# Patient Record
Sex: MALE | Race: BLACK OR AFRICAN AMERICAN | Employment: FULL TIME | URBAN - METROPOLITAN AREA
[De-identification: names, ages, dates, MRNs, and addresses within clinical notes are randomized per-mention and may not be internally consistent; named-entity substitution may affect disease eponyms.]

---

## 2018-01-03 ENCOUNTER — HOSPITAL ENCOUNTER (OUTPATIENT)
Facility: HOSPITAL | Age: 68
Setting detail: OBSERVATION
Discharge: HOME | End: 2018-01-04
Attending: EMERGENCY MEDICINE | Admitting: HOSPITALIST
Payer: COMMERCIAL

## 2018-01-03 ENCOUNTER — APPOINTMENT (OUTPATIENT)
Dept: GENERAL RADIOLOGY | Facility: HOSPITAL | Age: 68
End: 2018-01-03
Attending: EMERGENCY MEDICINE
Payer: COMMERCIAL

## 2018-01-03 ENCOUNTER — APPOINTMENT (OUTPATIENT)
Dept: CT IMAGING | Facility: HOSPITAL | Age: 68
End: 2018-01-03
Attending: EMERGENCY MEDICINE
Payer: COMMERCIAL

## 2018-01-03 DIAGNOSIS — R10.9 ABDOMINAL PAIN OF UNKNOWN ETIOLOGY: ICD-10-CM

## 2018-01-03 DIAGNOSIS — D72.829 LEUKOCYTOSIS, UNSPECIFIED TYPE: ICD-10-CM

## 2018-01-03 DIAGNOSIS — R11.2 INTRACTABLE VOMITING WITH NAUSEA, UNSPECIFIED VOMITING TYPE: Primary | ICD-10-CM

## 2018-01-03 LAB
ALBUMIN SERPL-MCNC: 3.9 G/DL (ref 3.5–4.8)
ALP LIVER SERPL-CCNC: 80 U/L (ref 45–117)
ALT SERPL-CCNC: 28 U/L (ref 17–63)
AST SERPL-CCNC: 24 U/L (ref 15–41)
ATRIAL RATE: 73 BPM
BASOPHILS # BLD AUTO: 0.04 X10(3) UL (ref 0–0.1)
BASOPHILS NFR BLD AUTO: 0.2 %
BILIRUB SERPL-MCNC: 0.3 MG/DL (ref 0.1–2)
BILIRUB UR QL STRIP.AUTO: NEGATIVE
BUN BLD-MCNC: 20 MG/DL (ref 8–20)
CALCIUM BLD-MCNC: 8.9 MG/DL (ref 8.3–10.3)
CHLORIDE: 108 MMOL/L (ref 101–111)
CLARITY UR REFRACT.AUTO: CLEAR
CO2: 27 MMOL/L (ref 22–32)
COLOR UR AUTO: YELLOW
CREAT BLD-MCNC: 1.03 MG/DL (ref 0.7–1.3)
EOSINOPHIL # BLD AUTO: 0.02 X10(3) UL (ref 0–0.3)
EOSINOPHIL NFR BLD AUTO: 0.1 %
ERYTHROCYTE [DISTWIDTH] IN BLOOD BY AUTOMATED COUNT: 15.2 % (ref 11.5–16)
GLUCOSE BLD-MCNC: 208 MG/DL (ref 70–99)
GLUCOSE UR STRIP.AUTO-MCNC: 50 MG/DL
HCT VFR BLD AUTO: 39.5 % (ref 37–53)
HGB BLD-MCNC: 12.7 G/DL (ref 13–17)
IMMATURE GRANULOCYTE COUNT: 0.11 X10(3) UL (ref 0–1)
IMMATURE GRANULOCYTE RATIO %: 0.6 %
KETONES UR STRIP.AUTO-MCNC: NEGATIVE MG/DL
LEUKOCYTE ESTERASE UR QL STRIP.AUTO: NEGATIVE
LIPASE: 212 U/L (ref 73–393)
LYMPHOCYTES # BLD AUTO: 2.62 X10(3) UL (ref 0.9–4)
LYMPHOCYTES NFR BLD AUTO: 14.5 %
M PROTEIN MFR SERPL ELPH: 8.3 G/DL (ref 6.1–8.3)
MCH RBC QN AUTO: 27.4 PG (ref 27–33.2)
MCHC RBC AUTO-ENTMCNC: 32.2 G/DL (ref 31–37)
MCV RBC AUTO: 85.1 FL (ref 80–99)
MONOCYTES # BLD AUTO: 1.13 X10(3) UL (ref 0.1–0.6)
MONOCYTES NFR BLD AUTO: 6.2 %
NEUTROPHIL ABS PRELIM: 14.2 X10 (3) UL (ref 1.3–6.7)
NEUTROPHILS # BLD AUTO: 14.2 X10(3) UL (ref 1.3–6.7)
NEUTROPHILS NFR BLD AUTO: 78.4 %
NITRITE UR QL STRIP.AUTO: NEGATIVE
P AXIS: 51 DEGREES
P-R INTERVAL: 184 MS
PH UR STRIP.AUTO: 7 [PH] (ref 4.5–8)
PLATELET # BLD AUTO: 413 10(3)UL (ref 150–450)
POTASSIUM SERPL-SCNC: 4.4 MMOL/L (ref 3.6–5.1)
PROT UR STRIP.AUTO-MCNC: NEGATIVE MG/DL
Q-T INTERVAL: 404 MS
QRS DURATION: 76 MS
QTC CALCULATION (BEZET): 445 MS
R AXIS: -22 DEGREES
RBC # BLD AUTO: 4.64 X10(6)UL (ref 3.8–5.8)
RBC UR QL AUTO: NEGATIVE
RED CELL DISTRIBUTION WIDTH-SD: 46.7 FL (ref 35.1–46.3)
SODIUM SERPL-SCNC: 139 MMOL/L (ref 136–144)
SP GR UR STRIP.AUTO: 1.03 (ref 1–1.03)
T AXIS: 18 DEGREES
TROPONIN: <0.046 NG/ML (ref ?–0.05)
UROBILINOGEN UR STRIP.AUTO-MCNC: <2 MG/DL
VENTRICULAR RATE: 73 BPM
WBC # BLD AUTO: 18.1 X10(3) UL (ref 4–13)

## 2018-01-03 PROCEDURE — 74177 CT ABD & PELVIS W/CONTRAST: CPT | Performed by: EMERGENCY MEDICINE

## 2018-01-03 PROCEDURE — 99220 INITIAL OBSERVATION CARE,LEVL III: CPT | Performed by: INTERNAL MEDICINE

## 2018-01-03 PROCEDURE — 71045 X-RAY EXAM CHEST 1 VIEW: CPT | Performed by: EMERGENCY MEDICINE

## 2018-01-03 PROCEDURE — 74018 RADEX ABDOMEN 1 VIEW: CPT | Performed by: EMERGENCY MEDICINE

## 2018-01-03 RX ORDER — METRONIDAZOLE 500 MG/1
500 TABLET ORAL EVERY 8 HOURS SCHEDULED
Status: DISCONTINUED | OUTPATIENT
Start: 2018-01-03 | End: 2018-01-04

## 2018-01-03 RX ORDER — HYDROMORPHONE HYDROCHLORIDE 1 MG/ML
0.4 INJECTION, SOLUTION INTRAMUSCULAR; INTRAVENOUS; SUBCUTANEOUS EVERY 2 HOUR PRN
Status: DISCONTINUED | OUTPATIENT
Start: 2018-01-03 | End: 2018-01-04

## 2018-01-03 RX ORDER — HYDROMORPHONE HYDROCHLORIDE 1 MG/ML
0.2 INJECTION, SOLUTION INTRAMUSCULAR; INTRAVENOUS; SUBCUTANEOUS EVERY 2 HOUR PRN
Status: DISCONTINUED | OUTPATIENT
Start: 2018-01-03 | End: 2018-01-04

## 2018-01-03 RX ORDER — ONDANSETRON 2 MG/ML
4 INJECTION INTRAMUSCULAR; INTRAVENOUS EVERY 6 HOURS PRN
Status: DISCONTINUED | OUTPATIENT
Start: 2018-01-03 | End: 2018-01-04

## 2018-01-03 RX ORDER — HYDROMORPHONE HYDROCHLORIDE 1 MG/ML
0.8 INJECTION, SOLUTION INTRAMUSCULAR; INTRAVENOUS; SUBCUTANEOUS EVERY 2 HOUR PRN
Status: DISCONTINUED | OUTPATIENT
Start: 2018-01-03 | End: 2018-01-04

## 2018-01-03 RX ORDER — SODIUM CHLORIDE 9 MG/ML
INJECTION, SOLUTION INTRAVENOUS CONTINUOUS
Status: ACTIVE | OUTPATIENT
Start: 2018-01-03 | End: 2018-01-03

## 2018-01-03 RX ORDER — ENOXAPARIN SODIUM 100 MG/ML
40 INJECTION SUBCUTANEOUS DAILY
Status: DISCONTINUED | OUTPATIENT
Start: 2018-01-03 | End: 2018-01-04

## 2018-01-03 RX ORDER — ONDANSETRON 2 MG/ML
4 INJECTION INTRAMUSCULAR; INTRAVENOUS EVERY 4 HOURS PRN
Status: DISCONTINUED | OUTPATIENT
Start: 2018-01-03 | End: 2018-01-04

## 2018-01-03 RX ORDER — METOCLOPRAMIDE HYDROCHLORIDE 5 MG/ML
10 INJECTION INTRAMUSCULAR; INTRAVENOUS ONCE
Status: COMPLETED | OUTPATIENT
Start: 2018-01-03 | End: 2018-01-03

## 2018-01-03 RX ORDER — ONDANSETRON 2 MG/ML
4 INJECTION INTRAMUSCULAR; INTRAVENOUS ONCE
Status: COMPLETED | OUTPATIENT
Start: 2018-01-03 | End: 2018-01-03

## 2018-01-03 RX ORDER — SODIUM CHLORIDE 9 MG/ML
INJECTION, SOLUTION INTRAVENOUS CONTINUOUS
Status: DISCONTINUED | OUTPATIENT
Start: 2018-01-03 | End: 2018-01-04

## 2018-01-03 RX ORDER — HYDROMORPHONE HYDROCHLORIDE 1 MG/ML
0.5 INJECTION, SOLUTION INTRAMUSCULAR; INTRAVENOUS; SUBCUTANEOUS EVERY 30 MIN PRN
Status: ACTIVE | OUTPATIENT
Start: 2018-01-03 | End: 2018-01-03

## 2018-01-03 RX ORDER — DIPHENHYDRAMINE HYDROCHLORIDE 50 MG/ML
25 INJECTION INTRAMUSCULAR; INTRAVENOUS ONCE
Status: COMPLETED | OUTPATIENT
Start: 2018-01-03 | End: 2018-01-03

## 2018-01-03 RX ORDER — HYDROMORPHONE HYDROCHLORIDE 1 MG/ML
1 INJECTION, SOLUTION INTRAMUSCULAR; INTRAVENOUS; SUBCUTANEOUS EVERY 30 MIN PRN
Status: COMPLETED | OUTPATIENT
Start: 2018-01-03 | End: 2018-01-03

## 2018-01-03 NOTE — ED INITIAL ASSESSMENT (HPI)
Arrives via [facility] EMS with c/o vomiting and abdominal pain since 1900 yesterday, as well as weakness. Diaphoretic and cool to the touch.   Began vomiting upon arrival.

## 2018-01-03 NOTE — H&P
[facility] HOSPITALIST  History and Physical     Brian Whitehead Patient Status:  Observation    10/6/1950 MRN [id number]   [facility] 4NW-A Attending Jorgito Yoo MD   Hosp Day # 0 PCP None Pcp     Chief Complaint: N/V    History of Present Anicteric. Neck: No lymphadenopathy. No JVD. No carotid bruits. Respiratory: Clear to auscultation bilaterally. No wheezes. No rhonchi. Cardiovascular: S1, S2. Regular rate and rhythm. No murmurs, rubs or gallops. Equal pulses.    Chest and Back: No tend

## 2018-01-03 NOTE — ED PROVIDER NOTES
Patient Seen in: [facility] Emergency Department    History   Patient presents with:  Nausea/Vomiting/Diarrhea (gastrointestinal)    Stated Complaint: vomiting    HPI    This is a 60-year-old male who arrives by EMS with complaints of vomiting, ques regular without murmurs or rubs. ABD: The abdomen is soft nondistended nontender. There is no rebound. There is no guarding. EXT: There I cannot reproduce any specific tenderness on his abdomen is good pulses bilaterally.   There is no calf tenderne intervals I agree with the EKG report . The rate is 73. There is nonspecific ST changes. .  No acute ST elevations are noted. The patient's urinalysis, comprehensive, troponin, CBC was done. CBC was noted to be elevated 18.1 and lipase is 212.     Anastasiya Lean INDICATIONS:  vomiting  TECHNIQUE:  CT scanning was performed from the dome of the diaphragm to the pubic symphysis with non-ionic intravenous contrast material. Post contrast coronal MPR imaging was performed. Dose reduction techniques were used.  Dose in atelectasis in both lower lobes. Small hiatal hernia      CONCLUSION:  1. Mild peripancreatic inflammatory changes adjacent to the pancreatic tail, consistent with pancreatitis. Correlate with lipase levels.  2. Mild increased density of the central mesen

## 2018-01-04 VITALS
HEART RATE: 95 BPM | RESPIRATION RATE: 16 BRPM | WEIGHT: 185 LBS | TEMPERATURE: 99 F | HEIGHT: 68 IN | OXYGEN SATURATION: 94 % | DIASTOLIC BLOOD PRESSURE: 88 MMHG | SYSTOLIC BLOOD PRESSURE: 151 MMHG | BODY MASS INDEX: 28.04 KG/M2

## 2018-01-04 LAB
ALBUMIN SERPL-MCNC: 3.1 G/DL (ref 3.5–4.8)
ALP LIVER SERPL-CCNC: 62 U/L (ref 45–117)
ALT SERPL-CCNC: 20 U/L (ref 17–63)
AST SERPL-CCNC: 17 U/L (ref 15–41)
BILIRUB SERPL-MCNC: 0.6 MG/DL (ref 0.1–2)
BUN BLD-MCNC: 12 MG/DL (ref 8–20)
CALCIUM BLD-MCNC: 8.3 MG/DL (ref 8.3–10.3)
CHLORIDE: 108 MMOL/L (ref 101–111)
CO2: 27 MMOL/L (ref 22–32)
CREAT BLD-MCNC: 0.85 MG/DL (ref 0.7–1.3)
ERYTHROCYTE [DISTWIDTH] IN BLOOD BY AUTOMATED COUNT: 15.2 % (ref 11.5–16)
GLUCOSE BLD-MCNC: 139 MG/DL (ref 70–99)
HCT VFR BLD AUTO: 34.3 % (ref 37–53)
HGB BLD-MCNC: 10.9 G/DL (ref 13–17)
M PROTEIN MFR SERPL ELPH: 7.3 G/DL (ref 6.1–8.3)
MCH RBC QN AUTO: 26.8 PG (ref 27–33.2)
MCHC RBC AUTO-ENTMCNC: 31.8 G/DL (ref 31–37)
MCV RBC AUTO: 84.5 FL (ref 80–99)
PLATELET # BLD AUTO: 337 10(3)UL (ref 150–450)
POTASSIUM SERPL-SCNC: 3.8 MMOL/L (ref 3.6–5.1)
RBC # BLD AUTO: 4.06 X10(6)UL (ref 3.8–5.8)
RED CELL DISTRIBUTION WIDTH-SD: 46.9 FL (ref 35.1–46.3)
SODIUM SERPL-SCNC: 143 MMOL/L (ref 136–144)
WBC # BLD AUTO: 13.7 X10(3) UL (ref 4–13)

## 2018-01-04 PROCEDURE — 99217 OBSERVATION CARE DISCHARGE: CPT | Performed by: INTERNAL MEDICINE

## 2018-01-04 RX ORDER — POTASSIUM CHLORIDE 20 MEQ/1
40 TABLET, EXTENDED RELEASE ORAL ONCE
Status: COMPLETED | OUTPATIENT
Start: 2018-01-04 | End: 2018-01-04

## 2018-01-04 NOTE — PROGRESS NOTES
Patient states that he feels a little better today has been able to tolerate clear liquids. has been up ambulating in room with a steady gait. voiding in good amounts without any c/o.no stools this am.

## 2018-01-04 NOTE — DISCHARGE SUMMARY
[facility] HOSPITALIST  DISCHARGE SUMMARY     Phyllis Gabriel Patient Status:  Observation    10/6/1950 MRN [id number]   [place] [facility] 4NW-A Attending No att. providers found   Hosp Day # 0 PCP None Pcp     Date of Admission: 1/3/2018  Date of Disc on IVF, zofran PRN, CLD and he was able to be advanced to regular diet as his abdominal discomfort, N/V have all resolved. His records from [facility] suggested a recent C.diff colitis which is being treated as outpatient .  No diarrhea present at time of admissi

## 2018-01-04 NOTE — CM/SW NOTE
01/04/18 0900   CM/SW Screening   Referral Source    Information Source Chart review;Nursing rounds   Patient's Mental Status Alert;Oriented   Patient's [address]   Patient lives with Alone   Patient Status Prior to Admission

## 2018-01-04 NOTE — PLAN OF CARE
Pt alert and oriented x4. Pt with c/o abdominal pain and nausea. Pain and nausea medications given x2 overnight with moderate relief. Pt denies SOB. Pt with 1 small BM overnight.   Was unable to send due to consistency of BM, BM was formed and not diarr

## 2018-01-04 NOTE — PROGRESS NOTES
Patient discharged to home explained flagyl and not to miss a dose and take all medication until it was completed. also explained to follow up with doctor in a week. patient verbalized his understanding of teaching.  Patient left per wheelchair to [employer]

## 2018-01-04 NOTE — CM/SW NOTE
SW spoke w/pt who stated he did not have a ride home. Pt stated he has no money with him. Informed him SW could arrange a medcar.  Pt stated he would prefer to take a taxi and would stop at an NAS or pay the taxi when he arrives home

## 2018-01-16 ENCOUNTER — HOSPITAL ENCOUNTER (INPATIENT)
Facility: HOSPITAL | Age: 68
LOS: 4 days | Discharge: HOME | DRG: 392 | End: 2018-01-20
Attending: EMERGENCY MEDICINE | Admitting: HOSPITALIST
Payer: COMMERCIAL

## 2018-01-16 ENCOUNTER — APPOINTMENT (OUTPATIENT)
Dept: CT IMAGING | Facility: HOSPITAL | Age: 68
DRG: 392 | End: 2018-01-16
Attending: EMERGENCY MEDICINE
Payer: COMMERCIAL

## 2018-01-16 DIAGNOSIS — K57.92 ACUTE DIVERTICULITIS: Primary | ICD-10-CM

## 2018-01-16 LAB
ALBUMIN SERPL-MCNC: 3.8 G/DL (ref 3.5–4.8)
ALP LIVER SERPL-CCNC: 81 U/L (ref 45–117)
ALT SERPL-CCNC: 27 U/L (ref 17–63)
AST SERPL-CCNC: 19 U/L (ref 15–41)
BASOPHILS # BLD AUTO: 0.02 X10(3) UL (ref 0–0.1)
BASOPHILS NFR BLD AUTO: 0.1 %
BILIRUB SERPL-MCNC: 0.4 MG/DL (ref 0.1–2)
BUN BLD-MCNC: 15 MG/DL (ref 8–20)
CALCIUM BLD-MCNC: 9.3 MG/DL (ref 8.3–10.3)
CHLORIDE: 107 MMOL/L (ref 101–111)
CO2: 27 MMOL/L (ref 22–32)
CREAT BLD-MCNC: 1.09 MG/DL (ref 0.7–1.3)
EOSINOPHIL # BLD AUTO: 0.03 X10(3) UL (ref 0–0.3)
EOSINOPHIL NFR BLD AUTO: 0.2 %
ERYTHROCYTE [DISTWIDTH] IN BLOOD BY AUTOMATED COUNT: 14.8 % (ref 11.5–16)
GLUCOSE BLD-MCNC: 139 MG/DL (ref 70–99)
HCT VFR BLD AUTO: 41.4 % (ref 37–53)
HGB BLD-MCNC: 13.3 G/DL (ref 13–17)
IMMATURE GRANULOCYTE COUNT: 0.06 X10(3) UL (ref 0–1)
IMMATURE GRANULOCYTE RATIO %: 0.4 %
LIPASE: 97 U/L (ref 73–393)
LYMPHOCYTES # BLD AUTO: 1.64 X10(3) UL (ref 0.9–4)
LYMPHOCYTES NFR BLD AUTO: 10.8 %
M PROTEIN MFR SERPL ELPH: 8.3 G/DL (ref 6.1–8.3)
MCH RBC QN AUTO: 27.4 PG (ref 27–33.2)
MCHC RBC AUTO-ENTMCNC: 32.1 G/DL (ref 31–37)
MCV RBC AUTO: 85.4 FL (ref 80–99)
MONOCYTES # BLD AUTO: 0.93 X10(3) UL (ref 0.1–0.6)
MONOCYTES NFR BLD AUTO: 6.1 %
NEUTROPHIL ABS PRELIM: 12.51 X10 (3) UL (ref 1.3–6.7)
NEUTROPHILS # BLD AUTO: 12.51 X10(3) UL (ref 1.3–6.7)
NEUTROPHILS NFR BLD AUTO: 82.4 %
PLATELET # BLD AUTO: 342 10(3)UL (ref 150–450)
POTASSIUM SERPL-SCNC: 4.3 MMOL/L (ref 3.6–5.1)
RBC # BLD AUTO: 4.85 X10(6)UL (ref 3.8–5.8)
RED CELL DISTRIBUTION WIDTH-SD: 46.1 FL (ref 35.1–46.3)
SODIUM SERPL-SCNC: 139 MMOL/L (ref 136–144)
WBC # BLD AUTO: 15.2 X10(3) UL (ref 4–13)

## 2018-01-16 PROCEDURE — 99222 1ST HOSP IP/OBS MODERATE 55: CPT | Performed by: INTERNAL MEDICINE

## 2018-01-16 PROCEDURE — 74177 CT ABD & PELVIS W/CONTRAST: CPT | Performed by: EMERGENCY MEDICINE

## 2018-01-16 RX ORDER — KETOROLAC TROMETHAMINE 30 MG/ML
15 INJECTION, SOLUTION INTRAMUSCULAR; INTRAVENOUS ONCE
Status: COMPLETED | OUTPATIENT
Start: 2018-01-16 | End: 2018-01-16

## 2018-01-16 RX ORDER — ONDANSETRON 2 MG/ML
4 INJECTION INTRAMUSCULAR; INTRAVENOUS EVERY 6 HOURS PRN
Status: DISCONTINUED | OUTPATIENT
Start: 2018-01-16 | End: 2018-01-20

## 2018-01-16 RX ORDER — HYDROCODONE BITARTRATE AND ACETAMINOPHEN 5; 325 MG/1; MG/1
1 TABLET ORAL EVERY 4 HOURS PRN
Status: DISCONTINUED | OUTPATIENT
Start: 2018-01-16 | End: 2018-01-20

## 2018-01-16 RX ORDER — HYDROMORPHONE HYDROCHLORIDE 1 MG/ML
0.5 INJECTION, SOLUTION INTRAMUSCULAR; INTRAVENOUS; SUBCUTANEOUS EVERY 30 MIN PRN
Status: ACTIVE | OUTPATIENT
Start: 2018-01-16 | End: 2018-01-16

## 2018-01-16 RX ORDER — ACETAMINOPHEN 325 MG/1
650 TABLET ORAL EVERY 4 HOURS PRN
Status: DISCONTINUED | OUTPATIENT
Start: 2018-01-16 | End: 2018-01-20

## 2018-01-16 RX ORDER — ONDANSETRON 2 MG/ML
4 INJECTION INTRAMUSCULAR; INTRAVENOUS ONCE
Status: COMPLETED | OUTPATIENT
Start: 2018-01-16 | End: 2018-01-16

## 2018-01-16 RX ORDER — ENOXAPARIN SODIUM 100 MG/ML
40 INJECTION SUBCUTANEOUS DAILY
Status: DISCONTINUED | OUTPATIENT
Start: 2018-01-16 | End: 2018-01-20

## 2018-01-16 RX ORDER — MORPHINE SULFATE 2 MG/ML
4 INJECTION, SOLUTION INTRAMUSCULAR; INTRAVENOUS EVERY 2 HOUR PRN
Status: DISCONTINUED | OUTPATIENT
Start: 2018-01-16 | End: 2018-01-20

## 2018-01-16 RX ORDER — SODIUM CHLORIDE 9 MG/ML
INJECTION, SOLUTION INTRAVENOUS CONTINUOUS
Status: DISCONTINUED | OUTPATIENT
Start: 2018-01-16 | End: 2018-01-20

## 2018-01-16 RX ORDER — METOCLOPRAMIDE HYDROCHLORIDE 5 MG/ML
10 INJECTION INTRAMUSCULAR; INTRAVENOUS EVERY 8 HOURS PRN
Status: DISCONTINUED | OUTPATIENT
Start: 2018-01-16 | End: 2018-01-20

## 2018-01-16 RX ORDER — SODIUM CHLORIDE 9 MG/ML
INJECTION, SOLUTION INTRAVENOUS CONTINUOUS
Status: ACTIVE | OUTPATIENT
Start: 2018-01-16 | End: 2018-01-16

## 2018-01-16 RX ORDER — HYDROCODONE BITARTRATE AND ACETAMINOPHEN 5; 325 MG/1; MG/1
2 TABLET ORAL EVERY 4 HOURS PRN
Status: DISCONTINUED | OUTPATIENT
Start: 2018-01-16 | End: 2018-01-20

## 2018-01-16 RX ORDER — MORPHINE SULFATE 2 MG/ML
2 INJECTION, SOLUTION INTRAMUSCULAR; INTRAVENOUS EVERY 2 HOUR PRN
Status: DISCONTINUED | OUTPATIENT
Start: 2018-01-16 | End: 2018-01-20

## 2018-01-16 RX ORDER — METRONIDAZOLE 500 MG/100ML
500 INJECTION, SOLUTION INTRAVENOUS EVERY 8 HOURS
Status: DISCONTINUED | OUTPATIENT
Start: 2018-01-16 | End: 2018-01-20

## 2018-01-16 RX ORDER — CIPROFLOXACIN 2 MG/ML
400 INJECTION, SOLUTION INTRAVENOUS EVERY 12 HOURS
Status: DISCONTINUED | OUTPATIENT
Start: 2018-01-16 | End: 2018-01-20

## 2018-01-16 RX ORDER — HALOPERIDOL 5 MG/ML
5 INJECTION INTRAMUSCULAR ONCE
Status: COMPLETED | OUTPATIENT
Start: 2018-01-16 | End: 2018-01-16

## 2018-01-16 RX ORDER — DICYCLOMINE HYDROCHLORIDE 10 MG/ML
20 INJECTION INTRAMUSCULAR ONCE
Status: COMPLETED | OUTPATIENT
Start: 2018-01-16 | End: 2018-01-16

## 2018-01-16 RX ORDER — MORPHINE SULFATE 2 MG/ML
4 INJECTION, SOLUTION INTRAMUSCULAR; INTRAVENOUS ONCE
Status: COMPLETED | OUTPATIENT
Start: 2018-01-16 | End: 2018-01-16

## 2018-01-16 RX ORDER — ACETAMINOPHEN 500 MG
1000 TABLET ORAL EVERY 6 HOURS PRN
Status: DISCONTINUED | OUTPATIENT
Start: 2018-01-16 | End: 2018-01-20

## 2018-01-16 RX ORDER — HYDRALAZINE HYDROCHLORIDE 20 MG/ML
10 INJECTION INTRAMUSCULAR; INTRAVENOUS EVERY 4 HOURS PRN
Status: DISCONTINUED | OUTPATIENT
Start: 2018-01-16 | End: 2018-01-20

## 2018-01-16 RX ORDER — MORPHINE SULFATE 2 MG/ML
1 INJECTION, SOLUTION INTRAMUSCULAR; INTRAVENOUS EVERY 2 HOUR PRN
Status: DISCONTINUED | OUTPATIENT
Start: 2018-01-16 | End: 2018-01-20

## 2018-01-16 NOTE — CONSULTS
[facility]                       Gastroenterology Consultation-[facility]    Rhianna Ryan Patient Status:  Inpatient    10/6/1950 MRN [id number]   [facility] 0SW-A Attending Muna Li MD   [address] Day # 0 PCP None Pcp Intravenous Once   [COMPLETED] sodium chloride 0.9% IV bolus 1,000 mL 1,000 mL Intravenous Once   [COMPLETED] sodium chloride 0.9% IV bolus 1,000 mL 1,000 mL Intravenous Once   [COMPLETED] ketorolac tromethamine (TORADOL) 30 MG/ML injection 15 mg 15 mg Int patient has no history of IV drug use or other illicit substances. FamHx: The patient has no family history of colon cancer or other gastrointestinal malignancies;   No family history of ulcer disease, or inflammatory bowel disease  ROS:  In addition to th rebound or guarding;  No ascites is clinically apparent; no tympany to percussion  Ext: No peripheral edema or cyanosis  Skin: Warm and dry  Psychiatric: Appropriate mood and congruent affect without obvious depression or anxiety  Labs:   Lab Results  [place] Peripancreatic stranding has resolved. SPLEEN:  Stable. Not enlarged. KIDNEYS:  Stable. Normal anatomic positions. No hydronephrosis. Numerous circumscribed low attenuation foci are seen bilaterally. They are consistent with cortical cysts.   Many ar Post contrast coronal MPR imaging was performed. Dose reduction techniques were used. Dose information is   transmitted to the [facility]) NRDR ([address]) which includes the Dose Index Registry.      PATIENT adjacent to the pancreatic tail, consistent with pancreatitis. Correlate with lipase levels. 2. 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 3. Multiple uncomplicated sigmoid diverticula.   4. Moderate prost grade fever. CT is more consistent with sigmoid diverticulitis than infectious colitis. On exam, abdomen is soft, no focal tenderness, no rebound or guarding.   Will treat with IV abx for diverticulitis and cover for C diff with oral vancomycin (prophylac

## 2018-01-16 NOTE — ED PROVIDER NOTES
Patient Seen in: [facility] Emergency Department    History   Patient presents with:  Nausea/Vomiting/Diarrhea (gastrointestinal)    Stated Complaint: n/v    HPI    11year-old with a history of renal cancer status post partial right nephrectomy 15 y kg/m²         Physical Exam    General: Patient is awake, alert in no acute distress. HEENT:Sclera are not icteric. Conjunctivae within normal limits. Mucous members are moist.   Cardiovascular: Regular rate and rhythm, normal S1-S2.   Respiratory: Lung recent C. difficile colitis. He still having pain and nausea. At this point he will be admitted for gastroneurology consultation.     Spoke with Dr. Diaz Mcqueen from gastroenterology, he will order oral and IV antibiotics    Spoke with Dr. Setphane Crane who will admit

## 2018-01-16 NOTE — ED INITIAL ASSESSMENT (HPI)
Patient has had N/V since 0300 denies fever, diarrhea, abdominal pain. Pt states he was on a Z pack last week and had a similar episode was told it was from the Z pack at that time.

## 2018-01-16 NOTE — PLAN OF CARE
NURSING ADMISSION NOTE      Patient admitted via stretcher. Oriented to room. Safety precautions initiated. Bed in low position. Call light in reach.

## 2018-01-16 NOTE — CM/SW NOTE
Patient flagging out of network. TC to [facility]. New secondary plan started 1/1/18. [employer] informed me electronically that patient has not selected HMO medical group provider. Will give patient list o EMG providers if he is interested in following here.

## 2018-01-16 NOTE — H&P
[facility] HOSPITALIST  History and Physical     Vicky Contreras Patient Status:  Emergency    10/6/1950 MRN [id number]   Location [address] [facility] Attending Estill Blizzard, MD   Hosp Day # 0 PCP None Pcp     Chief Complaint: Nausea vo rate and rhythm. No murmurs, rubs or gallops. Equal pulses. Chest and Back: No tenderness or deformity. Abdomen: Soft, nontender, nondistended. Positive bowel sounds. No rebound, guarding or organomegaly. Neurologic: No focal neurological deficits.  CN

## 2018-01-17 ENCOUNTER — APPOINTMENT (OUTPATIENT)
Dept: GENERAL RADIOLOGY | Facility: HOSPITAL | Age: 68
DRG: 392 | End: 2018-01-17
Attending: STUDENT IN AN ORGANIZED HEALTH CARE EDUCATION/TRAINING PROGRAM
Payer: COMMERCIAL

## 2018-01-17 LAB
BASOPHILS # BLD AUTO: 0.03 X10(3) UL (ref 0–0.1)
BASOPHILS NFR BLD AUTO: 0.2 %
BUN BLD-MCNC: 11 MG/DL (ref 8–20)
CALCIUM BLD-MCNC: 8.5 MG/DL (ref 8.3–10.3)
CHLORIDE: 108 MMOL/L (ref 101–111)
CO2: 26 MMOL/L (ref 22–32)
CREAT BLD-MCNC: 0.94 MG/DL (ref 0.7–1.3)
EOSINOPHIL # BLD AUTO: 0.03 X10(3) UL (ref 0–0.3)
EOSINOPHIL NFR BLD AUTO: 0.2 %
ERYTHROCYTE [DISTWIDTH] IN BLOOD BY AUTOMATED COUNT: 15.1 % (ref 11.5–16)
GLUCOSE BLD-MCNC: 120 MG/DL (ref 70–99)
HAV IGM SER QL: 2 MG/DL (ref 1.7–3)
HCT VFR BLD AUTO: 37.5 % (ref 37–53)
HGB BLD-MCNC: 12.1 G/DL (ref 13–17)
IMMATURE GRANULOCYTE COUNT: 0.05 X10(3) UL (ref 0–1)
IMMATURE GRANULOCYTE RATIO %: 0.4 %
LYMPHOCYTES # BLD AUTO: 1.94 X10(3) UL (ref 0.9–4)
LYMPHOCYTES NFR BLD AUTO: 14.1 %
MCH RBC QN AUTO: 27.4 PG (ref 27–33.2)
MCHC RBC AUTO-ENTMCNC: 32.3 G/DL (ref 31–37)
MCV RBC AUTO: 85 FL (ref 80–99)
MONOCYTES # BLD AUTO: 1.26 X10(3) UL (ref 0.1–0.6)
MONOCYTES NFR BLD AUTO: 9.2 %
NEUTROPHIL ABS PRELIM: 10.43 X10 (3) UL (ref 1.3–6.7)
NEUTROPHILS # BLD AUTO: 10.43 X10(3) UL (ref 1.3–6.7)
NEUTROPHILS NFR BLD AUTO: 75.9 %
PLATELET # BLD AUTO: 329 10(3)UL (ref 150–450)
POTASSIUM SERPL-SCNC: 3.8 MMOL/L (ref 3.6–5.1)
RBC # BLD AUTO: 4.41 X10(6)UL (ref 3.8–5.8)
RED CELL DISTRIBUTION WIDTH-SD: 46.8 FL (ref 35.1–46.3)
SODIUM SERPL-SCNC: 142 MMOL/L (ref 136–144)
WBC # BLD AUTO: 13.7 X10(3) UL (ref 4–13)

## 2018-01-17 PROCEDURE — 74019 RADEX ABDOMEN 2 VIEWS: CPT | Performed by: STUDENT IN AN ORGANIZED HEALTH CARE EDUCATION/TRAINING PROGRAM

## 2018-01-17 PROCEDURE — 99232 SBSQ HOSP IP/OBS MODERATE 35: CPT | Performed by: HOSPITALIST

## 2018-01-17 NOTE — PROGRESS NOTES
[facility] HOSPITALIST  Progress Note     Hue Mendez Patient Status:  Inpatient    10/6/1950 MRN [id number]   [facility] 0SW-A Attending Dereck Alas MD   Hosp Day # 1 PCP None Pcp     Chief Complaint: abd pain    S: Patient states that llq completed on January 12 presented with abdominal pain nonbloody vomiting and acute diverticulitis  2. Flagyl and ciprofloxacin started for treatment of acute sigmoid diverticulitis  3.  Vancomycin 125 mg p.o. twice daily due to recent history of C. difficil

## 2018-01-17 NOTE — PLAN OF CARE
PT A/O, 95% ON RA, NPO, C/O OF ABDOMINAL PAIN, TAKING MORPHINE WITH TEMPORARY RELIEF, ZOFRAN FOR NAUSEA IN EVENING, VOIDING, IVF INFUSING, IV/PO ANTIBIOTICS GIVEN, LABS IN AM.  METABOLIC/FLUID AND ELECTROLYTES - ADULT    • Hemodynamic stability and optimal

## 2018-01-17 NOTE — PAYOR COMM NOTE
--------------  ADMISSION REVIEW     Payor: [facility] (NON CONTRACTED IPA)  Subscriber #:  [id number]  Authorization Number: N/A    Admit date: 1/16/18  Admit time: 1246       Admitting Physician: Karol Kurtz MD  Attending Physician:  Sarah Bose Comment: partial nephrectomy of right kidney due to                cancer          Review of Systems    Positive for stated complaint: n/v  Other systems are as noted in HPI. Constitutional and vital signs reviewed.       All other systems reviewed and RAINBOW DRAW BLUE   RAINBOW DRAW LAVENDER   RAINBOW DRAW LIGHT GREEN   RAINBOW DRAW GOLD[KF.2]   CT abdomen and pelvis[KF.1]:  Mild sigmoid diverticulitis    ED Course as of Jan 16 1238  ------------------------------------------------------------[KF.2] Kanu Arzate Patient Status:  Emergency    10/6/1950 MRN [id number]   Location [address] [facility] Attending Jenni Armstrong MD   Hosp Day # 0 PCP None Pcp     Chief Complaint:[MP.1] Nausea vomiting    History of Present Illness: Quinton Fuentes Abdomen: Soft, nontender, nondistended. Positive bowel sounds. No rebound, guarding or organomegaly. Neurologic: No focal neurological deficits. CNII-XII grossly intact. Musculoskeletal: Moves all extremities. Extremities: No edema or cyanosis.   Integu 1/16/2018 1328 Given 40 mg Subcutaneous (Left Lower Abdomen) Melani Jerome RN      vancomycin (FIRST-VANCOMYCIN 50) 50 MG/ML oral solution 125 mg     Date Action Dose Route User    1/16/2018 2208 Given 125 mg Oral Geo Weinstein RN    1/16/2018 1517 G Date Action Dose Route User    1/16/2018 2215 New Bag (none) Intravenous Matt Jones RN    1/16/2018 1329 New Bag (none) Intravenous Marlen Echeverria RN      sodium chloride 0.9% IV bolus 1,000 mL     Date Action Dose Route User    1/16/2018 0813 New BOWEL/MESENTERY:  Normal bowel caliber. There is moderate to severe colonic diverticulosis. There is new thickening and pericolonic stranding in the mid sigmoid. No free air or focal fluid collection. Normal appendix.   Haziness in the mid mesenteric Date of Service: 1/16/2018 12:51 PM  Mettu, Gaylen Blizzard, MD   Gastroenterology   Consult Orders:   1.  IP Consult to Gastroenterology Once [[id number]] ordered by Fadia Loomis MD at 01/16/18 1147      []Manual[]Template  []Copied  [facility] Diagnosis Date   • Cancer [facility])       renal      PSHx: Past Surgical History:  No date: OTHER      Comment: partial nephrectomy of right kidney due to                cancer  Medications:   [COMPLETED] ondansetron HCl (ZOFRAN) injection 4 mg 4 mg Intravenou ondansetron HCl (ZOFRAN) injection 4 mg 4 mg Intravenous Q6H PRN   Metoclopramide HCl (REGLAN) injection 10 mg 10 mg Intravenous Q8H PRN      Allergies:   Codeine                 Nausea and vomiting  SocHx:  No history of smoking;   The patient denies alcoh CV: Regular rate and rhythm, with normal S1 and S2  Resp: Clear to auscultation bilaterally without wheezes; rubs, rhonchi, or rales  Abdomen: Soft, supraumbilical tenderness with moderate palpation, non-distended with the presence of bowel sounds;  No hepa PATIENT STATED HISTORY:(As transcribed by Technologist)  Patient complaints of nausea, vomiting since 0300 early morning.     CONTRAST USED:  100cc of Omnipaque 350     FINDINGS:    LIVER:  Stable.  Uniform parenchymal attenuation.   BILIARY:  Stable. Marylu Godinez PROCEDURE:  CT ABDOMEN PELVIS IV CONTRAST, NO ORAL (ER)     COMPARISON:  [facility] , XR ABDOMEN (1 VIEW) (CPT=74018), 1/03/2018, 6:56.  [facility] , CT ABD(C/S)/PELVIS(C) (SET), 4/27/2007, 13:21.     INDICATIONS:  vomiting     TECHNIQUE:  CT scanning was performe URINARY BLADDER:  No visible focal wall thickening, lesion, or calculus.    PELVIC NODES:  No adenopathy.    PELVIC ORGANS:  Moderately enlarged prostate, increased in size. BONES:  L4-5 and L5-S1 degenerative disc disease and vacuum disc degeneration.   L

## 2018-01-17 NOTE — PROGRESS NOTES
Gastroenterology Progress Note  Yuliana Sepulveda Patient Status:  Inpatient    10/6/1950 MRN [id number]   [facility] 0SW-A Attending Mya Marrufo MD   Hosp Day # 1 PCP None Pcp     Chief Saadia liquid diet encouraging smaller more frequent meals; OK to advance as tolerated to a goal of low fiber   2. Continue IV Flagyl/Cipro; consider transitioning to oral route in AM if tolerating a diet  3. Continue Vancomycin 125 mg PO BID  4.  Continue Zofran

## 2018-01-17 NOTE — PLAN OF CARE
GASTROINTESTINAL - ADULT    • Minimal or absence of nausea and vomiting Progressing    • Maintains or returns to baseline bowel function Progressing        METABOLIC/FLUID AND ELECTROLYTES - ADULT    • Hemodynamic stability and optimal renal function maint

## 2018-01-17 NOTE — PROGRESS NOTES
Patient had sips of water and some bites of jello  Increased pain and nausea  Increased frequency and dose of pain medications.    Will continue to monitor  Patient NPO

## 2018-01-18 LAB — POTASSIUM SERPL-SCNC: 3.3 MMOL/L (ref 3.6–5.1)

## 2018-01-18 PROCEDURE — 99232 SBSQ HOSP IP/OBS MODERATE 35: CPT | Performed by: HOSPITALIST

## 2018-01-18 NOTE — PLAN OF CARE
PT A/O, 95% ON RA, INCREASED BP, GIVEN PRN HYDRALAZINE, IV/PO ANTIBIOTICS, SMALL EMESIS, GIVEN ZOFRAN/REGLAN WITH RELIEF, C/O OF ABDOMINAL PAIN OF 7--8, TAKING MORPHINE 4MG Q 2 HOURS, WITH PARTIAL RELIEF, PAIN BETTER THIS AM. SCDS ON, VOIDING IN URINAL, IV

## 2018-01-18 NOTE — PROGRESS NOTES
Gastroenterology Progress Note  Hue Mendez Patient Status:  Inpatient    10/6/1950 MRN [id number]   [facility] 0SW-A Attending Dereck Alas MD   Hosp Day # 2 PCP None Pcp     Chief Saadia FINDINGS:    BOWEL GAS PATTERN:  There is no free air. The large bowel is decompressed, there is some air in the rectum.   There are mildly distended loops of small bowel within the mid abdomen diffusely with a few air-fluid levels on the upright patient was not seen by me, only examined by KIMBERLY Breaux. The plan was discussed with KIMBERLY and her note above was reviewed. In summary, he continues to improve slowly. Advance diet, and if tolerates liquids, convert to oral abx tomorrow.     Prave

## 2018-01-18 NOTE — PROGRESS NOTES
[facility] HOSPITALIST  Progress Note     Any Shyam Patient Status:  Inpatient    10/6/1950 MRN [id number]   [facility] 0SW-A Attending Santa Davis MD   Hosp Day # 2 PCP None Pcp     Chief Complaint: abd pain    S: Patient feels better ov completed  tx on January 12 presented with abdominal pain nonbloody vomiting and acute diverticulitis  2. Flagyl and ciprofloxacin started for treatment of acute sigmoid diverticulitis  3.  Vancomycin 125 mg p.o. twice daily due to recent history of C. diff

## 2018-01-19 LAB
BASOPHILS # BLD AUTO: 0.03 X10(3) UL (ref 0–0.1)
BASOPHILS NFR BLD AUTO: 0.2 %
EOSINOPHIL # BLD AUTO: 0.03 X10(3) UL (ref 0–0.3)
EOSINOPHIL NFR BLD AUTO: 0.2 %
ERYTHROCYTE [DISTWIDTH] IN BLOOD BY AUTOMATED COUNT: 15 % (ref 11.5–16)
ERYTHROCYTE [DISTWIDTH] IN BLOOD BY AUTOMATED COUNT: 15 % (ref 11.5–16)
HCT VFR BLD AUTO: 36.4 % (ref 37–53)
HCT VFR BLD AUTO: 36.4 % (ref 37–53)
HGB BLD-MCNC: 11.7 G/DL (ref 13–17)
HGB BLD-MCNC: 11.7 G/DL (ref 13–17)
IMMATURE GRANULOCYTE COUNT: 0.05 X10(3) UL (ref 0–1)
IMMATURE GRANULOCYTE RATIO %: 0.4 %
LYMPHOCYTES # BLD AUTO: 1.89 X10(3) UL (ref 0.9–4)
LYMPHOCYTES NFR BLD AUTO: 14.1 %
MCH RBC QN AUTO: 27.3 PG (ref 27–33.2)
MCH RBC QN AUTO: 27.3 PG (ref 27–33.2)
MCHC RBC AUTO-ENTMCNC: 32.1 G/DL (ref 31–37)
MCHC RBC AUTO-ENTMCNC: 32.1 G/DL (ref 31–37)
MCV RBC AUTO: 85 FL (ref 80–99)
MCV RBC AUTO: 85 FL (ref 80–99)
MONOCYTES # BLD AUTO: 1.3 X10(3) UL (ref 0.1–0.6)
MONOCYTES NFR BLD AUTO: 9.7 %
NEUTROPHIL ABS PRELIM: 10.08 X10 (3) UL (ref 1.3–6.7)
NEUTROPHILS # BLD AUTO: 10.08 X10(3) UL (ref 1.3–6.7)
NEUTROPHILS NFR BLD AUTO: 75.4 %
PLATELET # BLD AUTO: 299 10(3)UL (ref 150–450)
PLATELET # BLD AUTO: 299 10(3)UL (ref 150–450)
POTASSIUM SERPL-SCNC: 3.6 MMOL/L (ref 3.6–5.1)
RBC # BLD AUTO: 4.28 X10(6)UL (ref 3.8–5.8)
RBC # BLD AUTO: 4.28 X10(6)UL (ref 3.8–5.8)
RED CELL DISTRIBUTION WIDTH-SD: 46.3 FL (ref 35.1–46.3)
RED CELL DISTRIBUTION WIDTH-SD: 46.3 FL (ref 35.1–46.3)
WBC # BLD AUTO: 13.4 X10(3) UL (ref 4–13)
WBC # BLD AUTO: 13.4 X10(3) UL (ref 4–13)

## 2018-01-19 PROCEDURE — 99232 SBSQ HOSP IP/OBS MODERATE 35: CPT | Performed by: HOSPITALIST

## 2018-01-19 NOTE — PROGRESS NOTES
[facility]  Report of GI Consultation    Anupama Vargas Patient Status:  Inpatient    10/6/1950 MRN [id number]   [facility] 0SW-A Attending Emilio Amaro MD   [facility] Day # 3 PCP None Pcp     Date of Admission:  2018  PCP: None Pcp mg Intravenous Q4H PRN   influenza virus vaccine (FLUAD) ages 72 years and older inj 0.5ml 0.5 mL Intramuscular Prior to discharge       Allergies    Codeine                 Nausea and vomiting      Physical Exam:   Blood pressure (!) 161/82, pulse 90, tem admission. Recommendations:  1. Encouraged full liquid diet  2. Continue Levo and Flagyl, change to oral tomorrow if tolerating full liquids  3. Continue prophylactic dose of oral vancomycin  4.  Ambulate frequently      Jensen Ramsey MD  1/19/2018

## 2018-01-19 NOTE — PROGRESS NOTES
[facility] HOSPITALIST  Progress Note     Kanu Arzate Patient Status:  Inpatient    10/6/1950 MRN [id number]   [facility] 0SW-A Attending Quynh Glez MD   Hosp Day # 3 PCP None Pcp     Chief Complaint: abd pain and diarrhea    S: Patient de ASSESSMENT / PLAN:     1. history of renal cancer and a recent diagnosis of C. difficile completed  tx on January 12 presented with abdominal pain nonbloody vomiting and acute diverticulitis-clinically better, anticipate d/c in am  2.  Flagyl and cipr

## 2018-01-20 VITALS
TEMPERATURE: 99 F | SYSTOLIC BLOOD PRESSURE: 164 MMHG | WEIGHT: 184 LBS | DIASTOLIC BLOOD PRESSURE: 88 MMHG | OXYGEN SATURATION: 98 % | RESPIRATION RATE: 18 BRPM | HEART RATE: 81 BPM | BODY MASS INDEX: 27.89 KG/M2 | HEIGHT: 68 IN

## 2018-01-20 LAB — POTASSIUM SERPL-SCNC: 3.5 MMOL/L (ref 3.6–5.1)

## 2018-01-20 PROCEDURE — 99239 HOSP IP/OBS DSCHRG MGMT >30: CPT | Performed by: HOSPITALIST

## 2018-01-20 RX ORDER — CIPROFLOXACIN 500 MG/1
500 TABLET, FILM COATED ORAL 2 TIMES DAILY
Qty: 10 TABLET | Refills: 0 | Status: SHIPPED | OUTPATIENT
Start: 2018-01-20 | End: 2018-01-25

## 2018-01-20 RX ORDER — METRONIDAZOLE 500 MG/1
500 TABLET ORAL EVERY 8 HOURS SCHEDULED
Status: DISCONTINUED | OUTPATIENT
Start: 2018-01-20 | End: 2018-01-20

## 2018-01-20 RX ORDER — LEVOFLOXACIN 750 MG/1
750 TABLET ORAL DAILY
Status: DISCONTINUED | OUTPATIENT
Start: 2018-01-21 | End: 2018-01-20

## 2018-01-20 RX ORDER — METRONIDAZOLE 500 MG/1
500 TABLET ORAL 3 TIMES DAILY
Qty: 15 TABLET | Refills: 0 | Status: SHIPPED | OUTPATIENT
Start: 2018-01-20 | End: 2018-01-25

## 2018-01-20 NOTE — PLAN OF CARE
GASTROINTESTINAL - ADULT    • Minimal or absence of nausea and vomiting Progressing        PAIN - ADULT    • Verbalizes/displays adequate comfort level or patient's stated pain goal Progressing          Assumed care of the patient @ 19:30, ambulating in ha

## 2018-01-20 NOTE — PROGRESS NOTES
[facility]  Report of GI Consultation    Fran Flaming Patient Status:  Inpatient    10/6/1950 MRN [id number]   [facility] 0SW-A Attending Taisha Kingsley MD   [facility] Day # 4 PCP None Pcp     Date of Admission:  2018  PCP: None Pcp temperature source Oral, resp. rate 18, height 68\", weight 184 lb, SpO2 98 %.     GENERAL: NAD, oriented x 3, pleasant  HEENT: Normal oral mucosa, good dentition, no ulceration  PULM: Lungs clear to auscultation anteriorly  CV: Regular, no murmurs  ABD: S/

## 2018-01-20 NOTE — PLAN OF CARE
Pt up walking in the [place], tolerated well. Tolerated a ensure this evening otherwise would only have water and lemon lime soda. Pt denies pain at this time. Pt having elevated B/P, increased metoprolol. meds per md order, Continue to monitor.

## 2018-01-20 NOTE — PLAN OF CARE
Pt states he has had nothing but ensure to drink/eat, He is fearful of thepain. Explained to him that the Dr will not send him home if he hasnt at least trialed some food. Placed order for pt to eat. WIll monitor.  Pt is not complaining of any pain at this

## 2018-01-20 NOTE — DISCHARGE SUMMARY
[facility] HOSPITALIST  DISCHARGE SUMMARY     Isai Rosales Patient Status:  Inpatient    10/6/1950 MRN [id number]   [facility] 0SW-A Attending Salvatore Diallo MD   [facility] Day # 4 PCP None Pcp     Date of Admission: 2018  Date of Discharge:  Medications      START taking these medications      Instructions Prescription details   Ciprofloxacin HCl 500 MG Tabs  Commonly known as:  CIPRO      Take 1 tablet (500 mg total) by mouth 2 (two) times daily.    Stop taking on:  1/25/2018  Quantity:  10 ta

## 2018-01-24 ENCOUNTER — HOSPITAL ENCOUNTER (EMERGENCY)
Facility: HOSPITAL | Age: 68
Discharge: HOME | End: 2018-01-24
Attending: EMERGENCY MEDICINE
Payer: COMMERCIAL

## 2018-01-24 VITALS
HEIGHT: 68 IN | TEMPERATURE: 98 F | DIASTOLIC BLOOD PRESSURE: 82 MMHG | HEART RATE: 88 BPM | BODY MASS INDEX: 28.04 KG/M2 | WEIGHT: 185 LBS | OXYGEN SATURATION: 98 % | RESPIRATION RATE: 16 BRPM | SYSTOLIC BLOOD PRESSURE: 178 MMHG

## 2018-01-24 DIAGNOSIS — A04.72 CLOSTRIDIUM DIFFICILE COLITIS: Primary | ICD-10-CM

## 2018-01-24 LAB
ALBUMIN SERPL-MCNC: 3.4 G/DL (ref 3.5–4.8)
ALP LIVER SERPL-CCNC: 74 U/L (ref 45–117)
ALT SERPL-CCNC: 34 U/L (ref 17–63)
AST SERPL-CCNC: 17 U/L (ref 15–41)
BASOPHILS # BLD AUTO: 0.04 X10(3) UL (ref 0–0.1)
BASOPHILS NFR BLD AUTO: 0.3 %
BILIRUB SERPL-MCNC: 0.1 MG/DL (ref 0.1–2)
BUN BLD-MCNC: 14 MG/DL (ref 8–20)
CALCIUM BLD-MCNC: 8.1 MG/DL (ref 8.3–10.3)
CHLORIDE: 111 MMOL/L (ref 101–111)
CO2: 25 MMOL/L (ref 22–32)
CREAT BLD-MCNC: 0.89 MG/DL (ref 0.7–1.3)
EOSINOPHIL # BLD AUTO: 0.09 X10(3) UL (ref 0–0.3)
EOSINOPHIL NFR BLD AUTO: 0.8 %
ERYTHROCYTE [DISTWIDTH] IN BLOOD BY AUTOMATED COUNT: 14.6 % (ref 11.5–16)
GLUCOSE BLD-MCNC: 95 MG/DL (ref 70–99)
HCT VFR BLD AUTO: 36.4 % (ref 37–53)
HGB BLD-MCNC: 11.8 G/DL (ref 13–17)
IMMATURE GRANULOCYTE COUNT: 0.09 X10(3) UL (ref 0–1)
IMMATURE GRANULOCYTE RATIO %: 0.8 %
LYMPHOCYTES # BLD AUTO: 2.64 X10(3) UL (ref 0.9–4)
LYMPHOCYTES NFR BLD AUTO: 22.6 %
M PROTEIN MFR SERPL ELPH: 7.6 G/DL (ref 6.1–8.3)
MCH RBC QN AUTO: 27.6 PG (ref 27–33.2)
MCHC RBC AUTO-ENTMCNC: 32.4 G/DL (ref 31–37)
MCV RBC AUTO: 85.2 FL (ref 80–99)
MONOCYTES # BLD AUTO: 1.04 X10(3) UL (ref 0.1–0.6)
MONOCYTES NFR BLD AUTO: 8.9 %
NEUTROPHIL ABS PRELIM: 7.77 X10 (3) UL (ref 1.3–6.7)
NEUTROPHILS # BLD AUTO: 7.77 X10(3) UL (ref 1.3–6.7)
NEUTROPHILS NFR BLD AUTO: 66.6 %
PLATELET # BLD AUTO: 355 10(3)UL (ref 150–450)
POTASSIUM SERPL-SCNC: 3.7 MMOL/L (ref 3.6–5.1)
RBC # BLD AUTO: 4.27 X10(6)UL (ref 3.8–5.8)
RED CELL DISTRIBUTION WIDTH-SD: 45.1 FL (ref 35.1–46.3)
SODIUM SERPL-SCNC: 143 MMOL/L (ref 136–144)
WBC # BLD AUTO: 11.7 X10(3) UL (ref 4–13)

## 2018-01-24 PROCEDURE — 99284 EMERGENCY DEPT VISIT MOD MDM: CPT

## 2018-01-24 PROCEDURE — 80053 COMPREHEN METABOLIC PANEL: CPT | Performed by: EMERGENCY MEDICINE

## 2018-01-24 PROCEDURE — 85025 COMPLETE CBC W/AUTO DIFF WBC: CPT | Performed by: EMERGENCY MEDICINE

## 2018-01-24 PROCEDURE — 96360 HYDRATION IV INFUSION INIT: CPT

## 2018-01-25 NOTE — ED PROVIDER NOTES
Patient Seen in: [facility] Emergency Department    History   Patient presents with:  Nausea/Vomiting/Diarrhea (gastrointestinal)    Stated Complaint: diarrhea    HPI    Patient is a pleasant 61-year-old male presents the emergency room with recurre bilaterally  Cardiac: No tachycardia. No murmurs. Regular rate and rhythm. Abdomen: Soft and nontender throughout. No rebound or guarding  Extremities: No clubbing/cyanosis/edema. Skin: No rashes, no pallor  Neuro: Awake oriented ×3.   Nonfocal.  Good [address]  [phone]    In 1 week          Medications Prescribed:  Discharge Medication List as of 1/24/2018  5:03 PM

## 2018-03-01 ENCOUNTER — HOSPITAL ENCOUNTER (INPATIENT)
Facility: HOSPITAL | Age: 68
LOS: 2 days | Discharge: HOME | DRG: 392 | End: 2018-03-04
Attending: EMERGENCY MEDICINE | Admitting: INTERNAL MEDICINE
Payer: COMMERCIAL

## 2018-03-01 ENCOUNTER — APPOINTMENT (OUTPATIENT)
Dept: CT IMAGING | Facility: HOSPITAL | Age: 68
DRG: 392 | End: 2018-03-01
Attending: EMERGENCY MEDICINE
Payer: COMMERCIAL

## 2018-03-01 DIAGNOSIS — R11.2 INTRACTABLE VOMITING WITH NAUSEA, UNSPECIFIED VOMITING TYPE: ICD-10-CM

## 2018-03-01 DIAGNOSIS — R10.9 ABDOMINAL PAIN, ACUTE: Primary | ICD-10-CM

## 2018-03-01 DIAGNOSIS — D72.829 LEUKOCYTOSIS, UNSPECIFIED TYPE: ICD-10-CM

## 2018-03-01 DIAGNOSIS — R10.9 ABDOMINAL PAIN: ICD-10-CM

## 2018-03-01 PROBLEM — I10 HTN (HYPERTENSION): Status: ACTIVE | Noted: 2018-03-01

## 2018-03-01 PROBLEM — R73.9 HYPERGLYCEMIA: Status: ACTIVE | Noted: 2018-03-01

## 2018-03-01 LAB
ALBUMIN SERPL-MCNC: 4.1 G/DL (ref 3.5–4.8)
ALP LIVER SERPL-CCNC: 88 U/L (ref 45–117)
ALT SERPL-CCNC: 24 U/L (ref 17–63)
APTT PPP: 23.1 SECONDS (ref 25–34)
AST SERPL-CCNC: 19 U/L (ref 15–41)
BASOPHILS # BLD AUTO: 0.05 X10(3) UL (ref 0–0.1)
BASOPHILS NFR BLD AUTO: 0.3 %
BILIRUB SERPL-MCNC: 0.4 MG/DL (ref 0.1–2)
BILIRUB UR QL STRIP.AUTO: NEGATIVE
BUN BLD-MCNC: 13 MG/DL (ref 8–20)
CALCIUM BLD-MCNC: 9.7 MG/DL (ref 8.3–10.3)
CHLORIDE: 105 MMOL/L (ref 101–111)
CLARITY UR REFRACT.AUTO: CLEAR
CO2: 26 MMOL/L (ref 22–32)
CREAT BLD-MCNC: 1.12 MG/DL (ref 0.7–1.3)
EOSINOPHIL # BLD AUTO: 0.02 X10(3) UL (ref 0–0.3)
EOSINOPHIL NFR BLD AUTO: 0.1 %
ERYTHROCYTE [DISTWIDTH] IN BLOOD BY AUTOMATED COUNT: 14.8 % (ref 11.5–16)
GLUCOSE BLD-MCNC: 142 MG/DL (ref 70–99)
GLUCOSE UR STRIP.AUTO-MCNC: 150 MG/DL
HCT VFR BLD AUTO: 42.9 % (ref 37–53)
HGB BLD-MCNC: 13.7 G/DL (ref 13–17)
IMMATURE GRANULOCYTE COUNT: 0.06 X10(3) UL (ref 0–1)
IMMATURE GRANULOCYTE RATIO %: 0.4 %
INR BLD: 1.04 (ref 0.89–1.11)
KETONES UR STRIP.AUTO-MCNC: 20 MG/DL
LEUKOCYTE ESTERASE UR QL STRIP.AUTO: NEGATIVE
LIPASE: 93 U/L (ref 73–393)
LYMPHOCYTES # BLD AUTO: 2.54 X10(3) UL (ref 0.9–4)
LYMPHOCYTES NFR BLD AUTO: 15.9 %
M PROTEIN MFR SERPL ELPH: 8.8 G/DL (ref 6.1–8.3)
MCH RBC QN AUTO: 27.2 PG (ref 27–33.2)
MCHC RBC AUTO-ENTMCNC: 31.9 G/DL (ref 31–37)
MCV RBC AUTO: 85.3 FL (ref 80–99)
MONOCYTES # BLD AUTO: 1.03 X10(3) UL (ref 0.1–1)
MONOCYTES NFR BLD AUTO: 6.4 %
NEUTROPHIL ABS PRELIM: 12.3 X10 (3) UL (ref 1.3–6.7)
NEUTROPHILS # BLD AUTO: 12.3 X10(3) UL (ref 1.3–6.7)
NEUTROPHILS NFR BLD AUTO: 76.9 %
NITRITE UR QL STRIP.AUTO: NEGATIVE
PH UR STRIP.AUTO: 8 [PH] (ref 4.5–8)
PLATELET # BLD AUTO: 270 10(3)UL (ref 150–450)
POTASSIUM SERPL-SCNC: 3.7 MMOL/L (ref 3.6–5.1)
PROT UR STRIP.AUTO-MCNC: NEGATIVE MG/DL
PSA SERPL DL<=0.01 NG/ML-MCNC: 13.6 SECONDS (ref 12–14.3)
RBC # BLD AUTO: 5.03 X10(6)UL (ref 3.8–5.8)
RBC UR QL AUTO: NEGATIVE
RED CELL DISTRIBUTION WIDTH-SD: 46.1 FL (ref 35.1–46.3)
SODIUM SERPL-SCNC: 140 MMOL/L (ref 136–144)
SP GR UR STRIP.AUTO: 1.04 (ref 1–1.03)
UROBILINOGEN UR STRIP.AUTO-MCNC: <2 MG/DL
WBC # BLD AUTO: 16 X10(3) UL (ref 4–13)

## 2018-03-01 PROCEDURE — 74177 CT ABD & PELVIS W/CONTRAST: CPT | Performed by: EMERGENCY MEDICINE

## 2018-03-01 PROCEDURE — 99223 1ST HOSP IP/OBS HIGH 75: CPT | Performed by: INTERNAL MEDICINE

## 2018-03-01 RX ORDER — MORPHINE SULFATE 4 MG/ML
1 INJECTION, SOLUTION INTRAMUSCULAR; INTRAVENOUS EVERY 2 HOUR PRN
Status: DISCONTINUED | OUTPATIENT
Start: 2018-03-01 | End: 2018-03-04

## 2018-03-01 RX ORDER — SODIUM CHLORIDE 9 MG/ML
INJECTION, SOLUTION INTRAVENOUS CONTINUOUS
Status: ACTIVE | OUTPATIENT
Start: 2018-03-02 | End: 2018-03-02

## 2018-03-01 RX ORDER — SODIUM CHLORIDE 9 MG/ML
1000 INJECTION, SOLUTION INTRAVENOUS ONCE
Status: COMPLETED | OUTPATIENT
Start: 2018-03-01 | End: 2018-03-02

## 2018-03-01 RX ORDER — ENOXAPARIN SODIUM 100 MG/ML
40 INJECTION SUBCUTANEOUS DAILY
Status: DISCONTINUED | OUTPATIENT
Start: 2018-03-02 | End: 2018-03-04

## 2018-03-01 RX ORDER — ONDANSETRON 2 MG/ML
4 INJECTION INTRAMUSCULAR; INTRAVENOUS ONCE
Status: COMPLETED | OUTPATIENT
Start: 2018-03-01 | End: 2018-03-01

## 2018-03-01 RX ORDER — KETOROLAC TROMETHAMINE 30 MG/ML
30 INJECTION, SOLUTION INTRAMUSCULAR; INTRAVENOUS ONCE
Status: COMPLETED | OUTPATIENT
Start: 2018-03-01 | End: 2018-03-01

## 2018-03-01 RX ORDER — ONDANSETRON 2 MG/ML
4 INJECTION INTRAMUSCULAR; INTRAVENOUS ONCE
Status: DISCONTINUED | OUTPATIENT
Start: 2018-03-01 | End: 2018-03-04

## 2018-03-01 RX ORDER — ONDANSETRON 2 MG/ML
4 INJECTION INTRAMUSCULAR; INTRAVENOUS EVERY 4 HOURS PRN
Status: COMPLETED | OUTPATIENT
Start: 2018-03-01 | End: 2018-03-02

## 2018-03-01 RX ORDER — MORPHINE SULFATE 4 MG/ML
2 INJECTION, SOLUTION INTRAMUSCULAR; INTRAVENOUS EVERY 2 HOUR PRN
Status: DISCONTINUED | OUTPATIENT
Start: 2018-03-01 | End: 2018-03-04

## 2018-03-01 RX ORDER — MORPHINE SULFATE 4 MG/ML
4 INJECTION, SOLUTION INTRAMUSCULAR; INTRAVENOUS EVERY 2 HOUR PRN
Status: DISCONTINUED | OUTPATIENT
Start: 2018-03-01 | End: 2018-03-04

## 2018-03-01 RX ORDER — SODIUM CHLORIDE 9 MG/ML
1000 INJECTION, SOLUTION INTRAVENOUS ONCE
Status: COMPLETED | OUTPATIENT
Start: 2018-03-01 | End: 2018-03-01

## 2018-03-01 RX ORDER — SODIUM CHLORIDE 9 MG/ML
INJECTION, SOLUTION INTRAVENOUS CONTINUOUS
Status: DISCONTINUED | OUTPATIENT
Start: 2018-03-02 | End: 2018-03-04

## 2018-03-01 RX ORDER — MORPHINE SULFATE 2 MG/ML
2 INJECTION, SOLUTION INTRAMUSCULAR; INTRAVENOUS ONCE
Status: COMPLETED | OUTPATIENT
Start: 2018-03-01 | End: 2018-03-01

## 2018-03-01 RX ORDER — ONDANSETRON 2 MG/ML
4 INJECTION INTRAMUSCULAR; INTRAVENOUS EVERY 6 HOURS PRN
Status: DISCONTINUED | OUTPATIENT
Start: 2018-03-01 | End: 2018-03-04

## 2018-03-02 LAB
ERYTHROCYTE [DISTWIDTH] IN BLOOD BY AUTOMATED COUNT: 14.8 % (ref 11.5–16)
HCT VFR BLD AUTO: 40.2 % (ref 37–53)
HGB BLD-MCNC: 12.9 G/DL (ref 13–17)
MCH RBC QN AUTO: 26.8 PG (ref 27–33.2)
MCHC RBC AUTO-ENTMCNC: 32.1 G/DL (ref 31–37)
MCV RBC AUTO: 83.6 FL (ref 80–99)
PLATELET # BLD AUTO: 289 10(3)UL (ref 150–450)
RBC # BLD AUTO: 4.81 X10(6)UL (ref 3.8–5.8)
RED CELL DISTRIBUTION WIDTH-SD: 45.6 FL (ref 35.1–46.3)
WBC # BLD AUTO: 14.1 X10(3) UL (ref 4–13)

## 2018-03-02 PROCEDURE — 99254 IP/OBS CNSLTJ NEW/EST MOD 60: CPT | Performed by: SURGERY

## 2018-03-02 PROCEDURE — 99232 SBSQ HOSP IP/OBS MODERATE 35: CPT | Performed by: INTERNAL MEDICINE

## 2018-03-02 RX ORDER — METOCLOPRAMIDE HYDROCHLORIDE 5 MG/ML
10 INJECTION INTRAMUSCULAR; INTRAVENOUS EVERY 6 HOURS PRN
Status: DISCONTINUED | OUTPATIENT
Start: 2018-03-02 | End: 2018-03-04

## 2018-03-02 RX ORDER — HYDRALAZINE HYDROCHLORIDE 20 MG/ML
10 INJECTION INTRAMUSCULAR; INTRAVENOUS EVERY 4 HOURS PRN
Status: DISCONTINUED | OUTPATIENT
Start: 2018-03-02 | End: 2018-03-04

## 2018-03-02 RX ORDER — ACETAMINOPHEN 120 MG/1
120 SUPPOSITORY RECTAL EVERY 6 HOURS PRN
Status: DISCONTINUED | OUTPATIENT
Start: 2018-03-02 | End: 2018-03-03

## 2018-03-02 RX ORDER — POTASSIUM CHLORIDE 14.9 MG/ML
20 INJECTION INTRAVENOUS ONCE
Status: COMPLETED | OUTPATIENT
Start: 2018-03-02 | End: 2018-03-02

## 2018-03-02 RX ORDER — METOCLOPRAMIDE 10 MG/1
10 TABLET ORAL EVERY 6 HOURS PRN
Status: DISCONTINUED | OUTPATIENT
Start: 2018-03-02 | End: 2018-03-02

## 2018-03-02 RX ORDER — METOCLOPRAMIDE 10 MG/1
10 TABLET ORAL EVERY 6 HOURS PRN
Status: DISCONTINUED | OUTPATIENT
Start: 2018-03-02 | End: 2018-03-04

## 2018-03-02 RX ORDER — TRAMADOL HYDROCHLORIDE 50 MG/1
50 TABLET ORAL EVERY 6 HOURS PRN
Status: DISCONTINUED | OUTPATIENT
Start: 2018-03-02 | End: 2018-03-04

## 2018-03-02 NOTE — ED PROVIDER NOTES
Patient Seen in: [facility] Emergency Department    History   Patient presents with:  Fever (infectious)  Nausea/Vomiting/Diarrhea (gastrointestinal)    Stated Complaint: flu like symptoms    HPI    Patient is 72-year-old male presents emergency rusty breathing easily in no apparent distress. Patient is nontoxic in appearance. HEENT: Head is normocephalic, atraumatic. Pupils are 3 mm equally round and reactive to light. Oropharynx is clear.  Mucous membranes are moist.  NECK: There is no focal tenderne Normal   CBC WITH DIFFERENTIAL WITH PLATELET    Narrative: The following orders were created for panel order CBC WITH DIFFERENTIAL WITH PLATELET.   Procedure                               Abnormality         Status                     --------- 10:34 PM           Disposition and Plan     Clinical Impression:  Abdominal pain, acute  (primary encounter diagnosis)  Intractable vomiting with nausea, unspecified vomiting type  Leukocytosis, unspecified type    Disposition:  Admit  3/1/2018 10:34 pm

## 2018-03-02 NOTE — CM/SW NOTE
03/02/18 1000   CM/SW Screening   Referral Source Social Work (self-referral)   [address] staff; Chart review;Nursing rounds   Patient's Mental Status Alert;Oriented       MSW, CM and bedside RN spoke about post d/c needs.  No identified need

## 2018-03-02 NOTE — CONSULTS
[facility]                       Gastroenterology Consultation-[facility] Gastroenterology    Sho Engle Patient Status:  Observation    10/6/1950 MRN [id number]   [facility] 3NW-A Attending Wendy Rincon MD   Hosp Day # 0 PCP No (APRESOLINE) injection 10 mg 10 mg Intravenous Q4H PRN   potassium chloride IVPB premix 20 mEq 20 mEq Intravenous Once   [COMPLETED] ketorolac tromethamine (TORADOL) 30 MG/ML injection 30 mg 30 mg Intravenous Once   [COMPLETED] ondansetron HCl (ZOFRAN) inj above:            Infectious Disease:  + reports C Diff 12/2017 [facility]             Cardiovascular: + HTN            Respiratory: No shortness of breath, asthma, copd, recurrent pneumonia            Hematologic: The patient reports no easy bruising, Results  Component Value Date   WBC 14.1 03/02/2018   HGB 12.9 03/02/2018   HCT 40.2 03/02/2018   .0 03/02/2018   CREATSERUM 1.12 03/01/2018   BUN 13 03/01/2018    03/01/2018   K 3.7 03/01/2018    03/01/2018   CO2 26.0 03/01/2018   GLU 1 atrophy, abnormal density, or significant focal lesion. BILIARY:  There is a calcified gallstone that appears adherent to the anterior wall of the gallbladder, unchanged from prior imaging. No biliary ductal dilatation.   PANCREAS:  No lesion, fluid col vomiting     TECHNIQUE:  CT scanning was performed from the dome of the diaphragm to the pubic symphysis with non-ionic intravenous contrast material. Post contrast coronal MPR imaging was performed. Dose reduction techniques were used.  Dose information i degeneration. LUNG BASES:  Dependent atelectasis in both lower lobes. Small hiatal hernia  =====  CONCLUSION:    1. Mild peripancreatic inflammatory changes adjacent to the pancreatic tail, consistent with pancreatitis. Correlate with lipase levels.   2. soft yet tender to palpation waqas-umbilically. He appears somewhat uncomfortable. Labs and imaging were reviewed.  He has cholelithiasis and I am concerned for biliary dyskinesia vs. Acute cholecystitis as a cause for his symptoms given his leukocytosis and

## 2018-03-02 NOTE — PROGRESS NOTES
NURSING ADMISSION NOTE      Patient admitted via Cart  Oriented to room. Safety precautions initiated. Bed in low position. Call light in reach. A/ ox 4. Patient resting in bed. Complains of mid abdominal shooting pain that is constant.   Zaire Albert

## 2018-03-02 NOTE — ED INITIAL ASSESSMENT (HPI)
Patient sts vomiting x6 today. Patient c/o flu like symptoms started around 9/10 this am. Denies fevers, + chills. EMS administered 4mg Zofran ODT.

## 2018-03-02 NOTE — H&P
[facility] HOSPITALIST  History and Physical     [facility] Patient Status:  Observation    10/6/1950 MRN [id number]   [facility] 3NW-A Attending Eloisa Funez MD   Hosp Day # 0 PCP None Pcp     Chief Complaint: abd pain/ Nausea    Histo PERRLA. Anicteric. Neck: No lymphadenopathy. No JVD. No carotid bruits. Respiratory: Clear to auscultation bilaterally. No wheezes. No rhonchi. Cardiovascular: S1, S2. Regular rate and rhythm. No murmurs, rubs or gallops. Equal pulses.    Chest and Back: staff    Radhika Hawkins MD

## 2018-03-02 NOTE — CONSULTS
[facility]  Report of Consultation    Fito Parker Patient Status:  Observation    10/6/1950 MRN [id number]   [facility] 3NW-A Attending Brian Silva MD   Hosp Day # 0 PCP None Pcp     Reason for Consultation:  Nausea, vomiting, following fatty food ingestion. I acted as a scribe in this encounter. The physician obtained a history, independently performed a physical exam, and developed an assessment and plan. The physician performed all medical decision making.     Barby Almonte irritability and lethargy, positive for chills and generalized weakness  Endocrine:  Negative for abnormal sleep patterns, increased activity, polydipsia and polyphagia  ENMT:  Negative for ear drainage, hearing loss and nasal drainage  Eyes:  Negative for Normal texture and turgor.       Laboratory Data:  Recent Labs   Lab  03/01/18   1936  03/02/18   0546   RBC  5.03  4.81   HGB  13.7  12.9*   HCT  42.9  40.2   MCV  85.3  83.6   MCH  27.2  26.8*   MCHC  31.9  32.1   RDW  14.8  14.8   NEPRELIM  12.30*   -- SPLEEN:  No enlargement or focal lesion. KIDNEYS:  Stable multiple nonenhancing bilateral renal cysts. ADRENALS:  No mass or enlargement. AORTA/VASCULAR:  No aneurysm or dissection. RETROPERITONEUM:  No mass or adenopathy.     BOWEL/MESENTERY: previous fatty food intolerance. He does not currently have right upper quadrant pain or tenderness on examination. The patient's main complaint continues to be nausea and vomiting. He has no sick contacts.   He does have a history of diverticulitis bu to reflect my evaluation, opinion, and physical exam findings. I, Dr. Farshad Lam, personally performed the services described in this documentation, as scribed by Ms Li Watson PA-C, in my presence, and it is both accurate and complete.

## 2018-03-02 NOTE — PLAN OF CARE
GASTROINTESTINAL - ADULT    • Minimal or absence of nausea and vomiting Progressing    • Maintains or returns to baseline bowel function Progressing        Upon assessment pt is laying in bed. Appears uncomfortable.  Reports epigastric abdominal pain, morph

## 2018-03-02 NOTE — PROGRESS NOTES
[facility]PITALIST  Progress Note     Comfort Hodge Patient Status:  Observation    10/6/1950 MRN [id number]   [facility] 3NW-A Attending Lalo Hansen MD   Hosp Day # 0 PCP None Pcp     Chief Complaint: abd pain, N/V    S: Patient wit 1. Unclear etiology -> early ileus, gastritis, possible recurrent C.diff   2. CT A/P without evidence of pancreatitis/diverticulitis/colitis   3. If any diarrhea will need to check for C.diff  4. GI to eval  5. Lipase normal   6. Pain control   7.  CLD an

## 2018-03-03 ENCOUNTER — APPOINTMENT (OUTPATIENT)
Dept: NUCLEAR MEDICINE | Facility: HOSPITAL | Age: 68
DRG: 392 | End: 2018-03-03
Attending: NURSE PRACTITIONER
Payer: COMMERCIAL

## 2018-03-03 LAB
ALBUMIN SERPL-MCNC: 3.9 G/DL (ref 3.5–4.8)
ALP LIVER SERPL-CCNC: 75 U/L (ref 45–117)
ALT SERPL-CCNC: 21 U/L (ref 17–63)
AST SERPL-CCNC: 17 U/L (ref 15–41)
BASOPHILS # BLD AUTO: 0.04 X10(3) UL (ref 0–0.1)
BASOPHILS NFR BLD AUTO: 0.3 %
BILIRUB SERPL-MCNC: 0.7 MG/DL (ref 0.1–2)
BUN BLD-MCNC: 14 MG/DL (ref 8–20)
CALCIUM BLD-MCNC: 8.8 MG/DL (ref 8.3–10.3)
CHLORIDE: 107 MMOL/L (ref 101–111)
CO2: 21 MMOL/L (ref 22–32)
CREAT BLD-MCNC: 1.05 MG/DL (ref 0.7–1.3)
EOSINOPHIL # BLD AUTO: 0.03 X10(3) UL (ref 0–0.3)
EOSINOPHIL NFR BLD AUTO: 0.2 %
ERYTHROCYTE [DISTWIDTH] IN BLOOD BY AUTOMATED COUNT: 15.4 % (ref 11.5–16)
GLUCOSE BLD-MCNC: 118 MG/DL (ref 70–99)
HCT VFR BLD AUTO: 42.6 % (ref 37–53)
HGB BLD-MCNC: 13.6 G/DL (ref 13–17)
IMMATURE GRANULOCYTE COUNT: 0.06 X10(3) UL (ref 0–1)
IMMATURE GRANULOCYTE RATIO %: 0.4 %
LACTIC ACID: 1.6 MMOL/L (ref 0.5–2)
LIPASE: 77 U/L (ref 73–393)
LYMPHOCYTES # BLD AUTO: 3.26 X10(3) UL (ref 0.9–4)
LYMPHOCYTES NFR BLD AUTO: 23.2 %
M PROTEIN MFR SERPL ELPH: 8.1 G/DL (ref 6.1–8.3)
MCH RBC QN AUTO: 27.1 PG (ref 27–33.2)
MCHC RBC AUTO-ENTMCNC: 31.9 G/DL (ref 31–37)
MCV RBC AUTO: 85 FL (ref 80–99)
MONOCYTES # BLD AUTO: 1.37 X10(3) UL (ref 0.1–1)
MONOCYTES NFR BLD AUTO: 9.7 %
NEUTROPHIL ABS PRELIM: 9.31 X10 (3) UL (ref 1.3–6.7)
NEUTROPHILS # BLD AUTO: 9.31 X10(3) UL (ref 1.3–6.7)
NEUTROPHILS NFR BLD AUTO: 66.2 %
PLATELET # BLD AUTO: 307 10(3)UL (ref 150–450)
POTASSIUM SERPL-SCNC: 3.7 MMOL/L (ref 3.6–5.1)
POTASSIUM SERPL-SCNC: 3.7 MMOL/L (ref 3.6–5.1)
PROCALCITONIN SERPL-MCNC: <0.11 NG/ML (ref ?–0.11)
RBC # BLD AUTO: 5.01 X10(6)UL (ref 3.8–5.8)
RED CELL DISTRIBUTION WIDTH-SD: 46.6 FL (ref 35.1–46.3)
SODIUM SERPL-SCNC: 139 MMOL/L (ref 136–144)
WBC # BLD AUTO: 14.1 X10(3) UL (ref 4–13)

## 2018-03-03 PROCEDURE — 78227 HEPATOBIL SYST IMAGE W/DRUG: CPT | Performed by: NURSE PRACTITIONER

## 2018-03-03 PROCEDURE — 99232 SBSQ HOSP IP/OBS MODERATE 35: CPT | Performed by: INTERNAL MEDICINE

## 2018-03-03 PROCEDURE — 99232 SBSQ HOSP IP/OBS MODERATE 35: CPT | Performed by: SURGERY

## 2018-03-03 RX ORDER — LABETALOL HYDROCHLORIDE 5 MG/ML
10 INJECTION, SOLUTION INTRAVENOUS EVERY 4 HOURS PRN
Status: ACTIVE | OUTPATIENT
Start: 2018-03-03 | End: 2018-03-03

## 2018-03-03 RX ORDER — KETOROLAC TROMETHAMINE 15 MG/ML
15 INJECTION, SOLUTION INTRAMUSCULAR; INTRAVENOUS EVERY 6 HOURS PRN
Status: DISCONTINUED | OUTPATIENT
Start: 2018-03-03 | End: 2018-03-04

## 2018-03-03 RX ORDER — POTASSIUM CHLORIDE 20 MEQ/1
40 TABLET, EXTENDED RELEASE ORAL ONCE
Status: COMPLETED | OUTPATIENT
Start: 2018-03-03 | End: 2018-03-03

## 2018-03-03 RX ORDER — LABETALOL HYDROCHLORIDE 5 MG/ML
INJECTION, SOLUTION INTRAVENOUS
Status: DISPENSED
Start: 2018-03-03 | End: 2018-03-03

## 2018-03-03 RX ORDER — ACETAMINOPHEN 325 MG/1
650 TABLET ORAL EVERY 6 HOURS PRN
Status: DISCONTINUED | OUTPATIENT
Start: 2018-03-03 | End: 2018-03-04

## 2018-03-03 RX ORDER — LABETALOL HYDROCHLORIDE 5 MG/ML
10 INJECTION, SOLUTION INTRAVENOUS ONCE
Status: DISCONTINUED | OUTPATIENT
Start: 2018-03-03 | End: 2018-03-03

## 2018-03-03 NOTE — PROGRESS NOTES
Pt's iv leaking this am.  Iv restarted with difficulty. #24g placed to right wrist.   Pt c/o nausea & having dry heaves. protonix given as ordered. C/o pain. toradol given as ordered. B/p elevated @ 190/88. Hydralazine given as ordered.    Pt c/o chil

## 2018-03-03 NOTE — PROGRESS NOTES
Pt c/o chills & having rigors. Dr Judy Clemente @ bedside. New orders noted for labatolol, tyl po now & stat labs. Lab @ bedside drawing blood.

## 2018-03-03 NOTE — PROGRESS NOTES
Gastroenterology Progress Note  S: Developed chills, rigors this morning, and increased abdominal pain. Localized it to epigastrium, right abdomen. Slightly better after pain meds 6/10. Awaiting HIDA scan.  Stat labs obtained  O: BP (!) 170/75 (BP Location:

## 2018-03-03 NOTE — PROGRESS NOTES
[facility]  Progress Note    Steve Blandon Patient Status:  Inpatient    10/6/1950 MRN [id number]   [facility] 3NW-A Attending Lesly Ford MD   Hosp Day # 1 PCP None Pcp     Subjective:  Pt continued to have rigors overnight, T carcinoma, right (HCC)     Hyperglycemia     Abdominal pain, acute     HTN (hypertension)    74yo M with elevated WBC and rigors despite no documented fevers, concern for cholecystitis due to gallstones seen on CT scan, pts LFTs within normal limits, lipas

## 2018-03-03 NOTE — PLAN OF CARE
GASTROINTESTINAL - ADULT    • Minimal or absence of nausea and vomiting Progressing    • Maintains or returns to baseline bowel function Progressing        A&0x3. Sats >92 on ra. Denies sob and cp. NPO.  C/o nausea with adequate relief from zofran and devi

## 2018-03-03 NOTE — PROGRESS NOTES
Spoke with dr Luisito Lovell. hida scan results given to md & poc discussed. md states ok to give cld but npo after midnight for egd.   New orders also noted for cbc & cmp in am.

## 2018-03-03 NOTE — PROGRESS NOTES
[facility]PITALIST  Progress Note     Hue Mendez Patient Status:  Observation    10/6/1950 MRN [id number]   [facility] 3NW-A Attending Clay Tavera MD   [facility] Day # 1 PCP None Pcp     Chief Complaint: abd pain, N/V    S: Patient wit (PROTONIX) IV push  40 mg Intravenous Q24H   • ondansetron HCl  4 mg Intravenous Once   • metoprolol Tartrate  25 mg Oral 2x Daily(Beta Blocker)   • enoxaparin  40 mg Subcutaneous Daily       ASSESSMENT / PLAN:     1. Intractable abdominal pain   1.  Deana Luna

## 2018-03-04 ENCOUNTER — ANESTHESIA (OUTPATIENT)
Dept: ENDOSCOPY | Facility: HOSPITAL | Age: 68
DRG: 392 | End: 2018-03-04
Payer: COMMERCIAL

## 2018-03-04 ENCOUNTER — ANESTHESIA EVENT (OUTPATIENT)
Dept: ENDOSCOPY | Facility: HOSPITAL | Age: 68
DRG: 392 | End: 2018-03-04
Payer: COMMERCIAL

## 2018-03-04 ENCOUNTER — SURGERY (OUTPATIENT)
Age: 68
End: 2018-03-04

## 2018-03-04 VITALS
DIASTOLIC BLOOD PRESSURE: 81 MMHG | SYSTOLIC BLOOD PRESSURE: 162 MMHG | HEART RATE: 95 BPM | RESPIRATION RATE: 16 BRPM | OXYGEN SATURATION: 97 % | WEIGHT: 187.31 LBS | BODY MASS INDEX: 27.74 KG/M2 | TEMPERATURE: 98 F | HEIGHT: 69 IN

## 2018-03-04 LAB
ALBUMIN SERPL-MCNC: 3.7 G/DL (ref 3.5–4.8)
ALP LIVER SERPL-CCNC: 67 U/L (ref 45–117)
ALT SERPL-CCNC: 20 U/L (ref 17–63)
AST SERPL-CCNC: 11 U/L (ref 15–41)
BASOPHILS # BLD AUTO: 0.04 X10(3) UL (ref 0–0.1)
BASOPHILS NFR BLD AUTO: 0.3 %
BILIRUB SERPL-MCNC: 0.7 MG/DL (ref 0.1–2)
BUN BLD-MCNC: 16 MG/DL (ref 8–20)
CALCIUM BLD-MCNC: 8.9 MG/DL (ref 8.3–10.3)
CHLORIDE: 109 MMOL/L (ref 101–111)
CO2: 22 MMOL/L (ref 22–32)
CREAT BLD-MCNC: 0.94 MG/DL (ref 0.7–1.3)
EOSINOPHIL # BLD AUTO: 0.01 X10(3) UL (ref 0–0.3)
EOSINOPHIL NFR BLD AUTO: 0.1 %
ERYTHROCYTE [DISTWIDTH] IN BLOOD BY AUTOMATED COUNT: 14.9 % (ref 11.5–16)
GLUCOSE BLD-MCNC: 107 MG/DL (ref 70–99)
HCT VFR BLD AUTO: 40 % (ref 37–53)
HGB BLD-MCNC: 12.8 G/DL (ref 13–17)
IMMATURE GRANULOCYTE COUNT: 0.04 X10(3) UL (ref 0–1)
IMMATURE GRANULOCYTE RATIO %: 0.3 %
LYMPHOCYTES # BLD AUTO: 2.26 X10(3) UL (ref 0.9–4)
LYMPHOCYTES NFR BLD AUTO: 18 %
M PROTEIN MFR SERPL ELPH: 7.8 G/DL (ref 6.1–8.3)
MCH RBC QN AUTO: 26.8 PG (ref 27–33.2)
MCHC RBC AUTO-ENTMCNC: 32 G/DL (ref 31–37)
MCV RBC AUTO: 83.7 FL (ref 80–99)
MONOCYTES # BLD AUTO: 1.06 X10(3) UL (ref 0.1–1)
MONOCYTES NFR BLD AUTO: 8.4 %
NEUTROPHIL ABS PRELIM: 9.16 X10 (3) UL (ref 1.3–6.7)
NEUTROPHILS # BLD AUTO: 9.16 X10(3) UL (ref 1.3–6.7)
NEUTROPHILS NFR BLD AUTO: 72.9 %
PLATELET # BLD AUTO: 294 10(3)UL (ref 150–450)
POTASSIUM SERPL-SCNC: 3.8 MMOL/L (ref 3.6–5.1)
RBC # BLD AUTO: 4.78 X10(6)UL (ref 3.8–5.8)
RED CELL DISTRIBUTION WIDTH-SD: 45.4 FL (ref 35.1–46.3)
SODIUM SERPL-SCNC: 139 MMOL/L (ref 136–144)
WBC # BLD AUTO: 12.6 X10(3) UL (ref 4–13)

## 2018-03-04 PROCEDURE — 0DB78ZX EXCISION OF STOMACH, PYLORUS, VIA NATURAL OR ARTIFICIAL OPENING ENDOSCOPIC, DIAGNOSTIC: ICD-10-PCS | Performed by: INTERNAL MEDICINE

## 2018-03-04 PROCEDURE — 99239 HOSP IP/OBS DSCHRG MGMT >30: CPT | Performed by: HOSPITALIST

## 2018-03-04 RX ORDER — PANTOPRAZOLE SODIUM 40 MG/1
40 TABLET, DELAYED RELEASE ORAL
Status: DISCONTINUED | OUTPATIENT
Start: 2018-03-04 | End: 2018-03-04

## 2018-03-04 RX ORDER — SODIUM CHLORIDE, SODIUM LACTATE, POTASSIUM CHLORIDE, CALCIUM CHLORIDE 600; 310; 30; 20 MG/100ML; MG/100ML; MG/100ML; MG/100ML
INJECTION, SOLUTION INTRAVENOUS CONTINUOUS
Status: DISCONTINUED | OUTPATIENT
Start: 2018-03-04 | End: 2018-03-04

## 2018-03-04 RX ORDER — POTASSIUM CHLORIDE 20 MEQ/1
40 TABLET, EXTENDED RELEASE ORAL ONCE
Status: COMPLETED | OUTPATIENT
Start: 2018-03-04 | End: 2018-03-04

## 2018-03-04 RX ORDER — NALOXONE HYDROCHLORIDE 0.4 MG/ML
80 INJECTION, SOLUTION INTRAMUSCULAR; INTRAVENOUS; SUBCUTANEOUS AS NEEDED
Status: ACTIVE | OUTPATIENT
Start: 2018-03-04 | End: 2018-03-04

## 2018-03-04 RX ORDER — PANTOPRAZOLE SODIUM 40 MG/1
40 TABLET, DELAYED RELEASE ORAL
Qty: 60 TABLET | Refills: 1 | Status: SHIPPED | OUTPATIENT
Start: 2018-03-04 | End: 2018-04-03

## 2018-03-04 NOTE — ANESTHESIA POSTPROCEDURE EVALUATION
[place] Patient Status:  Inpatient   Age/Gender 78year old male MRN [id number]   Location [address] [facility]. Attending Caren Ernandez MD   Hosp Day # 2 PCP None Pcp       Anesthesia Post-op Note    Procedure(s):  23 Bharti Zhao

## 2018-03-04 NOTE — ANESTHESIA PREPROCEDURE EVALUATION
PRE-OP EVALUATION    Patient Name: Katie Rice    Pre-op Diagnosis: Abdominal pain [R10.9]    Procedure(s):  ESOPHAGOGASTRODUODENOSCOPY (EGD)    Surgeon(s) and Role:     Kristopher Aleman MD - Primary    Pre-op vitals reviewed. Temp: 98.4 °F (36. Intravenous Once   [COMPLETED] iodixanol (VISIPAQUE) 320 MG/ML injection 100 mL 100 mL Intravenous ONCE PRN   [] 0.9%  NaCl infusion  Intravenous Continuous   [COMPLETED] ondansetron HCl (ZOFRAN) injection 4 mg 4 mg Intravenous Q4H PRN   [COMPLETED] 40.0 03/04/2018   MCV 83.7 03/04/2018   MCH 26.8 (L) 03/04/2018   MCHC 32.0 03/04/2018   RDW 14.9 03/04/2018   .0 03/04/2018       Lab Results  Component Value Date    03/04/2018   K 3.8 03/04/2018    03/04/2018   CO2 22.0 03/04/2018   B

## 2018-03-04 NOTE — PROGRESS NOTES
Spoke with dr Rola Beaver. Informed md of pt's wishes to go home. md states ok to d/c home if ok with gi.  md also states that pt should call physicians referral line tomorrow to connect with a pcp for f/u this week for elevated b/p.

## 2018-03-04 NOTE — PLAN OF CARE
Problem: GASTROINTESTINAL - ADULT  Goal: Maintains or returns to baseline bowel function  INTERVENTIONS:  - Assess bowel function  - Maintain adequate hydration with IV or PO as ordered and tolerated  - Evaluate effectiveness of GI medications  - Encourage strengthening/mobility  - Encourage toileting schedule   Outcome: Progressing  Up ambulating with steady gait    Problem: RISK FOR INFECTION - ADULT  Goal: Absence of fever/infection during anticipated neutropenic period  INTERVENTIONS  - Monitor WBC  - Ad

## 2018-03-04 NOTE — PROGRESS NOTES
Pt states ' I am ready to go home. 4 days is enough'. Informed pt that he needs to eat prior to discharge. Pt requesting ensure.

## 2018-03-04 NOTE — PROGRESS NOTES
[facility] HOSPITALIST  Progress Note     Baldemar Lefort Patient Status:  Observation    10/6/1950 MRN [id number]   [facility] 3NW-A Attending Torri Zhou MD   [facility] Day # 2 PCP None Pcp     Chief Complaint: abd pain, N/V    S: Patient has • Potassium Chloride ER  40 mEq Oral Once   • Pantoprazole Sodium  40 mg Oral BID AC   • ondansetron HCl  4 mg Intravenous Once   • metoprolol Tartrate  25 mg Oral 2x Daily(Beta Blocker)   • enoxaparin  40 mg Subcutaneous Daily       ASSESSMENT / PLAN:

## 2018-03-04 NOTE — PROGRESS NOTES
Pt tolerating chicken noodle soup & apple sauce. Requesting discharge, and requesting note for work stating he can return to work on Tuesday. Paging dr Marcial Pickens.

## 2018-03-04 NOTE — PROGRESS NOTES
Spoke with dr Nelia Arias. Informed md that pt tolerating apple sauce & chicken noodle soup.  md states ok to d/c home with protonix 40mg po bid x 4 weeks & f/u gi np in 1-2 weeks. Dr Doris Vaughn pagerahul.

## 2018-03-04 NOTE — PLAN OF CARE
GASTROINTESTINAL - ADULT    • Minimal or absence of nausea and vomiting Not Progressing          GASTROINTESTINAL - ADULT    • Maintains or returns to baseline bowel function Progressing        METABOLIC/FLUID AND ELECTROLYTES - ADULT    • Electrolytes bina

## 2018-03-04 NOTE — OPERATIVE REPORT
Operative Report-Esophagogastroduodenoscopy      PREOPERATIVE DIAGNOSIS/INDICATION:   1. Abdominal pain  POSTOPERTATIVE DIAGNOSIS:  1. LA Grade B esophagitis  2. Mild gastritis  3. Mild duodenitis  4.  Hiatal hernia  PROCEDURE PERFO ischemia?  - Trial of advancing diet.  If symptoms return, would perform CTA abdomen/pelvis  CC Report:     Rigoberto Uribe  3/4/2018  9:39 AM

## 2018-03-05 NOTE — PROGRESS NOTES
Pt d/c home. D/c instructions given to pt along with rx for protonix, instructed that he will need to call & connect with a pcp to f/u htn this week, f/u gi in 1-2 weeks, diet, hydration, activity & f/u care. verbalized understanding of all instructions.

## 2018-06-02 ENCOUNTER — HOSPITAL ENCOUNTER (EMERGENCY)
Facility: HOSPITAL | Age: 68
Discharge: HOME | End: 2018-06-02
Attending: EMERGENCY MEDICINE
Payer: COMMERCIAL

## 2018-06-02 VITALS
SYSTOLIC BLOOD PRESSURE: 157 MMHG | HEIGHT: 68 IN | BODY MASS INDEX: 28.04 KG/M2 | DIASTOLIC BLOOD PRESSURE: 71 MMHG | RESPIRATION RATE: 20 BRPM | HEART RATE: 88 BPM | OXYGEN SATURATION: 97 % | WEIGHT: 185 LBS | TEMPERATURE: 98 F

## 2018-06-02 DIAGNOSIS — R11.2 NAUSEA AND VOMITING, INTRACTABILITY OF VOMITING NOT SPECIFIED, UNSPECIFIED VOMITING TYPE: Primary | ICD-10-CM

## 2018-06-02 DIAGNOSIS — K52.9 ACUTE GASTROENTERITIS: ICD-10-CM

## 2018-06-02 PROCEDURE — 81001 URINALYSIS AUTO W/SCOPE: CPT | Performed by: EMERGENCY MEDICINE

## 2018-06-02 PROCEDURE — 83690 ASSAY OF LIPASE: CPT | Performed by: EMERGENCY MEDICINE

## 2018-06-02 PROCEDURE — C9113 INJ PANTOPRAZOLE SODIUM, VIA: HCPCS | Performed by: EMERGENCY MEDICINE

## 2018-06-02 PROCEDURE — 83690 ASSAY OF LIPASE: CPT

## 2018-06-02 PROCEDURE — 96375 TX/PRO/DX INJ NEW DRUG ADDON: CPT

## 2018-06-02 PROCEDURE — 99284 EMERGENCY DEPT VISIT MOD MDM: CPT

## 2018-06-02 PROCEDURE — 85025 COMPLETE CBC W/AUTO DIFF WBC: CPT | Performed by: EMERGENCY MEDICINE

## 2018-06-02 PROCEDURE — 85025 COMPLETE CBC W/AUTO DIFF WBC: CPT

## 2018-06-02 PROCEDURE — 84484 ASSAY OF TROPONIN QUANT: CPT | Performed by: EMERGENCY MEDICINE

## 2018-06-02 PROCEDURE — 93010 ELECTROCARDIOGRAM REPORT: CPT

## 2018-06-02 PROCEDURE — 96361 HYDRATE IV INFUSION ADD-ON: CPT

## 2018-06-02 PROCEDURE — 80053 COMPREHEN METABOLIC PANEL: CPT

## 2018-06-02 PROCEDURE — 93005 ELECTROCARDIOGRAM TRACING: CPT

## 2018-06-02 PROCEDURE — 80053 COMPREHEN METABOLIC PANEL: CPT | Performed by: EMERGENCY MEDICINE

## 2018-06-02 PROCEDURE — 99285 EMERGENCY DEPT VISIT HI MDM: CPT

## 2018-06-02 PROCEDURE — 96374 THER/PROPH/DIAG INJ IV PUSH: CPT

## 2018-06-02 RX ORDER — ONDANSETRON 2 MG/ML
4 INJECTION INTRAMUSCULAR; INTRAVENOUS ONCE
Status: COMPLETED | OUTPATIENT
Start: 2018-06-02 | End: 2018-06-02

## 2018-06-02 RX ORDER — SODIUM CHLORIDE 9 MG/ML
INJECTION, SOLUTION INTRAVENOUS CONTINUOUS
Status: DISCONTINUED | OUTPATIENT
Start: 2018-06-02 | End: 2018-06-02

## 2018-06-02 RX ORDER — FAMOTIDINE 20 MG/1
20 TABLET ORAL 2 TIMES DAILY PRN
Qty: 30 TABLET | Refills: 0 | Status: SHIPPED | OUTPATIENT
Start: 2018-06-02 | End: 2018-06-08

## 2018-06-02 RX ORDER — LORAZEPAM 2 MG/ML
1 INJECTION INTRAMUSCULAR ONCE
Status: COMPLETED | OUTPATIENT
Start: 2018-06-02 | End: 2018-06-02

## 2018-06-02 RX ORDER — METOCLOPRAMIDE HYDROCHLORIDE 5 MG/ML
10 INJECTION INTRAMUSCULAR; INTRAVENOUS ONCE
Status: COMPLETED | OUTPATIENT
Start: 2018-06-02 | End: 2018-06-02

## 2018-06-02 RX ORDER — LORAZEPAM 2 MG/ML
INJECTION INTRAMUSCULAR
Status: DISCONTINUED
Start: 2018-06-02 | End: 2018-06-02

## 2018-06-02 RX ORDER — MORPHINE SULFATE 4 MG/ML
4 INJECTION, SOLUTION INTRAMUSCULAR; INTRAVENOUS ONCE
Status: DISCONTINUED | OUTPATIENT
Start: 2018-06-02 | End: 2018-06-02

## 2018-06-02 RX ORDER — ONDANSETRON 4 MG/1
4 TABLET, ORALLY DISINTEGRATING ORAL EVERY 4 HOURS PRN
Qty: 10 TABLET | Refills: 0 | Status: ON HOLD | OUTPATIENT
Start: 2018-06-02 | End: 2018-06-10

## 2018-06-02 RX ORDER — DIPHENHYDRAMINE HYDROCHLORIDE 50 MG/ML
25 INJECTION INTRAMUSCULAR; INTRAVENOUS ONCE
Status: COMPLETED | OUTPATIENT
Start: 2018-06-02 | End: 2018-06-02

## 2018-06-02 NOTE — ED PROVIDER NOTES
Patient Seen in: [facility] Emergency Department    History   Patient presents with:  Abdomen/Flank Pain (GI/)    Stated Complaint: NVD    HPI    States that he Love's beef last night and believes it to have been tainted and that he now has fo Glucose 172 (*)     Total Protein 8.6 (*)     All other components within normal limits   LIPASE - Abnormal; Notable for the following:     Lipase 67 (*)     All other components within normal limits   URINALYSIS WITH CULTURE REFLEX - Abnormal; Notable for after the Zofran he stated that he still felt nauseous therefore given some Reglan and Benadryl and Ativan.   Initially he had some pain in his abdomen but that has resolved he has been observed in the emergency department for multiple hours because he stat

## 2018-06-08 ENCOUNTER — HOSPITAL ENCOUNTER (OUTPATIENT)
Facility: HOSPITAL | Age: 68
Setting detail: OBSERVATION
Discharge: HOME | End: 2018-06-10
Attending: EMERGENCY MEDICINE | Admitting: HOSPITALIST
Payer: COMMERCIAL

## 2018-06-08 ENCOUNTER — APPOINTMENT (OUTPATIENT)
Dept: CT IMAGING | Facility: HOSPITAL | Age: 68
End: 2018-06-08
Attending: EMERGENCY MEDICINE
Payer: COMMERCIAL

## 2018-06-08 DIAGNOSIS — R10.9 ABDOMINAL PAIN, ACUTE: ICD-10-CM

## 2018-06-08 DIAGNOSIS — N17.9 ACUTE RENAL FAILURE, UNSPECIFIED ACUTE RENAL FAILURE TYPE (HCC): Primary | ICD-10-CM

## 2018-06-08 PROCEDURE — 83690 ASSAY OF LIPASE: CPT | Performed by: EMERGENCY MEDICINE

## 2018-06-08 PROCEDURE — 96361 HYDRATE IV INFUSION ADD-ON: CPT

## 2018-06-08 PROCEDURE — 99285 EMERGENCY DEPT VISIT HI MDM: CPT

## 2018-06-08 PROCEDURE — 85025 COMPLETE CBC W/AUTO DIFF WBC: CPT

## 2018-06-08 PROCEDURE — 96374 THER/PROPH/DIAG INJ IV PUSH: CPT

## 2018-06-08 PROCEDURE — 74177 CT ABD & PELVIS W/CONTRAST: CPT | Performed by: EMERGENCY MEDICINE

## 2018-06-08 PROCEDURE — 83690 ASSAY OF LIPASE: CPT

## 2018-06-08 PROCEDURE — 80053 COMPREHEN METABOLIC PANEL: CPT | Performed by: EMERGENCY MEDICINE

## 2018-06-08 PROCEDURE — 80053 COMPREHEN METABOLIC PANEL: CPT

## 2018-06-08 PROCEDURE — 85025 COMPLETE CBC W/AUTO DIFF WBC: CPT | Performed by: EMERGENCY MEDICINE

## 2018-06-08 RX ORDER — HYDROCODONE BITARTRATE AND ACETAMINOPHEN 5; 325 MG/1; MG/1
1 TABLET ORAL EVERY 4 HOURS PRN
Status: DISCONTINUED | OUTPATIENT
Start: 2018-06-08 | End: 2018-06-10

## 2018-06-08 RX ORDER — SODIUM CHLORIDE 9 MG/ML
INJECTION, SOLUTION INTRAVENOUS CONTINUOUS
Status: DISCONTINUED | OUTPATIENT
Start: 2018-06-08 | End: 2018-06-08

## 2018-06-08 RX ORDER — HYDROMORPHONE HYDROCHLORIDE 1 MG/ML
0.5 INJECTION, SOLUTION INTRAMUSCULAR; INTRAVENOUS; SUBCUTANEOUS EVERY 30 MIN PRN
Status: DISCONTINUED | OUTPATIENT
Start: 2018-06-08 | End: 2018-06-08 | Stop reason: DRUGHIGH

## 2018-06-08 RX ORDER — SODIUM CHLORIDE 9 MG/ML
INJECTION, SOLUTION INTRAVENOUS CONTINUOUS
Status: DISCONTINUED | OUTPATIENT
Start: 2018-06-08 | End: 2018-06-10

## 2018-06-08 RX ORDER — HYDROCODONE BITARTRATE AND ACETAMINOPHEN 5; 325 MG/1; MG/1
2 TABLET ORAL EVERY 4 HOURS PRN
Status: DISCONTINUED | OUTPATIENT
Start: 2018-06-08 | End: 2018-06-10

## 2018-06-08 RX ORDER — METOCLOPRAMIDE HYDROCHLORIDE 5 MG/ML
10 INJECTION INTRAMUSCULAR; INTRAVENOUS EVERY 8 HOURS PRN
Status: DISCONTINUED | OUTPATIENT
Start: 2018-06-08 | End: 2018-06-10

## 2018-06-08 RX ORDER — MAGNESIUM HYDROXIDE/ALUMINUM HYDROXICE/SIMETHICONE 120; 1200; 1200 MG/30ML; MG/30ML; MG/30ML
30 SUSPENSION ORAL ONCE
Status: COMPLETED | OUTPATIENT
Start: 2018-06-08 | End: 2018-06-08

## 2018-06-08 RX ORDER — SODIUM PHOSPHATE, DIBASIC AND SODIUM PHOSPHATE, MONOBASIC 7; 19 G/133ML; G/133ML
1 ENEMA RECTAL ONCE AS NEEDED
Status: DISCONTINUED | OUTPATIENT
Start: 2018-06-08 | End: 2018-06-10

## 2018-06-08 RX ORDER — ONDANSETRON 2 MG/ML
4 INJECTION INTRAMUSCULAR; INTRAVENOUS EVERY 6 HOURS PRN
Status: DISCONTINUED | OUTPATIENT
Start: 2018-06-08 | End: 2018-06-10

## 2018-06-08 RX ORDER — PANTOPRAZOLE SODIUM 40 MG/1
40 TABLET, DELAYED RELEASE ORAL
COMMUNITY
End: 2018-12-04

## 2018-06-08 RX ORDER — HYDROMORPHONE HYDROCHLORIDE 1 MG/ML
0.5 INJECTION, SOLUTION INTRAMUSCULAR; INTRAVENOUS; SUBCUTANEOUS EVERY 30 MIN PRN
Status: DISCONTINUED | OUTPATIENT
Start: 2018-06-08 | End: 2018-06-08

## 2018-06-08 RX ORDER — HYDROMORPHONE HYDROCHLORIDE 1 MG/ML
0.2 INJECTION, SOLUTION INTRAMUSCULAR; INTRAVENOUS; SUBCUTANEOUS EVERY 2 HOUR PRN
Status: DISCONTINUED | OUTPATIENT
Start: 2018-06-08 | End: 2018-06-10

## 2018-06-08 RX ORDER — MULTIVITAMIN WITH FOLIC ACID 400 MCG
1 TABLET ORAL DAILY
COMMUNITY

## 2018-06-08 RX ORDER — BISACODYL 10 MG
10 SUPPOSITORY, RECTAL RECTAL
Status: DISCONTINUED | OUTPATIENT
Start: 2018-06-08 | End: 2018-06-10

## 2018-06-08 RX ORDER — HYDROMORPHONE HYDROCHLORIDE 1 MG/ML
0.8 INJECTION, SOLUTION INTRAMUSCULAR; INTRAVENOUS; SUBCUTANEOUS EVERY 2 HOUR PRN
Status: DISCONTINUED | OUTPATIENT
Start: 2018-06-08 | End: 2018-06-10

## 2018-06-08 RX ORDER — ONDANSETRON 2 MG/ML
4 INJECTION INTRAMUSCULAR; INTRAVENOUS EVERY 4 HOURS PRN
Status: DISCONTINUED | OUTPATIENT
Start: 2018-06-08 | End: 2018-06-08

## 2018-06-08 RX ORDER — HEPARIN SODIUM 5000 [USP'U]/ML
5000 INJECTION, SOLUTION INTRAVENOUS; SUBCUTANEOUS EVERY 8 HOURS SCHEDULED
Status: DISCONTINUED | OUTPATIENT
Start: 2018-06-08 | End: 2018-06-10

## 2018-06-08 RX ORDER — HYDROMORPHONE HYDROCHLORIDE 1 MG/ML
0.4 INJECTION, SOLUTION INTRAMUSCULAR; INTRAVENOUS; SUBCUTANEOUS EVERY 2 HOUR PRN
Status: DISCONTINUED | OUTPATIENT
Start: 2018-06-08 | End: 2018-06-10

## 2018-06-08 RX ORDER — POLYETHYLENE GLYCOL 3350 17 G/17G
17 POWDER, FOR SOLUTION ORAL DAILY PRN
Status: DISCONTINUED | OUTPATIENT
Start: 2018-06-08 | End: 2018-06-10

## 2018-06-08 RX ORDER — ACETAMINOPHEN 325 MG/1
650 TABLET ORAL EVERY 4 HOURS PRN
Status: DISCONTINUED | OUTPATIENT
Start: 2018-06-08 | End: 2018-06-10

## 2018-06-08 NOTE — PLAN OF CARE
GASTROINTESTINAL - ADULT    • Minimal or absence of nausea and vomiting Not Progressing    • Maintains or returns to baseline bowel function Not Progressing          GASTROINTESTINAL - ADULT    • Minimal or absence of nausea and vomiting Not Progressing

## 2018-06-08 NOTE — ED INITIAL ASSESSMENT (HPI)
PT SEEN IN ED A FEW DAYS AGO FOR ABD PAIN AND WEAKNESS. PT THOUGHT AT THE TIME IT WAS THE FOOD HE ATE. PT HAS HAD NO IMPROVEMENT IN THE PAIN.

## 2018-06-08 NOTE — ED NOTES
Pt states he does not feel any better after receiving the GI cocktail. Dr. Jony Henderson at bedside.

## 2018-06-08 NOTE — H&P
[facility] Hospitalist H&P       CC: abdominal pain, n/v    PCP: Rayshawn Alexander MD    History of Present Illness: Pt is a 79 yo with HTN, diverticulosis, RCC s/p nephrectomy, who is admitted for abdominal pain n/v.   Reports sharp constant moderate-severe L si Heart:  Regular rate and rhythm, S1, S2 normal,no LE edema   Abdomen:   Soft, non-tender. Bowel sounds normal. Non distended, no overt hernias   Extremities: Extremities normal, atraumatic, no cyanosis or edema.    Skin: Skin color, texture, turgor normal diverticulosis, RCC s/p nephrectomy  -stable, f/u outpatient    **PPx-scds, heparin subq    Outpatient records or previous hospital records reviewed. Further recommendations pending patient's clinical course.   [facility] hospitalist to continue to follow starla

## 2018-06-08 NOTE — ED PROVIDER NOTES
Patient Seen in: [facility] Emergency Department    History   Patient presents with:  Abdomen/Flank Pain (GI/)  Fatigue (constitutional, neurologic)    Stated Complaint: ABD PAIN    HPI    71-year-old male presents with abdominal pain.   He reports exchange and clear   Heart: regular rate rhythm and no murmur   Abdomen: Soft and nontender. Normal bowel sounds throughout. No abdominal masses.   No peritoneal signs   Extremities: no edema, normal peripheral pulses   Neuro: Alert oriented and nonfocal increased from previous with an elevated BUN likely due to dehydration. IV fluids in progress. Patient to be admitted for abdominal pain and acute renal insufficiency. CT scan pending and patient signed out to my colleague awaiting CT results.   Admitted

## 2018-06-09 PROCEDURE — 85025 COMPLETE CBC W/AUTO DIFF WBC: CPT | Performed by: HOSPITALIST

## 2018-06-09 PROCEDURE — 80048 BASIC METABOLIC PNL TOTAL CA: CPT | Performed by: HOSPITALIST

## 2018-06-09 PROCEDURE — 83735 ASSAY OF MAGNESIUM: CPT | Performed by: HOSPITALIST

## 2018-06-09 PROCEDURE — C9113 INJ PANTOPRAZOLE SODIUM, VIA: HCPCS | Performed by: HOSPITALIST

## 2018-06-09 RX ORDER — POTASSIUM CHLORIDE 14.9 MG/ML
20 INJECTION INTRAVENOUS ONCE
Status: COMPLETED | OUTPATIENT
Start: 2018-06-09 | End: 2018-06-10

## 2018-06-09 RX ORDER — DOCUSATE SODIUM 100 MG/1
100 CAPSULE, LIQUID FILLED ORAL 2 TIMES DAILY PRN
Status: DISCONTINUED | OUTPATIENT
Start: 2018-06-09 | End: 2018-06-10

## 2018-06-09 RX ORDER — SENNOSIDES 8.6 MG
8.6 TABLET ORAL 2 TIMES DAILY PRN
Status: DISCONTINUED | OUTPATIENT
Start: 2018-06-09 | End: 2018-06-10

## 2018-06-09 RX ORDER — MAGNESIUM HYDROXIDE/ALUMINUM HYDROXICE/SIMETHICONE 120; 1200; 1200 MG/30ML; MG/30ML; MG/30ML
30 SUSPENSION ORAL ONCE
Status: COMPLETED | OUTPATIENT
Start: 2018-06-09 | End: 2018-06-09

## 2018-06-09 NOTE — PROGRESS NOTES
Alert,oriented. Patient complains of abdominal pain this shift,Dilaudid 0.8 IV given twice. No nausea reported. Blood pressure slightly elevated,Patient denies taking meds for HtN. Complains of heartburn. Maalox given,with relief noted.

## 2018-06-09 NOTE — PROGRESS NOTES
[facility] Hospitalist Progress Note                                                                   [place]  10/6/1950    CC: abd pain    SUBJECTIVE:    Had nausea this AM, but states he diverticulosis, RCC s/p nephrectomy, who is admitted for abdominal pain n/v.     **abdominal pain, n/v-  -unclear etiology, consider viral gastroenteritis  -CT a/p unremarkable  -continue PPI, supportive care for now  -of note, was admitted 3/2018 for radha

## 2018-06-09 NOTE — PLAN OF CARE
GASTROINTESTINAL - ADULT    • Minimal or absence of nausea and vomiting Progressing    • Maintains or returns to baseline bowel function Progressing    • Maintains adequate nutritional intake (undernourished) Progressing        Nausea subsides , multiple l

## 2018-06-10 VITALS
DIASTOLIC BLOOD PRESSURE: 73 MMHG | RESPIRATION RATE: 16 BRPM | HEIGHT: 68 IN | HEART RATE: 84 BPM | SYSTOLIC BLOOD PRESSURE: 164 MMHG | OXYGEN SATURATION: 98 % | WEIGHT: 185 LBS | BODY MASS INDEX: 28.04 KG/M2 | TEMPERATURE: 98 F

## 2018-06-10 PROCEDURE — 84132 ASSAY OF SERUM POTASSIUM: CPT | Performed by: HOSPITALIST

## 2018-06-10 PROCEDURE — 80048 BASIC METABOLIC PNL TOTAL CA: CPT | Performed by: HOSPITALIST

## 2018-06-10 PROCEDURE — 85025 COMPLETE CBC W/AUTO DIFF WBC: CPT | Performed by: HOSPITALIST

## 2018-06-10 PROCEDURE — C9113 INJ PANTOPRAZOLE SODIUM, VIA: HCPCS | Performed by: HOSPITALIST

## 2018-06-10 RX ORDER — POTASSIUM CHLORIDE 20 MEQ/1
40 TABLET, EXTENDED RELEASE ORAL EVERY 4 HOURS
Status: DISCONTINUED | OUTPATIENT
Start: 2018-06-10 | End: 2018-06-10

## 2018-06-10 RX ORDER — POTASSIUM CHLORIDE 14.9 MG/ML
20 INJECTION INTRAVENOUS ONCE
Status: DISCONTINUED | OUTPATIENT
Start: 2018-06-10 | End: 2018-06-10

## 2018-06-10 RX ORDER — PSEUDOEPHEDRINE HCL 30 MG
100 TABLET ORAL 2 TIMES DAILY PRN
Qty: 60 CAPSULE | Refills: 0 | Status: SHIPPED | OUTPATIENT
Start: 2018-06-10 | End: 2018-12-04

## 2018-06-10 RX ORDER — ONDANSETRON 4 MG/1
4 TABLET, ORALLY DISINTEGRATING ORAL EVERY 4 HOURS PRN
Qty: 10 TABLET | Refills: 0 | Status: SHIPPED | OUTPATIENT
Start: 2018-06-10 | End: 2018-06-17

## 2018-06-10 NOTE — PROGRESS NOTES
Dr Leal Samples in , discharge summary completed and discussed , discharged for home ambulatory , will take a cab home

## 2018-06-10 NOTE — PLAN OF CARE
GASTROINTESTINAL - ADULT    • Maintains or returns to baseline bowel function Progressing        PAIN - ADULT    • Verbalizes/displays adequate comfort level or patient's stated pain goal Progressing        Pt aox4. Abdomen soft,bs present. C/O of abdominal

## 2018-06-10 NOTE — DISCHARGE SUMMARY
General Medicine Discharge Summary     Patient ID:  Isai Rumpf  78year old  10/6/1950    Admit date: 6/8/2018    Discharge date and time: 6/10/18    Attending Physician: Darien Webb MD     Primary extensive GI workup where EGD/colonoscopy revealed esophagitis, gastritis, duodenitis though GI didn't feel these explained pt's symptoms.  Pt had improved symptomatically then and was discharged     **acute kidney injury  -improved with IVF     **hyponatr

## 2018-06-10 NOTE — PROGRESS NOTES
To whom it may concern,        Bandar Reyez was hospitalized at [facility] from 6/8/2018 to 06/10/18. Bandar Reyez may return to work on 6/13/18. If there are any questions, please do not hesitate to contact me.         Sincerely,      Roseline Cerrato

## 2018-11-14 NOTE — ED NOTES
"Anesthesia Post Evaluation    Patient: Mavis Nettles    Procedure(s) Performed: Procedure(s) (LRB):  EXTRACTION, CATARACT, WITH IOL INSERTION (Left)    Final Anesthesia Type: MAC  Patient location during evaluation: PACU  Patient participation: Yes- Able to Participate  Level of consciousness: awake and alert  Post-procedure vital signs: reviewed and stable  Pain management: adequate  Airway patency: patent  PONV status at discharge: No PONV  Anesthetic complications: no      Cardiovascular status: hemodynamically stable  Respiratory status: unassisted and room air  Hydration status: euvolemic  Follow-up not needed.        Visit Vitals  /75   Pulse 72   Temp 36.7 °C (98 °F) (Skin)   Resp 20   Ht 5' 8" (1.727 m)   Wt 86.2 kg (190 lb)   SpO2 96%   Breastfeeding? No   BMI 28.89 kg/m²       Pain/Leilani Score: Pain Assessment Performed: Yes (11/14/2018  8:30 AM)  Presence of Pain: denies (11/14/2018  8:30 AM)  Leilani Score: 10 (11/14/2018  8:30 AM)        " Report given to Floor RN.  Pt resting

## 2018-11-17 ENCOUNTER — HOSPITAL ENCOUNTER (INPATIENT)
Facility: HOSPITAL | Age: 68
LOS: 1 days | Discharge: HOME | DRG: 392 | End: 2018-11-21
Attending: EMERGENCY MEDICINE | Admitting: INTERNAL MEDICINE
Payer: COMMERCIAL

## 2018-11-17 ENCOUNTER — APPOINTMENT (OUTPATIENT)
Dept: GENERAL RADIOLOGY | Facility: HOSPITAL | Age: 68
DRG: 392 | End: 2018-11-17
Attending: EMERGENCY MEDICINE
Payer: COMMERCIAL

## 2018-11-17 ENCOUNTER — APPOINTMENT (OUTPATIENT)
Dept: CT IMAGING | Facility: HOSPITAL | Age: 68
DRG: 392 | End: 2018-11-17
Attending: EMERGENCY MEDICINE
Payer: COMMERCIAL

## 2018-11-17 DIAGNOSIS — R10.9 ABDOMINAL PAIN OF UNKNOWN ETIOLOGY: Primary | ICD-10-CM

## 2018-11-17 DIAGNOSIS — R11.2 INTRACTABLE VOMITING WITH NAUSEA, UNSPECIFIED VOMITING TYPE: ICD-10-CM

## 2018-11-17 PROCEDURE — 80053 COMPREHEN METABOLIC PANEL: CPT | Performed by: EMERGENCY MEDICINE

## 2018-11-17 PROCEDURE — 74177 CT ABD & PELVIS W/CONTRAST: CPT | Performed by: EMERGENCY MEDICINE

## 2018-11-17 PROCEDURE — 83690 ASSAY OF LIPASE: CPT | Performed by: EMERGENCY MEDICINE

## 2018-11-17 PROCEDURE — 96375 TX/PRO/DX INJ NEW DRUG ADDON: CPT

## 2018-11-17 PROCEDURE — 96361 HYDRATE IV INFUSION ADD-ON: CPT

## 2018-11-17 PROCEDURE — 81003 URINALYSIS AUTO W/O SCOPE: CPT | Performed by: EMERGENCY MEDICINE

## 2018-11-17 PROCEDURE — 85025 COMPLETE CBC W/AUTO DIFF WBC: CPT | Performed by: EMERGENCY MEDICINE

## 2018-11-17 PROCEDURE — C9113 INJ PANTOPRAZOLE SODIUM, VIA: HCPCS | Performed by: EMERGENCY MEDICINE

## 2018-11-17 PROCEDURE — 99285 EMERGENCY DEPT VISIT HI MDM: CPT

## 2018-11-17 PROCEDURE — 71046 X-RAY EXAM CHEST 2 VIEWS: CPT | Performed by: EMERGENCY MEDICINE

## 2018-11-17 PROCEDURE — 93010 ELECTROCARDIOGRAM REPORT: CPT

## 2018-11-17 PROCEDURE — 96374 THER/PROPH/DIAG INJ IV PUSH: CPT

## 2018-11-17 PROCEDURE — 93005 ELECTROCARDIOGRAM TRACING: CPT

## 2018-11-17 RX ORDER — ACETAMINOPHEN 325 MG/1
650 TABLET ORAL EVERY 6 HOURS PRN
Status: DISCONTINUED | OUTPATIENT
Start: 2018-11-17 | End: 2018-11-21

## 2018-11-17 RX ORDER — MORPHINE SULFATE 4 MG/ML
4 INJECTION, SOLUTION INTRAMUSCULAR; INTRAVENOUS EVERY 30 MIN PRN
Status: DISCONTINUED | OUTPATIENT
Start: 2018-11-17 | End: 2018-11-18

## 2018-11-17 RX ORDER — MORPHINE SULFATE 4 MG/ML
4 INJECTION, SOLUTION INTRAMUSCULAR; INTRAVENOUS ONCE
Status: COMPLETED | OUTPATIENT
Start: 2018-11-17 | End: 2018-11-17

## 2018-11-17 RX ORDER — ONDANSETRON 2 MG/ML
4 INJECTION INTRAMUSCULAR; INTRAVENOUS EVERY 4 HOURS PRN
Status: DISCONTINUED | OUTPATIENT
Start: 2018-11-17 | End: 2018-11-17

## 2018-11-17 RX ORDER — METOCLOPRAMIDE HYDROCHLORIDE 5 MG/ML
5 INJECTION INTRAMUSCULAR; INTRAVENOUS ONCE
Status: COMPLETED | OUTPATIENT
Start: 2018-11-17 | End: 2018-11-17

## 2018-11-17 RX ORDER — SODIUM CHLORIDE 9 MG/ML
INJECTION, SOLUTION INTRAVENOUS CONTINUOUS
Status: DISCONTINUED | OUTPATIENT
Start: 2018-11-17 | End: 2018-11-21

## 2018-11-17 RX ORDER — HEPARIN SODIUM 5000 [USP'U]/ML
5000 INJECTION, SOLUTION INTRAVENOUS; SUBCUTANEOUS EVERY 12 HOURS SCHEDULED
Status: DISCONTINUED | OUTPATIENT
Start: 2018-11-17 | End: 2018-11-21

## 2018-11-17 RX ORDER — SODIUM CHLORIDE 9 MG/ML
1000 INJECTION, SOLUTION INTRAVENOUS ONCE
Status: COMPLETED | OUTPATIENT
Start: 2018-11-17 | End: 2018-11-17

## 2018-11-17 RX ORDER — METOCLOPRAMIDE HYDROCHLORIDE 5 MG/ML
10 INJECTION INTRAMUSCULAR; INTRAVENOUS EVERY 8 HOURS PRN
Status: DISCONTINUED | OUTPATIENT
Start: 2018-11-17 | End: 2018-11-21

## 2018-11-17 RX ORDER — SODIUM CHLORIDE 9 MG/ML
125 INJECTION, SOLUTION INTRAVENOUS CONTINUOUS
Status: DISCONTINUED | OUTPATIENT
Start: 2018-11-17 | End: 2018-11-17

## 2018-11-17 RX ORDER — SODIUM CHLORIDE 9 MG/ML
INJECTION, SOLUTION INTRAVENOUS CONTINUOUS
Status: ACTIVE | OUTPATIENT
Start: 2018-11-17 | End: 2018-11-18

## 2018-11-17 RX ORDER — MORPHINE SULFATE 4 MG/ML
INJECTION, SOLUTION INTRAMUSCULAR; INTRAVENOUS
Status: DISPENSED
Start: 2018-11-17 | End: 2018-11-18

## 2018-11-17 RX ORDER — ONDANSETRON 2 MG/ML
4 INJECTION INTRAMUSCULAR; INTRAVENOUS EVERY 6 HOURS PRN
Status: DISCONTINUED | OUTPATIENT
Start: 2018-11-17 | End: 2018-11-21

## 2018-11-17 NOTE — ED INITIAL ASSESSMENT (HPI)
Patient brought by EMS for nausea and vomiting. States woke up this am with abdomen pain, nausea and vomiting. Got worst this last 2 hours PTA. \"i ate something bad last night\". Received 4 mg zofran IV by EMS. fsbs 160 mg/dl by ems.

## 2018-11-18 PROCEDURE — 84145 PROCALCITONIN (PCT): CPT | Performed by: INTERNAL MEDICINE

## 2018-11-18 PROCEDURE — 83735 ASSAY OF MAGNESIUM: CPT | Performed by: HOSPITALIST

## 2018-11-18 PROCEDURE — 85025 COMPLETE CBC W/AUTO DIFF WBC: CPT | Performed by: HOSPITALIST

## 2018-11-18 PROCEDURE — 80053 COMPREHEN METABOLIC PANEL: CPT | Performed by: HOSPITALIST

## 2018-11-18 RX ORDER — HYDRALAZINE HYDROCHLORIDE 25 MG/1
25 TABLET, FILM COATED ORAL EVERY 6 HOURS PRN
Status: DISCONTINUED | OUTPATIENT
Start: 2018-11-18 | End: 2018-11-18

## 2018-11-18 RX ORDER — HYDRALAZINE HYDROCHLORIDE 20 MG/ML
10 INJECTION INTRAMUSCULAR; INTRAVENOUS EVERY 4 HOURS PRN
Status: DISCONTINUED | OUTPATIENT
Start: 2018-11-18 | End: 2018-11-21

## 2018-11-18 RX ORDER — MORPHINE SULFATE 4 MG/ML
4 INJECTION, SOLUTION INTRAMUSCULAR; INTRAVENOUS
Status: DISCONTINUED | OUTPATIENT
Start: 2018-11-18 | End: 2018-11-19

## 2018-11-18 NOTE — PLAN OF CARE
GASTROINTESTINAL - ADULT    • Minimal or absence of nausea and vomiting Progressing        PAIN - ADULT    • Verbalizes/displays adequate comfort level or patient's stated pain goal Progressing          Pt. Received from ED via cart, sleeping.  Pt. C/o mid

## 2018-11-18 NOTE — H&P
[facility] Hospitalist History and Physical      Patient presents with:  Abdomen/Flank Pain (GI/)  Nausea/Vomiting/Diarrhea (gastrointestinal)       PCP: Tye Bush MD      History of Present Illness: Patient is a 80year old male with PMH sig for HTN who pres polyuria or polydipsia. HEMATOLOGICAL:  No easy bruising or bleeding.      OBJECTIVE:  BP (!) 163/63 (BP Location: Left arm)   Pulse 68   Temp 98.6 °F (37 °C) (Oral)   Resp 18   Ht 5' 8\" (1.727 m)   Wt 198 lb (89.8 kg)   SpO2 94%   BMI 30.11 kg/m²   Gener Technologist)  Patient has had NVD since last night. He feels he might have food poisoning from something he ate last night. FINDINGS:  Cardiac size and pulmonary vasculature are within normal limits. No pleural effusions. No pneumothorax.       Tanya Mays is normal in appearance. Minimal stranding within the mid mesenteric with small lymph nodes suggestive mesenteric panniculitis. PELVIS:  Bladder is unremarkable in appearance. Prostatomegaly measuring 5.5 x 4.7 cm.   Calcified phleboliths within the pelvi

## 2018-11-18 NOTE — ED PROVIDER NOTES
Patient Seen in: [facility] Emergency Department    History   Patient presents with:  Abdomen/Flank Pain (GI/)  Nausea/Vomiting/Diarrhea (gastrointestinal)    Stated Complaint: abd pain, N/V    HPI    59-year-old male who presents the emergency ro afebrile    Head is normocephalic atraumatic conjunctiva is clear. Sclerae anicteric. Neck is supple. Lungs are clear to auscultation bilaterally.   Heart is regular rate and rhythm without murmur gallop or rub    Abdomen is soft nondistended with diff CT scan of the abdomen pelvis reveals:    FINDINGS:    LUNG BASES:  Dependent atelectasis bilaterally.  Small hiatal hernia  LIVER:  Normal in shape and contour. BILIARY:  Cholelithiasis.    SPLEEN:  Normal.  No enlargement or focal lesion.    PA placed on a cardiac monitor was given IV fluids and some antiemetics and was observed. His laboratory workup reveals an elevated white count of 19,000 with a left shift. Chemistries are otherwise unremarkable. Urinalysis was negative.   CT scan of the ab

## 2018-11-18 NOTE — PROGRESS NOTES
11/18/18 Pt resting in bed at this time. Pain controlled by Morphine IVP PRN. RA. Tolerating clear liquids. Denies nausea. Voiding freely. STO stool sample still needed from previous order. IVF infusing. Pt states he is getting I.S. to 2000.  All questions

## 2018-11-18 NOTE — PROGRESS NOTES
Pt. Continues to c/o abdominal pain, administered Tylenol PO PRN and Morphine IV PRN with relief. BP still remain elevated. Dr. Kuo Payment made aware, orders received.

## 2018-11-19 PROCEDURE — 05H633Z INSERTION OF INFUSION DEVICE INTO LEFT SUBCLAVIAN VEIN, PERCUTANEOUS APPROACH: ICD-10-PCS | Performed by: INTERNAL MEDICINE

## 2018-11-19 PROCEDURE — 80048 BASIC METABOLIC PNL TOTAL CA: CPT | Performed by: INTERNAL MEDICINE

## 2018-11-19 PROCEDURE — C9113 INJ PANTOPRAZOLE SODIUM, VIA: HCPCS | Performed by: HOSPITALIST

## 2018-11-19 PROCEDURE — 36569 INSJ PICC 5 YR+ W/O IMAGING: CPT

## 2018-11-19 PROCEDURE — 85025 COMPLETE CBC W/AUTO DIFF WBC: CPT | Performed by: INTERNAL MEDICINE

## 2018-11-19 PROCEDURE — 76937 US GUIDE VASCULAR ACCESS: CPT

## 2018-11-19 RX ORDER — MORPHINE SULFATE 4 MG/ML
4 INJECTION, SOLUTION INTRAMUSCULAR; INTRAVENOUS EVERY 4 HOURS PRN
Status: DISCONTINUED | OUTPATIENT
Start: 2018-11-19 | End: 2018-11-21

## 2018-11-19 NOTE — PROGRESS NOTES
[facility] Hospitalist Progress Note     PCP: Lala Neal MD    Chief Complaint: follow-up    Overnight/Interim Events:      SUBJECTIVE:  Pt feeling a little better, still uncomfortable. Ants to stay on clears. Just got pain med. No n/v/d/abd pain.  Sxs started • sodium chloride 100 mL/hr at 11/19/18 1336     morphINE sulfate, hydrALAzine HCl, acetaminophen, ondansetron HCl, Metoclopramide HCl, influenza virus vaccine PF       Assessment/Plan:     #N/V and diarrhea- suspect viral GI illness  -Cont IVF  -CLD,

## 2018-11-19 NOTE — PLAN OF CARE
Assumed care of this patient 0; Pt hypertensive 742-490'U systolic; hydralazine 10 mg IVP given. Pt c/o abdominal and H/A pain. Pain modalities : Morphine and tylenol given    Maintenance fluids remain.     All questions answered and addressed at t

## 2018-11-19 NOTE — PROGRESS NOTES
Page sent to [facility] Dr. Camron Gordon \" Pt requesting something for GERD. No new orders at this time. Waiting on return call.

## 2018-11-20 PROCEDURE — C9113 INJ PANTOPRAZOLE SODIUM, VIA: HCPCS | Performed by: HOSPITALIST

## 2018-11-20 PROCEDURE — 84132 ASSAY OF SERUM POTASSIUM: CPT | Performed by: INTERNAL MEDICINE

## 2018-11-20 PROCEDURE — 85027 COMPLETE CBC AUTOMATED: CPT | Performed by: INTERNAL MEDICINE

## 2018-11-20 PROCEDURE — 80053 COMPREHEN METABOLIC PANEL: CPT | Performed by: INTERNAL MEDICINE

## 2018-11-20 PROCEDURE — 85378 FIBRIN DEGRADE SEMIQUANT: CPT | Performed by: INTERNAL MEDICINE

## 2018-11-20 RX ORDER — LABETALOL HYDROCHLORIDE 5 MG/ML
10 INJECTION, SOLUTION INTRAVENOUS EVERY 6 HOURS PRN
Status: DISCONTINUED | OUTPATIENT
Start: 2018-11-20 | End: 2018-11-21

## 2018-11-20 RX ORDER — METRONIDAZOLE 500 MG/100ML
500 INJECTION, SOLUTION INTRAVENOUS EVERY 8 HOURS
Status: DISCONTINUED | OUTPATIENT
Start: 2018-11-20 | End: 2018-11-21

## 2018-11-20 RX ORDER — CALCIUM CARBONATE 200(500)MG
500 TABLET,CHEWABLE ORAL 3 TIMES DAILY PRN
Status: DISCONTINUED | OUTPATIENT
Start: 2018-11-20 | End: 2018-11-21

## 2018-11-20 NOTE — PROGRESS NOTES
Page sent to [facility]; Dr. Jayla Guzmán:    Requesting tums for heart burn. Order:    TUMS 500 mg PO T. I.D for heartburn.

## 2018-11-20 NOTE — PLAN OF CARE
Assumed care of this pt at 1900; VSS at this time. Pt expresses he is in pain after ambulating in the halls. Morphine and tylenol given at this time. Tums ordered this shift; IVFs remain.      Education: acid reflux; tums; patient positioning to prevent

## 2018-11-20 NOTE — CONSULTS
[place] Patient Status:  Inpatient    10/6/1950 MRN [id number]   [facility] 3NW-A Attending Tao Ontiveros MD   Hosp Day # 0 PCP Navneet Guadalupe MD     Fran Hernandez is a 80year old male for epigastric abdomina otherwise, no F/C, no wt loss  SKIN: denies any unusual skin lesions  HEENT: denies jaundice   LUNGS: denies SOB   CV: denies chest pain GI: denies dysphagia, N/V  : denies hematuria    MSK: has no joint pain ENDOCR: denies diabetes or thyroid history  E

## 2018-11-20 NOTE — PROGRESS NOTES
[facility] Hospitalist Progress Note     PCP: Fran Sagastume MD    Chief Complaint: follow-up    Overnight/Interim Events:      SUBJECTIVE:  Pt laying in bed, no n/v/d/abd pain. Still feeling weak, uncomfortable.  Some distress c breathing but states that's d/t wea • metRONIDAZOLE  500 mg Intravenous Q8H   • pantoprazole (PROTONIX) IV push  40 mg Intravenous Q24H   • Heparin Sodium (Porcine)  5,000 Units Subcutaneous 2 times per day     • sodium chloride 100 mL/hr at 11/20/18 1018     Calcium Carbonate Antacid, L

## 2018-11-20 NOTE — PROGRESS NOTES
11/19/18 Pt ambulating independently in [place] at this time. Gait steady. A+Ox3. RA. Tolerating clear liquids. Denies nausea but c/o hiccups. Bowels hypoactive. CPOX and tele on. Voiding freely. Pain managed by Morphine IVP PRN. Use of I.S. Encouraged.  Pt al

## 2018-11-21 VITALS
BODY MASS INDEX: 30.01 KG/M2 | SYSTOLIC BLOOD PRESSURE: 161 MMHG | DIASTOLIC BLOOD PRESSURE: 61 MMHG | HEART RATE: 83 BPM | OXYGEN SATURATION: 98 % | WEIGHT: 198 LBS | RESPIRATION RATE: 18 BRPM | TEMPERATURE: 98 F | HEIGHT: 68 IN

## 2018-11-21 PROCEDURE — C9113 INJ PANTOPRAZOLE SODIUM, VIA: HCPCS | Performed by: HOSPITALIST

## 2018-11-21 PROCEDURE — 90471 IMMUNIZATION ADMIN: CPT

## 2018-11-21 RX ORDER — LABETALOL 200 MG/1
200 TABLET, FILM COATED ORAL 2 TIMES DAILY
Qty: 60 TABLET | Refills: 0 | Status: SHIPPED | OUTPATIENT
Start: 2018-11-21 | End: 2018-12-04 | Stop reason: ALTCHOICE

## 2018-11-21 RX ORDER — METRONIDAZOLE 500 MG/1
TABLET ORAL
Qty: 36 TABLET | Refills: 1 | Status: SHIPPED | OUTPATIENT
Start: 2018-11-21 | End: 2018-11-27

## 2018-11-21 RX ORDER — HYDROCODONE BITARTRATE AND ACETAMINOPHEN 5; 325 MG/1; MG/1
1 TABLET ORAL EVERY 6 HOURS PRN
Status: DISCONTINUED | OUTPATIENT
Start: 2018-11-21 | End: 2018-11-21

## 2018-11-21 NOTE — PROGRESS NOTES
[facility]  Progress Note    Fito Parker Patient Status:  Inpatient    10/6/1950 MRN [id number]   [facility] 3NW-A Attending Elder Dumont MD   Hosp Day # 1 PCP Manan Sheikh MD     Subjective:  Fito Parker is a(n) 80 year o

## 2018-11-21 NOTE — DISCHARGE SUMMARY
[facility] Internal Medicine Discharge Summary   Patient ID:  Vicky Contreras  [id number]  89 year old  10/6/1950    Admit date: 11/17/2018    Discharge date and time: 11/21/2018     Attending Physician: Jarred Brooks MD     Primary Care Physician: Colette Rodriguez Exam on day of discharge:  Walking to bathroom, feeling better. No cp/sob. No diarrhea. No abd pain. Eating, walked.      Gen: No acute distress, alert and oriented  CV: RRR, +s1/s2  Lungs: CTAB, good respiratory effort  Abdomen: s/nt/nd  Ext: Moves a CONCLUSION:  No acute findings.      Dictated by: Roberto Tovar MD on 11/17/2018 at 19:03     Approved by: Roberto Tovar MD            Ct Abdomen+pelvis(contrast Only)(cpt=74177)    Result Date: 11/17/2018  PROCEDURE:  CT ABDOMEN+PELVIS (CONTRAST within the pelvis. AORTA/VASCULAR:  Atheromatous changes of the aorta. BONES:  Degenerative changes. CONCLUSION:  1.   Mild wall thickening of the transverse portion of the colon which may be secondary to incomplete distention but mild colitis cannot

## 2018-11-24 ENCOUNTER — HOSPITAL ENCOUNTER (OUTPATIENT)
Facility: HOSPITAL | Age: 68
Setting detail: OBSERVATION
Discharge: HOME | End: 2018-11-27
Attending: EMERGENCY MEDICINE | Admitting: INTERNAL MEDICINE
Payer: COMMERCIAL

## 2018-11-24 DIAGNOSIS — R11.2 INTRACTABLE VOMITING WITH NAUSEA, UNSPECIFIED VOMITING TYPE: Primary | ICD-10-CM

## 2018-11-24 DIAGNOSIS — K52.9 ACUTE COLITIS: ICD-10-CM

## 2018-11-24 PROCEDURE — 99285 EMERGENCY DEPT VISIT HI MDM: CPT

## 2018-11-24 PROCEDURE — 85025 COMPLETE CBC W/AUTO DIFF WBC: CPT | Performed by: EMERGENCY MEDICINE

## 2018-11-24 PROCEDURE — 96361 HYDRATE IV INFUSION ADD-ON: CPT

## 2018-11-24 PROCEDURE — 96375 TX/PRO/DX INJ NEW DRUG ADDON: CPT

## 2018-11-24 PROCEDURE — 80053 COMPREHEN METABOLIC PANEL: CPT | Performed by: EMERGENCY MEDICINE

## 2018-11-24 PROCEDURE — 96374 THER/PROPH/DIAG INJ IV PUSH: CPT

## 2018-11-24 PROCEDURE — 96372 THER/PROPH/DIAG INJ SC/IM: CPT

## 2018-11-24 PROCEDURE — 83690 ASSAY OF LIPASE: CPT | Performed by: EMERGENCY MEDICINE

## 2018-11-24 PROCEDURE — 96376 TX/PRO/DX INJ SAME DRUG ADON: CPT

## 2018-11-24 RX ORDER — SODIUM CHLORIDE 9 MG/ML
INJECTION, SOLUTION INTRAVENOUS CONTINUOUS
Status: DISCONTINUED | OUTPATIENT
Start: 2018-11-24 | End: 2018-11-24

## 2018-11-24 RX ORDER — HYDRALAZINE HYDROCHLORIDE 20 MG/ML
10 INJECTION INTRAMUSCULAR; INTRAVENOUS ONCE
Status: DISCONTINUED | OUTPATIENT
Start: 2018-11-24 | End: 2018-11-24

## 2018-11-24 RX ORDER — HEPARIN SODIUM 5000 [USP'U]/ML
5000 INJECTION, SOLUTION INTRAVENOUS; SUBCUTANEOUS EVERY 8 HOURS SCHEDULED
Status: DISCONTINUED | OUTPATIENT
Start: 2018-11-24 | End: 2018-11-24

## 2018-11-24 RX ORDER — MORPHINE SULFATE 4 MG/ML
1 INJECTION, SOLUTION INTRAMUSCULAR; INTRAVENOUS EVERY 2 HOUR PRN
Status: DISCONTINUED | OUTPATIENT
Start: 2018-11-24 | End: 2018-11-27

## 2018-11-24 RX ORDER — HYDROCODONE BITARTRATE AND ACETAMINOPHEN 5; 325 MG/1; MG/1
1 TABLET ORAL EVERY 4 HOURS PRN
Status: DISCONTINUED | OUTPATIENT
Start: 2018-11-24 | End: 2018-11-24

## 2018-11-24 RX ORDER — HYDROCODONE BITARTRATE AND ACETAMINOPHEN 5; 325 MG/1; MG/1
1 TABLET ORAL EVERY 4 HOURS PRN
Status: DISCONTINUED | OUTPATIENT
Start: 2018-11-24 | End: 2018-11-27

## 2018-11-24 RX ORDER — ENOXAPARIN SODIUM 100 MG/ML
40 INJECTION SUBCUTANEOUS NIGHTLY
Status: DISCONTINUED | OUTPATIENT
Start: 2018-11-24 | End: 2018-11-27

## 2018-11-24 RX ORDER — LABETALOL 200 MG/1
200 TABLET, FILM COATED ORAL 2 TIMES DAILY
Status: DISCONTINUED | OUTPATIENT
Start: 2018-11-24 | End: 2018-11-27

## 2018-11-24 RX ORDER — HYDROCODONE BITARTRATE AND ACETAMINOPHEN 5; 325 MG/1; MG/1
2 TABLET ORAL EVERY 4 HOURS PRN
Status: DISCONTINUED | OUTPATIENT
Start: 2018-11-24 | End: 2018-11-27

## 2018-11-24 RX ORDER — ACETAMINOPHEN 325 MG/1
650 TABLET ORAL EVERY 4 HOURS PRN
Status: DISCONTINUED | OUTPATIENT
Start: 2018-11-24 | End: 2018-11-24

## 2018-11-24 RX ORDER — METOCLOPRAMIDE HYDROCHLORIDE 5 MG/ML
10 INJECTION INTRAMUSCULAR; INTRAVENOUS EVERY 8 HOURS PRN
Status: DISCONTINUED | OUTPATIENT
Start: 2018-11-24 | End: 2018-11-24

## 2018-11-24 RX ORDER — MORPHINE SULFATE 10 MG/ML
10 INJECTION, SOLUTION INTRAMUSCULAR; INTRAVENOUS EVERY 30 MIN PRN
Status: DISCONTINUED | OUTPATIENT
Start: 2018-11-24 | End: 2018-11-24

## 2018-11-24 RX ORDER — SODIUM CHLORIDE 9 MG/ML
INJECTION, SOLUTION INTRAVENOUS CONTINUOUS
Status: CANCELLED | OUTPATIENT
Start: 2018-11-24 | End: 2018-11-24

## 2018-11-24 RX ORDER — METRONIDAZOLE 500 MG/100ML
500 INJECTION, SOLUTION INTRAVENOUS EVERY 8 HOURS
Status: DISCONTINUED | OUTPATIENT
Start: 2018-11-24 | End: 2018-11-27

## 2018-11-24 RX ORDER — ONDANSETRON 2 MG/ML
4 INJECTION INTRAMUSCULAR; INTRAVENOUS ONCE
Status: COMPLETED | OUTPATIENT
Start: 2018-11-24 | End: 2018-11-24

## 2018-11-24 RX ORDER — METOCLOPRAMIDE HYDROCHLORIDE 5 MG/ML
10 INJECTION INTRAMUSCULAR; INTRAVENOUS EVERY 8 HOURS PRN
Status: DISCONTINUED | OUTPATIENT
Start: 2018-11-24 | End: 2018-11-27

## 2018-11-24 RX ORDER — SODIUM CHLORIDE 9 MG/ML
INJECTION, SOLUTION INTRAVENOUS CONTINUOUS
Status: DISCONTINUED | OUTPATIENT
Start: 2018-11-24 | End: 2018-11-27

## 2018-11-24 RX ORDER — HYDROCODONE BITARTRATE AND ACETAMINOPHEN 5; 325 MG/1; MG/1
2 TABLET ORAL EVERY 4 HOURS PRN
Status: DISCONTINUED | OUTPATIENT
Start: 2018-11-24 | End: 2018-11-24

## 2018-11-24 RX ORDER — MORPHINE SULFATE 4 MG/ML
4 INJECTION, SOLUTION INTRAMUSCULAR; INTRAVENOUS EVERY 2 HOUR PRN
Status: DISCONTINUED | OUTPATIENT
Start: 2018-11-24 | End: 2018-11-27

## 2018-11-24 RX ORDER — MORPHINE SULFATE 4 MG/ML
2 INJECTION, SOLUTION INTRAMUSCULAR; INTRAVENOUS EVERY 2 HOUR PRN
Status: DISCONTINUED | OUTPATIENT
Start: 2018-11-24 | End: 2018-11-27

## 2018-11-24 RX ORDER — PANTOPRAZOLE SODIUM 40 MG/1
40 TABLET, DELAYED RELEASE ORAL
Status: DISCONTINUED | OUTPATIENT
Start: 2018-11-24 | End: 2018-11-27

## 2018-11-24 RX ORDER — ONDANSETRON 2 MG/ML
4 INJECTION INTRAMUSCULAR; INTRAVENOUS EVERY 6 HOURS PRN
Status: DISCONTINUED | OUTPATIENT
Start: 2018-11-24 | End: 2018-11-24

## 2018-11-24 RX ORDER — ACETAMINOPHEN 325 MG/1
650 TABLET ORAL EVERY 4 HOURS PRN
Status: DISCONTINUED | OUTPATIENT
Start: 2018-11-24 | End: 2018-11-27

## 2018-11-24 RX ORDER — MORPHINE SULFATE 4 MG/ML
4 INJECTION, SOLUTION INTRAMUSCULAR; INTRAVENOUS EVERY 30 MIN PRN
Status: CANCELLED | OUTPATIENT
Start: 2018-11-24

## 2018-11-24 RX ORDER — HYDRALAZINE HYDROCHLORIDE 20 MG/ML
10 INJECTION INTRAMUSCULAR; INTRAVENOUS EVERY 4 HOURS PRN
Status: DISCONTINUED | OUTPATIENT
Start: 2018-11-24 | End: 2018-11-27

## 2018-11-24 RX ORDER — ONDANSETRON 2 MG/ML
4 INJECTION INTRAMUSCULAR; INTRAVENOUS EVERY 6 HOURS PRN
Status: DISCONTINUED | OUTPATIENT
Start: 2018-11-24 | End: 2018-11-27

## 2018-11-24 NOTE — ED NOTES
Pt resting peacefully after morphine administered. Pt B/P is elevated vs otherwise normal.  Will continue to monitor. Pt on second 0.9 NS 1 Liter bolus. No urine specimen at this time.

## 2018-11-24 NOTE — ED NOTES
Nurse to Nurse report called to [facility] . Pt continues to rest on cart. VSS. Pt transport ordered.

## 2018-11-24 NOTE — H&P
[facility] Hospitalist History and Physical      Patient presents with:  Nausea/Vomiting/Diarrhea (gastrointestinal)       PCP: Julian Cano MD      History of Present Illness: Patient is a 80year old male with PMH sig for HTN and recent admission for panniculiti OBJECTIVE:  BP (!) 193/80 (BP Location: Left arm)   Pulse 76   Temp 98.3 °F (36.8 °C) (Oral)   Resp 20   Ht 5' 8\" (1.727 m)   Wt 200 lb (90.7 kg)   SpO2 95%   BMI 30.41 kg/m²   General:  Alert, no distress, appears stated age.     Head:  Normocephalic, food poisoning from something he ate last night. FINDINGS:  Cardiac size and pulmonary vasculature are within normal limits. No pleural effusions. No pneumothorax. CONCLUSION:  No acute findings.      Dictated by: Burke Salmon MD on 11/17/2018 a small lymph nodes suggestive mesenteric panniculitis. PELVIS:  Bladder is unremarkable in appearance. Prostatomegaly measuring 5.5 x 4.7 cm. Calcified phleboliths within the pelvis. AORTA/VASCULAR:  Atheromatous changes of the aorta.  BONES:  Kelly President

## 2018-11-24 NOTE — ED PROVIDER NOTES
Patient Seen in: [facility] Emergency Department    History   Patient presents with:  Nausea/Vomiting/Diarrhea (gastrointestinal)    Stated Complaint: Abdominal Pain with n/v'/d    HPI    50-year-old male who was recently admitted for colitis.   He w Lungs: good air exchange  Heart: regular rate rhythm   Abdomen: Mild diffuse tenderness try. No abdominal masses.   No peritoneal signs   Extremities: no edema, normal peripheral pulses   Neuro: Alert oriented and nonfocal       ED Course     Labs Review MDM       Patient hydrated normal saline and given Zofran. He does not feel that he can manage at home as he lives alone and his girlfriend is working.       Case discussed with Dr. Toby Juarez, the [place] [id number] hospitalist.      Disposition

## 2018-11-25 PROCEDURE — 87493 C DIFF AMPLIFIED PROBE: CPT | Performed by: INTERNAL MEDICINE

## 2018-11-25 PROCEDURE — 96376 TX/PRO/DX INJ SAME DRUG ADON: CPT

## 2018-11-25 PROCEDURE — 96375 TX/PRO/DX INJ NEW DRUG ADDON: CPT

## 2018-11-25 PROCEDURE — 85025 COMPLETE CBC W/AUTO DIFF WBC: CPT | Performed by: INTERNAL MEDICINE

## 2018-11-25 PROCEDURE — 96372 THER/PROPH/DIAG INJ SC/IM: CPT

## 2018-11-25 PROCEDURE — 84132 ASSAY OF SERUM POTASSIUM: CPT | Performed by: INTERNAL MEDICINE

## 2018-11-25 PROCEDURE — 80053 COMPREHEN METABOLIC PANEL: CPT | Performed by: INTERNAL MEDICINE

## 2018-11-25 RX ORDER — POTASSIUM CHLORIDE 20 MEQ/1
40 TABLET, EXTENDED RELEASE ORAL ONCE
Status: COMPLETED | OUTPATIENT
Start: 2018-11-25 | End: 2018-11-25

## 2018-11-25 RX ORDER — POTASSIUM CHLORIDE 20 MEQ/1
40 TABLET, EXTENDED RELEASE ORAL EVERY 4 HOURS
Status: COMPLETED | OUTPATIENT
Start: 2018-11-25 | End: 2018-11-25

## 2018-11-25 RX ORDER — LOSARTAN POTASSIUM 25 MG/1
25 TABLET ORAL DAILY
Status: DISCONTINUED | OUTPATIENT
Start: 2018-11-25 | End: 2018-11-26

## 2018-11-25 NOTE — CONSULTS
[place] Patient Status:  Observation    10/6/1950 MRN [id number]   [facility] 5NW-A Attending Lus Bloch, DO   Hosp Day # 0 PCP MD Barrington Carr Baldomero is a 80year old male for diarrhea, epigastr any unusual skin lesions  HEENT: denies jaundice   LUNGS: denies SOB   CV: denies chest pain GI: denies dysphagia, N/V  : denies hematuria    MSK: has no joint pain  ENDOCR: denies diabetes or thyroid history  EXAM:   /76 (BP Location: Left arm)

## 2018-11-25 NOTE — PLAN OF CARE
NURSING ADMISSION NOTE      Patient admitted via Wheelchair  Oriented to room. Safety precautions initiated. Bed in low position. Call light in reach. IVF, IV ABX initiated. Patient rated pain 10/10 - PRN morphine administered.  No emesis so far,

## 2018-11-25 NOTE — PLAN OF CARE
GASTROINTESTINAL - ADULT    • Minimal or absence of nausea and vomiting Not Progressing          GASTROINTESTINAL - ADULT    • Maintains or returns to baseline bowel function Progressing          PROBLEM:N/V/D    ASSESSMENT:PT IS ALERT TIMES 4, ON RA, C/O

## 2018-11-25 NOTE — PROGRESS NOTES
[facility] Hospitalist Progress Note                                                                   [place]  10/6/1950    SUBJECTIVE: pt doing better. No further n/v or diarrhea.  He still morphINE sulfate, ondansetron HCl, Metoclopramide HCl, hydrALAzine HCl    Assessment/Plan:  Principal Problem:    Intractable vomiting with nausea, unspecified vomiting type  Active Problems:    Intractable vomiting with nausea    Acute colitis    #N/V and

## 2018-11-26 ENCOUNTER — APPOINTMENT (OUTPATIENT)
Dept: ULTRASOUND IMAGING | Facility: HOSPITAL | Age: 68
End: 2018-11-26
Attending: INTERNAL MEDICINE
Payer: COMMERCIAL

## 2018-11-26 PROCEDURE — 80076 HEPATIC FUNCTION PANEL: CPT | Performed by: INTERNAL MEDICINE

## 2018-11-26 PROCEDURE — 96367 TX/PROPH/DG ADDL SEQ IV INF: CPT

## 2018-11-26 PROCEDURE — 96372 THER/PROPH/DIAG INJ SC/IM: CPT

## 2018-11-26 PROCEDURE — 84132 ASSAY OF SERUM POTASSIUM: CPT | Performed by: INTERNAL MEDICINE

## 2018-11-26 PROCEDURE — 96361 HYDRATE IV INFUSION ADD-ON: CPT

## 2018-11-26 PROCEDURE — 96366 THER/PROPH/DIAG IV INF ADDON: CPT

## 2018-11-26 PROCEDURE — 96375 TX/PRO/DX INJ NEW DRUG ADDON: CPT

## 2018-11-26 PROCEDURE — 96365 THER/PROPH/DIAG IV INF INIT: CPT

## 2018-11-26 PROCEDURE — 76700 US EXAM ABDOM COMPLETE: CPT | Performed by: INTERNAL MEDICINE

## 2018-11-26 PROCEDURE — 96376 TX/PRO/DX INJ SAME DRUG ADON: CPT

## 2018-11-26 PROCEDURE — 83690 ASSAY OF LIPASE: CPT | Performed by: INTERNAL MEDICINE

## 2018-11-26 RX ORDER — LOSARTAN POTASSIUM 25 MG/1
25 TABLET ORAL ONCE
Status: COMPLETED | OUTPATIENT
Start: 2018-11-26 | End: 2018-11-26

## 2018-11-26 RX ORDER — LOSARTAN POTASSIUM 50 MG/1
50 TABLET ORAL DAILY
Status: DISCONTINUED | OUTPATIENT
Start: 2018-11-27 | End: 2018-11-27

## 2018-11-26 RX ORDER — POTASSIUM CHLORIDE 14.9 MG/ML
20 INJECTION INTRAVENOUS ONCE
Status: COMPLETED | OUTPATIENT
Start: 2018-11-26 | End: 2018-11-26

## 2018-11-26 NOTE — CONSULTS
[facility]  Report of Consultation    Comfort Hodge Patient Status:  Observation    10/6/1950 MRN [id number]   [facility] 5NW-A Attending Kimani Corrales,    Hosp Day # 0 PCP Ciera , MD     Reason for Consultation:    Surgical smokeless tobacco. He reports that he does not drink alcohol or use drugs.     Allergies:      Codeine                 NAUSEA AND VOMITING    Current Medications:      Current Facility-Administered Medications:   •  [START ON 11/27/2018] Losartan Potassium Pantoprazole Sodium 40 MG Oral Tab EC Take 40 mg by mouth 2 (two) times daily before meals. Disp:  Rfl:        Review of Systems:    10 point review performed pertinent positives and negatives per history of present illness.     Physical Exam:    Blood has had NVD since last night. He feels he might have food poisoning from something he ate last night. FINDINGS:  Cardiac size and pulmonary vasculature are within normal limits. No pleural effusions. No pneumothorax. CONCLUSION:  No acute findings. appearance. Minimal stranding within the mid mesenteric with small lymph nodes suggestive mesenteric panniculitis. PELVIS:  Bladder is unremarkable in appearance. Prostatomegaly measuring 5.5 x 4.7 cm. Calcified phleboliths within the pelvis.    AORTA/VA Surgery  11/26/2018    Review of the ultrasound reveals no cholelithiasis. Patient did have a noted stable calcium deposit within the wall of the gallbladder on CT scan.   Patient does have findings to suggest mesenteric panniculitis on CT scan however thi

## 2018-11-26 NOTE — PROGRESS NOTES
[facility]  Progress Note    Mecca Hogan Patient Status:  Observation    10/6/1950 MRN [id number]   [facility] 5NW-A Attending Moon Dimas DO   Hosp Day # 0 PCP Elsy Armstrong MD     Subjective:  No further abdominal pain, nausea of right kidney due to cancer         Objective:  Blood pressure 151/77, pulse 82, temperature 98.4 °F (36.9 °C), temperature source Oral, resp. rate 20, height 5' 8\" (1.727 m), weight 197 lb 8 oz (89.6 kg), SpO2 96 %.       EXAM:  /77 (BP Location:

## 2018-11-26 NOTE — PROGRESS NOTES
[facility] Hospitalist Progress Note                                                                   [place]  10/6/1950    SUBJECTIVE: pt still not eating much, ate dinner last night and h HYDROcodone-acetaminophen, morphINE sulfate **OR** morphINE sulfate **OR** morphINE sulfate, ondansetron HCl, Metoclopramide HCl, hydrALAzine HCl    Assessment/Plan:  Principal Problem:    Intractable vomiting with nausea, unspecified vomiting type  Active

## 2018-11-27 VITALS
RESPIRATION RATE: 18 BRPM | DIASTOLIC BLOOD PRESSURE: 86 MMHG | SYSTOLIC BLOOD PRESSURE: 158 MMHG | HEIGHT: 68 IN | WEIGHT: 195.38 LBS | TEMPERATURE: 99 F | BODY MASS INDEX: 29.61 KG/M2 | OXYGEN SATURATION: 95 % | HEART RATE: 70 BPM

## 2018-11-27 PROCEDURE — 96361 HYDRATE IV INFUSION ADD-ON: CPT

## 2018-11-27 PROCEDURE — 96366 THER/PROPH/DIAG IV INF ADDON: CPT

## 2018-11-27 PROCEDURE — 84132 ASSAY OF SERUM POTASSIUM: CPT | Performed by: INTERNAL MEDICINE

## 2018-11-27 PROCEDURE — 96376 TX/PRO/DX INJ SAME DRUG ADON: CPT

## 2018-11-27 RX ORDER — LOSARTAN POTASSIUM 50 MG/1
50 TABLET ORAL DAILY
Qty: 30 TABLET | Refills: 0 | Status: SHIPPED | OUTPATIENT
Start: 2018-11-28 | End: 2018-12-04 | Stop reason: ALTCHOICE

## 2018-11-27 RX ORDER — POTASSIUM CHLORIDE 20 MEQ/1
40 TABLET, EXTENDED RELEASE ORAL ONCE
Status: DISCONTINUED | OUTPATIENT
Start: 2018-11-27 | End: 2018-11-27

## 2018-11-27 NOTE — CM/SW NOTE
11/27/18 1600   CM/SW Screening   Referral Source    Information Source Chart review;Nursing rounds   Patient's Mental Status Alert;Oriented   Patient's [address]   Patient lives with (family)   Patient Status Prior to Admission

## 2018-11-27 NOTE — PROGRESS NOTES
[facility]  Progress Note    Kennedi Duran Patient Status:  Observation    10/6/1950 MRN [id number]   [facility] 5NW-A Attending Shiv Gallegos, DO   Hosp Day # 0 PCP Shabnam Ochoa MD     Subjective:    Patient underwent US yesterday

## 2018-11-27 NOTE — PROGRESS NOTES
11/27/18    Kandis Ku      To Whom It May Concern:     This letter has been written at the patient's request. The above patient was seen at [facility] for treatment of a medical condition from 11/24/18 - 11/27/18    The patient may return to work/

## 2018-11-28 NOTE — PROGRESS NOTES
[facility]  Progress Note    Adela Stevenson Patient Status:  Observation    10/6/1950 MRN [id number]   [facility] 5NW-A Attending Teresa Braden,    Hosp Day # 0 PCP Nasir Becerra MD     Subjective:  No further abdominal pain      Cu °F (37.1 °C), temperature source Oral, resp. rate 18, height 5' 8\" (1.727 m), weight 195 lb 6.4 oz (88.6 kg), SpO2 95 %.       EXAM:  /86 (BP Location: Left arm)   Pulse 70   Temp 98.7 °F (37.1 °C) (Oral)   Resp 18   Ht 5' 8\" (1.727 m)   Wt 195 lb 6

## 2018-11-28 NOTE — DISCHARGE SUMMARY
General Medicine Discharge Summary     Patient ID:  Anupama Vargas  80year old  10/6/1950    Admit date: 11/24/2018    Discharge date and time: 11/27/2018  6:55 PM     Attending Physician: Galina Cristobal 11/27/2018  6:56 PM    START taking these medications    Losartan Potassium 50 MG Oral Tab  Take 1 tablet (50 mg total) by mouth daily. , Normal, Disp-30 tablet, R-0      CONTINUE these medications which have NOT CHANGED    metRONIDAZOLE 500 MG Oral Tab  1

## 2018-11-28 NOTE — PROGRESS NOTES
NURSING DISCHARGE NOTE    Discharged Home via Ambulatory. Belongings Taken by patient/family. Pt discharged via taxi. Pt discharged with personal belongings. IV d/c'd. Prescriptions given to patient.  Discharge instructions and follow-up care given

## 2019-06-12 ENCOUNTER — APPOINTMENT (OUTPATIENT)
Dept: CT IMAGING | Facility: HOSPITAL | Age: 69
DRG: 392 | End: 2019-06-12
Attending: EMERGENCY MEDICINE
Payer: COMMERCIAL

## 2019-06-12 ENCOUNTER — HOSPITAL ENCOUNTER (INPATIENT)
Facility: HOSPITAL | Age: 69
LOS: 5 days | Discharge: HOME | DRG: 392 | End: 2019-06-17
Attending: EMERGENCY MEDICINE | Admitting: INTERNAL MEDICINE
Payer: COMMERCIAL

## 2019-06-12 DIAGNOSIS — K52.9 COLITIS: Primary | ICD-10-CM

## 2019-06-12 PROCEDURE — 83690 ASSAY OF LIPASE: CPT | Performed by: EMERGENCY MEDICINE

## 2019-06-12 PROCEDURE — 85025 COMPLETE CBC W/AUTO DIFF WBC: CPT | Performed by: EMERGENCY MEDICINE

## 2019-06-12 PROCEDURE — 80053 COMPREHEN METABOLIC PANEL: CPT | Performed by: EMERGENCY MEDICINE

## 2019-06-12 PROCEDURE — 93010 ELECTROCARDIOGRAM REPORT: CPT

## 2019-06-12 PROCEDURE — 96365 THER/PROPH/DIAG IV INF INIT: CPT

## 2019-06-12 PROCEDURE — 99285 EMERGENCY DEPT VISIT HI MDM: CPT

## 2019-06-12 PROCEDURE — 84484 ASSAY OF TROPONIN QUANT: CPT | Performed by: EMERGENCY MEDICINE

## 2019-06-12 PROCEDURE — 82962 GLUCOSE BLOOD TEST: CPT

## 2019-06-12 PROCEDURE — 96376 TX/PRO/DX INJ SAME DRUG ADON: CPT

## 2019-06-12 PROCEDURE — 81003 URINALYSIS AUTO W/O SCOPE: CPT | Performed by: EMERGENCY MEDICINE

## 2019-06-12 PROCEDURE — 74177 CT ABD & PELVIS W/CONTRAST: CPT | Performed by: EMERGENCY MEDICINE

## 2019-06-12 PROCEDURE — 96375 TX/PRO/DX INJ NEW DRUG ADDON: CPT

## 2019-06-12 PROCEDURE — 93005 ELECTROCARDIOGRAM TRACING: CPT

## 2019-06-12 PROCEDURE — 96361 HYDRATE IV INFUSION ADD-ON: CPT

## 2019-06-12 RX ORDER — ONDANSETRON 2 MG/ML
4 INJECTION INTRAMUSCULAR; INTRAVENOUS ONCE
Status: COMPLETED | OUTPATIENT
Start: 2019-06-12 | End: 2019-06-12

## 2019-06-12 RX ORDER — HEPARIN SODIUM 5000 [USP'U]/ML
5000 INJECTION, SOLUTION INTRAVENOUS; SUBCUTANEOUS EVERY 8 HOURS SCHEDULED
Status: DISCONTINUED | OUTPATIENT
Start: 2019-06-12 | End: 2019-06-17

## 2019-06-12 RX ORDER — ONDANSETRON 2 MG/ML
4 INJECTION INTRAMUSCULAR; INTRAVENOUS EVERY 6 HOURS PRN
Status: DISCONTINUED | OUTPATIENT
Start: 2019-06-12 | End: 2019-06-17

## 2019-06-12 RX ORDER — CIPROFLOXACIN 2 MG/ML
400 INJECTION, SOLUTION INTRAVENOUS ONCE
Status: COMPLETED | OUTPATIENT
Start: 2019-06-12 | End: 2019-06-12

## 2019-06-12 RX ORDER — METOCLOPRAMIDE HYDROCHLORIDE 5 MG/ML
10 INJECTION INTRAMUSCULAR; INTRAVENOUS EVERY 8 HOURS PRN
Status: DISCONTINUED | OUTPATIENT
Start: 2019-06-12 | End: 2019-06-17

## 2019-06-12 RX ORDER — MORPHINE SULFATE 4 MG/ML
1 INJECTION, SOLUTION INTRAMUSCULAR; INTRAVENOUS EVERY 2 HOUR PRN
Status: DISCONTINUED | OUTPATIENT
Start: 2019-06-12 | End: 2019-06-16

## 2019-06-12 RX ORDER — MORPHINE SULFATE 4 MG/ML
2 INJECTION, SOLUTION INTRAMUSCULAR; INTRAVENOUS EVERY 2 HOUR PRN
Status: DISCONTINUED | OUTPATIENT
Start: 2019-06-12 | End: 2019-06-16

## 2019-06-12 RX ORDER — SODIUM CHLORIDE 9 MG/ML
INJECTION, SOLUTION INTRAVENOUS CONTINUOUS
Status: DISCONTINUED | OUTPATIENT
Start: 2019-06-12 | End: 2019-06-14

## 2019-06-12 RX ORDER — CIPROFLOXACIN 2 MG/ML
400 INJECTION, SOLUTION INTRAVENOUS EVERY 12 HOURS
Status: DISCONTINUED | OUTPATIENT
Start: 2019-06-13 | End: 2019-06-16

## 2019-06-12 RX ORDER — SODIUM CHLORIDE 9 MG/ML
INJECTION, SOLUTION INTRAVENOUS CONTINUOUS
Status: ACTIVE | OUTPATIENT
Start: 2019-06-12 | End: 2019-06-13

## 2019-06-12 RX ORDER — ONDANSETRON 2 MG/ML
INJECTION INTRAMUSCULAR; INTRAVENOUS
Status: COMPLETED
Start: 2019-06-12 | End: 2019-06-12

## 2019-06-12 RX ORDER — MORPHINE SULFATE 4 MG/ML
4 INJECTION, SOLUTION INTRAMUSCULAR; INTRAVENOUS EVERY 2 HOUR PRN
Status: DISCONTINUED | OUTPATIENT
Start: 2019-06-12 | End: 2019-06-16

## 2019-06-12 RX ORDER — METRONIDAZOLE 500 MG/100ML
500 INJECTION, SOLUTION INTRAVENOUS ONCE
Status: COMPLETED | OUTPATIENT
Start: 2019-06-12 | End: 2019-06-12

## 2019-06-12 RX ORDER — ACETAMINOPHEN 325 MG/1
650 TABLET ORAL EVERY 6 HOURS PRN
Status: DISCONTINUED | OUTPATIENT
Start: 2019-06-12 | End: 2019-06-16

## 2019-06-12 RX ORDER — METRONIDAZOLE 500 MG/100ML
500 INJECTION, SOLUTION INTRAVENOUS EVERY 8 HOURS
Status: DISCONTINUED | OUTPATIENT
Start: 2019-06-13 | End: 2019-06-16

## 2019-06-12 RX ORDER — MORPHINE SULFATE 4 MG/ML
4 INJECTION, SOLUTION INTRAMUSCULAR; INTRAVENOUS EVERY 30 MIN PRN
Status: COMPLETED | OUTPATIENT
Start: 2019-06-12 | End: 2019-06-12

## 2019-06-12 NOTE — ED PROVIDER NOTES
Patient Seen in: [facility] Emergency Department    History   Patient presents with:  Abdomen/Flank Pain (GI/)    Stated Complaint:     HPI    Patient is a 50-year-old male with a history of diverticulitis, renal cancer, high blood pressure, prese symmetric motor strength and sensation proximally and distally throughout all 4 extremities. HEENT: No lymphadenopathy. Oropharynx nml. Mucus membranes moist.   Supple neck without any meningismus or rigidity.    Cardiovascular: Regular rhythm without mur created for panel order CBC WITH DIFFERENTIAL WITH PLATELET.   Procedure                               Abnormality         Status                     ---------                               -----------         ------                     CBC W/ DIFFERENTIAL[ bilateral lower abdominal pain with vomiting since this am.          CONTRAST USED:  80cc of Omnipaque 350         FINDINGS:           Fatty liver. No liver lesion. Normal caliber gallbladder. No biliary dilation.   No portal vein throm diagnosis)    Disposition:  Admit  6/12/2019  9:01 pm    Follow-up:  No follow-up provider specified.       Medications Prescribed:  Current Discharge Medication List        Present on Admission  Date Reviewed: 12/4/2018          ICD-10-CM Noted POA    Coli

## 2019-06-13 PROCEDURE — 80048 BASIC METABOLIC PNL TOTAL CA: CPT | Performed by: INTERNAL MEDICINE

## 2019-06-13 PROCEDURE — C9113 INJ PANTOPRAZOLE SODIUM, VIA: HCPCS | Performed by: INTERNAL MEDICINE

## 2019-06-13 PROCEDURE — 85025 COMPLETE CBC W/AUTO DIFF WBC: CPT | Performed by: INTERNAL MEDICINE

## 2019-06-13 RX ORDER — PROCHLORPERAZINE EDISYLATE 5 MG/ML
10 INJECTION INTRAMUSCULAR; INTRAVENOUS EVERY 6 HOURS PRN
Status: DISCONTINUED | OUTPATIENT
Start: 2019-06-13 | End: 2019-06-17

## 2019-06-13 RX ORDER — LORAZEPAM 2 MG/ML
0.5 INJECTION INTRAMUSCULAR EVERY 6 HOURS PRN
Status: DISCONTINUED | OUTPATIENT
Start: 2019-06-13 | End: 2019-06-17

## 2019-06-13 NOTE — PROGRESS NOTES
Pt with persistent abdominal pain to mid region along with nausea and vomiting. Pt has had two emesis since admission to floor, last emesis reddish brown in color. Pt medicated as per [facility], continues on IVF and antibiotics, will continue to monitor.

## 2019-06-13 NOTE — CONSULTS
GASTROENTEROLOGY CONSULTATION  Carolynn Montes MD    Department of Gastroenterology  [facility]    Reena Hutchins Patient Status:  Inpatient    10/6/1950 MRN [id number]   [facility] 4NW-A Attending Tabyb Long MD   [address] Day # 1 Technologist)  Patient has had bilateral lower abdominal pain with vomiting since this am.      CONTRAST USED:  80cc of Omnipaque 350     FINDINGS:       Fatty liver. No liver lesion. Normal caliber gallbladder. No biliary dilation.   No portal Edisylate (COMPAZINE) injection 10 mg, 10 mg, Intravenous, Q6H PRN  •  Pantoprazole Sodium (PROTONIX) 40 mg in Sodium Chloride 0.9 % 10 mL IV push, 40 mg, Intravenous, Q24H  •  LORazepam (ATIVAN) injection 0.5 mg, 0.5 mg, Intravenous, Q6H PRN  •  [COMPLETE enlarging or painful lymph nodes. Allergy: Denies medication allergy, latex/rubber allergy, anaphylactic or other reaction to anesthesia, food allergy. Eyes: Denies blurred/double vision, eye disease, glasses or contacts, glaucoma.   ENT: Denies nose or tolerated. Thank you for allowing to participate in the care of this patient.     Pura Vincent  6/13/2019  7:51 AM

## 2019-06-13 NOTE — H&P
[facility] Hospitalist History and Physical      CC: N/V abd pain diarrhea    PCP: Shabnam Ochoa MD    History of Present Illness: Patient is a 80year old male with PMH sig for HTN here with new onset abd pain, N/V and diarrhea that started last evening.  He ate a 97.5 °F (36.4 °C) (Oral)   Resp 18   Ht 175.3 cm (5' 9\")   Wt 188 lb 12.8 oz (85.6 kg)   SpO2 95%   BMI 27.88 kg/m²     Gen: No acute distress  Eyes: Pink conjunctivae, No ptosis  Neck: No masses, trachae midline.  No thyromegaly  Pulm: Lungs clear bilater

## 2019-06-13 NOTE — DIETARY NOTE
Hiro     Admitting diagnosis:  Colitis [K52.9]    Ht: 175.3 cm (5' 9\")  Wt: 85.6 kg (188 lb 12.8 oz). This is 117% of IBW  Body mass index is 27.88 kg/m².   IBW: 72.7 kg    Wt Readings  06/12/19 : 85.6 kg (

## 2019-06-13 NOTE — PROGRESS NOTES
NURSING ADMISSION NOTE      Patient admitted via Cart   Oriented to room. Safety precautions initiated. Bed in low position. Call light in reach.

## 2019-06-14 PROCEDURE — 80053 COMPREHEN METABOLIC PANEL: CPT | Performed by: INTERNAL MEDICINE

## 2019-06-14 PROCEDURE — 85025 COMPLETE CBC W/AUTO DIFF WBC: CPT | Performed by: INTERNAL MEDICINE

## 2019-06-14 PROCEDURE — C9113 INJ PANTOPRAZOLE SODIUM, VIA: HCPCS | Performed by: INTERNAL MEDICINE

## 2019-06-14 RX ORDER — SODIUM CHLORIDE 9 MG/ML
INJECTION, SOLUTION INTRAVENOUS CONTINUOUS
Status: DISCONTINUED | OUTPATIENT
Start: 2019-06-14 | End: 2019-06-17

## 2019-06-14 RX ORDER — AMLODIPINE BESYLATE 5 MG/1
5 TABLET ORAL DAILY
Status: DISCONTINUED | OUTPATIENT
Start: 2019-06-14 | End: 2019-06-16

## 2019-06-14 RX ORDER — AMLODIPINE BESYLATE 5 MG/1
10 TABLET ORAL DAILY
Status: DISCONTINUED | OUTPATIENT
Start: 2019-06-14 | End: 2019-06-14

## 2019-06-14 NOTE — PROGRESS NOTES
[facility] Hospitalist Progress Note                                                                   [place]  10/6/1950    CC: abd pain    Interval History:  - Did very well yesterday afte 06/14/19  1132   * 136* 157*   BUN 14 12 11   CREATSERUM 1.14 1.15 1.14   GFRAA 77 76 77   GFRNAA 66 65 66   CA 9.8 9.3 9.2    141 139   K 4.1 4.2 3.7    110 106   CO2 22.0 25.0 26.0           ROS: no change to ROS from my documentation

## 2019-06-14 NOTE — PROGRESS NOTES
Alert and oriented x 4,v.s.s., denies abdominal pain or nausea and vomiting this shift. Isolation precautions to rule out c-diff, no stools as yet. Continues on antibiotics, ambulatory in room independently, no signs of distress, continue to monitor.

## 2019-06-14 NOTE — PLAN OF CARE
Pt c/o 8/10 abdominal pain Q2 hrs receiving IV morphine vitals stable afebrile at this time.  No BM Dr Laurel Duane notified labs ordered  will continue to monitor

## 2019-06-14 NOTE — PROGRESS NOTES
[facility]  Progress Note    Kanu Arzate Patient Status:  Inpatient    10/6/1950 MRN [id number]   [facility] 4NW-A Attending Marlin Dhillon MD   Hosp Day # 2 PCP Marcy Pineda MD     Subjective:  Had nausea, vomiting and lower abdom 98.4 °F (36.9 °C), temperature source Oral, resp. rate 18, height 5' 9\" (1.753 m), weight 188 lb 12.8 oz (85.6 kg), SpO2 98 %.       EXAM:  /49   Pulse 99   Temp 98.4 °F (36.9 °C) (Oral)   Resp 18   Ht 5' 9\" (1.753 m)   Wt 188 lb 12.8 oz (85.6 kg)

## 2019-06-14 NOTE — PROGRESS NOTES
No events until now. Pt had uneventful nite until 0630 when had nasuea and abdominal pain. 2 hours prior had been givn tylenol for headache.  zofran and morphine administered. Will reassess.

## 2019-06-15 ENCOUNTER — ANESTHESIA EVENT (OUTPATIENT)
Dept: ENDOSCOPY | Facility: HOSPITAL | Age: 69
End: 2019-06-15

## 2019-06-15 PROCEDURE — 87427 SHIGA-LIKE TOXIN AG IA: CPT | Performed by: EMERGENCY MEDICINE

## 2019-06-15 PROCEDURE — 87045 FECES CULTURE AEROBIC BACT: CPT | Performed by: EMERGENCY MEDICINE

## 2019-06-15 PROCEDURE — 87046 STOOL CULTR AEROBIC BACT EA: CPT | Performed by: EMERGENCY MEDICINE

## 2019-06-15 PROCEDURE — 87077 CULTURE AEROBIC IDENTIFY: CPT | Performed by: EMERGENCY MEDICINE

## 2019-06-15 PROCEDURE — 82272 OCCULT BLD FECES 1-3 TESTS: CPT | Performed by: EMERGENCY MEDICINE

## 2019-06-15 PROCEDURE — C9113 INJ PANTOPRAZOLE SODIUM, VIA: HCPCS | Performed by: INTERNAL MEDICINE

## 2019-06-15 RX ORDER — POTASSIUM CHLORIDE 14.9 MG/ML
20 INJECTION INTRAVENOUS ONCE
Status: COMPLETED | OUTPATIENT
Start: 2019-06-15 | End: 2019-06-15

## 2019-06-15 NOTE — PLAN OF CARE
Scheduled for EGD,colonoscopy in am w/ Dr Betina Guthrie. Patient aware & given instructions for NPO after midnight. On clear liquids at this time, bowel prep/golytely started,tolerated well. Has been having small stools since prep started. We'll continue to monitor.

## 2019-06-15 NOTE — PROGRESS NOTES
Alert and oriented x 4, v.s.s., resting quietly without c/o pain, nausea or vomiting this shift. Continues on antibiotics,still on full liquid diet, will encourage to advance for breakfast. No signs of distress,will continue to monitor.

## 2019-06-15 NOTE — PLAN OF CARE
Feels better after dose of pain med given earlier this shift. Pain is now down to 3/10 from 7/10. No SOB,would like to try soft diet. advanced diet to soft,will monitor if tolerating. Compazine given also earlier today for c/o nausea,with relief noted. IVF well

## 2019-06-15 NOTE — PROGRESS NOTES
[facility] Hospitalist Progress Note                                                                   [place]  10/6/1950    CC: abd pain    Interval History:  - Sitting in bed,denies f/c/pa 06/12/19  1629 06/13/19  0552 06/14/19  1132   * 136* 157*   BUN 14 12 11   CREATSERUM 1.14 1.15 1.14   GFRAA 77 76 77   GFRNAA 66 65 66   CA 9.8 9.3 9.2    141 139   K 4.1 4.2 3.7    110 106   CO2 22.0 25.0 26.0           ROS: no change

## 2019-06-15 NOTE — ANESTHESIA PREPROCEDURE EVALUATION
PRE-OP EVALUATION    Patient Name: Brian Whitehead    Pre-op Diagnosis: Colitis [K52.9]    Procedure(s):  ESOPHAGOGASTRODUODENOSCOPY (EGD) / COLONOSCOPY    Surgeon(s) and Role:     * Meghna Marques MD - Primary    Pre-op vitals reviewed.   Temp: 98.2 °F ([facility]) injection 4 mg 4 mg Intravenous Q6H PRN   Metoclopramide HCl (REGLAN) injection 10 mg 10 mg Intravenous Q8H PRN   Ciprofloxacin in D5W (CIPRO) IVPB premix SOLN 400 mg 400 mg Intravenous Q12H   metRONIDAZOLE in NaCl (FLAGYL) 5 mg/ml IVPB premix 500 6 cm   Cardiovascular    Cardiovascular exam normal.         Dental  Comment: Multiple repaired teeth           Pulmonary    Pulmonary exam normal.                 Other findings            ASA: 3   Plan: general and MAC  NPO status verified and         Co

## 2019-06-16 ENCOUNTER — ANESTHESIA (OUTPATIENT)
Dept: ENDOSCOPY | Facility: HOSPITAL | Age: 69
End: 2019-06-16

## 2019-06-16 PROCEDURE — 0DBE8ZX EXCISION OF LARGE INTESTINE, VIA NATURAL OR ARTIFICIAL OPENING ENDOSCOPIC, DIAGNOSTIC: ICD-10-PCS | Performed by: INTERNAL MEDICINE

## 2019-06-16 PROCEDURE — 0DJ08ZZ INSPECTION OF UPPER INTESTINAL TRACT, VIA NATURAL OR ARTIFICIAL OPENING ENDOSCOPIC: ICD-10-PCS | Performed by: INTERNAL MEDICINE

## 2019-06-16 PROCEDURE — 80048 BASIC METABOLIC PNL TOTAL CA: CPT | Performed by: INTERNAL MEDICINE

## 2019-06-16 PROCEDURE — 85025 COMPLETE CBC W/AUTO DIFF WBC: CPT | Performed by: INTERNAL MEDICINE

## 2019-06-16 PROCEDURE — 0DBH8ZZ EXCISION OF CECUM, VIA NATURAL OR ARTIFICIAL OPENING ENDOSCOPIC: ICD-10-PCS | Performed by: INTERNAL MEDICINE

## 2019-06-16 PROCEDURE — 88305 TISSUE EXAM BY PATHOLOGIST: CPT | Performed by: INTERNAL MEDICINE

## 2019-06-16 PROCEDURE — C9113 INJ PANTOPRAZOLE SODIUM, VIA: HCPCS | Performed by: INTERNAL MEDICINE

## 2019-06-16 RX ORDER — HYDROCODONE BITARTRATE AND ACETAMINOPHEN 5; 325 MG/1; MG/1
1 TABLET ORAL EVERY 4 HOURS PRN
Status: DISCONTINUED | OUTPATIENT
Start: 2019-06-16 | End: 2019-06-17

## 2019-06-16 RX ORDER — DICYCLOMINE HCL 20 MG
20 TABLET ORAL 3 TIMES DAILY PRN
Status: DISCONTINUED | OUTPATIENT
Start: 2019-06-16 | End: 2019-06-17

## 2019-06-16 RX ORDER — NALOXONE HYDROCHLORIDE 0.4 MG/ML
80 INJECTION, SOLUTION INTRAMUSCULAR; INTRAVENOUS; SUBCUTANEOUS AS NEEDED
Status: ACTIVE | OUTPATIENT
Start: 2019-06-16 | End: 2019-06-16

## 2019-06-16 RX ORDER — AMLODIPINE BESYLATE 5 MG/1
10 TABLET ORAL DAILY
Status: DISCONTINUED | OUTPATIENT
Start: 2019-06-17 | End: 2019-06-17

## 2019-06-16 RX ORDER — SODIUM CHLORIDE, SODIUM LACTATE, POTASSIUM CHLORIDE, CALCIUM CHLORIDE 600; 310; 30; 20 MG/100ML; MG/100ML; MG/100ML; MG/100ML
INJECTION, SOLUTION INTRAVENOUS CONTINUOUS
Status: DISCONTINUED | OUTPATIENT
Start: 2019-06-16 | End: 2019-06-16

## 2019-06-16 RX ORDER — ACETAMINOPHEN 325 MG/1
650 TABLET ORAL EVERY 6 HOURS PRN
Status: DISCONTINUED | OUTPATIENT
Start: 2019-06-16 | End: 2019-06-17

## 2019-06-16 NOTE — OPERATIVE REPORT
PATIENT NAME: Terrial Head  MRN: [id number]  DATE OF OPERATION: 6/16/19  REFERRING PHYSICIAN: Dr. Aruna Escalante  Medications:  MAC  DATE OF LAST COLONOSCOPY:  none  PREOPERATIVE DIAGNOSIS:   Lower abdominal pain   Nausea and vomiting   Abdominal pain  POSTOPER score was 2. (1 - excellent > 95% mucosa seen; 2 - good - clear liquid up to 25% of the mucosa, 90% mucosa seen;  3 - fair - semisolid stool not suctioned, but 90% of the mucosa seen; 4 - poor - semisolid stool not suctioned, but < 90% mucosa seen; 5 - ceci

## 2019-06-16 NOTE — BRIEF OP NOTE
Pre-Operative Diagnosis: Colitis [K52.9] Nausea, Vomiting, Abdminal Pain, abnormal CT     Post-Operative Diagnosis: EGD: Normal; Colon: Diverticulosis, polyp      Procedure Performed:   Procedure(s):  ESOPHAGOGASTRODUODENOSCOPY (EGD)  COLONOSCOPY with cold

## 2019-06-16 NOTE — ANESTHESIA POSTPROCEDURE EVALUATION
[place] Patient Status:  Inpatient   Age/Gender 80year old male MRN [id number]   Location [facility]. Attending Nan Bolden MD   Hosp Day # 4 PCP Roly Argueta MD       Anesthesia Post-op Note    Procedure(s):

## 2019-06-16 NOTE — PROGRESS NOTES
PATIENT NAME: Martha Haley  MRN: [id number]  DATE OF OPERATION: 6/16/19  REFERRING PHYSICIAN: Dr. Kailey Voss  Medications:  [facility]  DATE OF LAST COLONOSCOPY:  none  PREOPERATIVE DIAGNOSIS:   Lower abdominal pain   Nausea and vomiting   Abdominal pain  POSTOPER score was 2. (1 - excellent > 95% mucosa seen; 2 - good - clear liquid up to 25% of the mucosa, 90% mucosa seen;  3 - fair - semisolid stool not suctioned, but 90% of the mucosa seen; 4 - poor - semisolid stool not suctioned, but < 90% mucosa seen; 5 - ceci

## 2019-06-16 NOTE — PROGRESS NOTES
[facility] Hospitalist Progress Note                                                                   [place]  10/6/1950    CC: abd pain    Interval History:  - Laying in bed, pain post pro 25.0 26.0 23.0           ROS: no change to ROS from my documentation yesterday, except as otherwise noted in the Interval History above.     Assessment/Plan:    81 yo M with HTN here with new onset N/V abd pain and diarrhea since last night     # N/V, diarr

## 2019-06-16 NOTE — PROGRESS NOTES
Patient states that his pain is back and that he needed more pain meds. States that pain mds helped a little. Paged Dr Anshu Deleon and Dr Jus Vazquez for condition report.

## 2019-06-16 NOTE — PROGRESS NOTES
Patient c/o nausea and abdomen pain states that its the same pain that he had when he came in. Encouraged patient to walk to help pass the gas he did get up and took a small walk.

## 2019-06-17 VITALS
WEIGHT: 188.81 LBS | RESPIRATION RATE: 18 BRPM | OXYGEN SATURATION: 99 % | HEART RATE: 98 BPM | SYSTOLIC BLOOD PRESSURE: 158 MMHG | BODY MASS INDEX: 27.96 KG/M2 | TEMPERATURE: 98 F | DIASTOLIC BLOOD PRESSURE: 75 MMHG | HEIGHT: 69 IN

## 2019-06-17 PROCEDURE — C9113 INJ PANTOPRAZOLE SODIUM, VIA: HCPCS | Performed by: INTERNAL MEDICINE

## 2019-06-17 NOTE — DISCHARGE SUMMARY
[facility] Internal Medicine Discharge Summary   Patient ID:  Kennedi Duran  [id number]  29 year old  10/6/1950    Admit date: 6/12/2019    Discharge date and time: 6/17/2019     Attending Physician: Barbra Phillips MD     Primary Care Physician: Shabnam Ochoa resolved. Felt stomach sxs 2/2 scope.  Tolerating diet, will have lunch at home    Gen: No acute distress, alert and oriented  CV: RRR, +s1/s2  Lungs: CTAB, good respiratory effort  Abdomen: s/nt/nd  Ext: Moves all 4 extremities, no c/c/e  Neuro: CN Intact, to assess. The possibility of mild transverse colitis difficult to exclude. Small bowel caliber normal.  No ascites or free air. No retroperitoneal adenopathy. Moderate atelectasis at the lung base.  Note is made of degenerative changes present in the

## 2019-06-17 NOTE — PROGRESS NOTES
patient states his pain is controlled and that he's ready for discharge this am. Has been up ambulating in hallway with a steady gait.

## 2019-06-17 NOTE — PROGRESS NOTES
Patient discharged to home explained all medications and follow up with primary care doctor. Patient verbalized his understanding of teaching.

## 2019-06-17 NOTE — PROGRESS NOTES
[facility]  GI Progress Note      Valeria Belgica Patient Status:  Inpatient    10/6/1950 MRN [id number]   [facility] 4NW-A Attending Agnes Wiley MD   Hosp Day # 5 PCP Lidia Denny MD          SUBJECTIVE:     Feels better.  No na

## 2019-12-02 ENCOUNTER — APPOINTMENT (OUTPATIENT)
Dept: GENERAL RADIOLOGY | Facility: HOSPITAL | Age: 69
End: 2019-12-02
Attending: EMERGENCY MEDICINE
Payer: COMMERCIAL

## 2019-12-02 ENCOUNTER — HOSPITAL ENCOUNTER (EMERGENCY)
Facility: HOSPITAL | Age: 69
Discharge: HOME | End: 2019-12-02
Attending: EMERGENCY MEDICINE
Payer: COMMERCIAL

## 2019-12-02 ENCOUNTER — APPOINTMENT (OUTPATIENT)
Dept: CT IMAGING | Facility: HOSPITAL | Age: 69
End: 2019-12-02
Attending: EMERGENCY MEDICINE
Payer: COMMERCIAL

## 2019-12-02 VITALS
RESPIRATION RATE: 19 BRPM | HEIGHT: 68 IN | SYSTOLIC BLOOD PRESSURE: 145 MMHG | HEART RATE: 98 BPM | BODY MASS INDEX: 30.31 KG/M2 | TEMPERATURE: 98 F | OXYGEN SATURATION: 94 % | DIASTOLIC BLOOD PRESSURE: 64 MMHG | WEIGHT: 200 LBS

## 2019-12-02 DIAGNOSIS — K52.9 GASTROENTERITIS: Primary | ICD-10-CM

## 2019-12-02 PROCEDURE — 83690 ASSAY OF LIPASE: CPT | Performed by: EMERGENCY MEDICINE

## 2019-12-02 PROCEDURE — 99285 EMERGENCY DEPT VISIT HI MDM: CPT

## 2019-12-02 PROCEDURE — 71045 X-RAY EXAM CHEST 1 VIEW: CPT | Performed by: EMERGENCY MEDICINE

## 2019-12-02 PROCEDURE — 96374 THER/PROPH/DIAG INJ IV PUSH: CPT

## 2019-12-02 PROCEDURE — 96375 TX/PRO/DX INJ NEW DRUG ADDON: CPT

## 2019-12-02 PROCEDURE — 99284 EMERGENCY DEPT VISIT MOD MDM: CPT

## 2019-12-02 PROCEDURE — 74018 RADEX ABDOMEN 1 VIEW: CPT | Performed by: EMERGENCY MEDICINE

## 2019-12-02 PROCEDURE — 85025 COMPLETE CBC W/AUTO DIFF WBC: CPT | Performed by: EMERGENCY MEDICINE

## 2019-12-02 PROCEDURE — 80053 COMPREHEN METABOLIC PANEL: CPT | Performed by: EMERGENCY MEDICINE

## 2019-12-02 PROCEDURE — 96361 HYDRATE IV INFUSION ADD-ON: CPT

## 2019-12-02 PROCEDURE — 74177 CT ABD & PELVIS W/CONTRAST: CPT | Performed by: EMERGENCY MEDICINE

## 2019-12-02 RX ORDER — ONDANSETRON 4 MG/1
4 TABLET, ORALLY DISINTEGRATING ORAL ONCE
Status: COMPLETED | OUTPATIENT
Start: 2019-12-02 | End: 2019-12-02

## 2019-12-02 RX ORDER — METOCLOPRAMIDE 10 MG/1
10 TABLET ORAL 3 TIMES DAILY PRN
Qty: 20 TABLET | Refills: 0 | Status: SHIPPED | OUTPATIENT
Start: 2019-12-02 | End: 2019-12-11 | Stop reason: ALTCHOICE

## 2019-12-02 RX ORDER — ONDANSETRON 2 MG/ML
4 INJECTION INTRAMUSCULAR; INTRAVENOUS ONCE
Status: COMPLETED | OUTPATIENT
Start: 2019-12-02 | End: 2019-12-02

## 2019-12-02 RX ORDER — HYDROMORPHONE HYDROCHLORIDE 1 MG/ML
0.5 INJECTION, SOLUTION INTRAMUSCULAR; INTRAVENOUS; SUBCUTANEOUS EVERY 30 MIN PRN
Status: DISCONTINUED | OUTPATIENT
Start: 2019-12-02 | End: 2019-12-03

## 2019-12-02 RX ORDER — METOCLOPRAMIDE HYDROCHLORIDE 5 MG/ML
10 INJECTION INTRAMUSCULAR; INTRAVENOUS ONCE
Status: COMPLETED | OUTPATIENT
Start: 2019-12-02 | End: 2019-12-02

## 2019-12-02 RX ORDER — ONDANSETRON 4 MG/1
4 TABLET, ORALLY DISINTEGRATING ORAL EVERY 8 HOURS PRN
Qty: 20 TABLET | Refills: 0 | Status: SHIPPED | OUTPATIENT
Start: 2019-12-02 | End: 2019-12-09

## 2019-12-02 RX ORDER — DIPHENHYDRAMINE HYDROCHLORIDE 50 MG/ML
25 INJECTION INTRAMUSCULAR; INTRAVENOUS ONCE
Status: COMPLETED | OUTPATIENT
Start: 2019-12-02 | End: 2019-12-02

## 2019-12-03 NOTE — ED INITIAL ASSESSMENT (HPI)
Pt c/o vomiting since yesterday with umbilicus abd pain. Pt denies tenderness. IV established, fluids infusing, pt received zofran and states he feels a little less nauseated.

## 2019-12-03 NOTE — ED PROVIDER NOTES
Patient Seen in: [facility] Emergency Department      History   Patient presents with:  Nausea/Vomiting/Diarrhea (gastrointestinal)    Stated Complaint: vomiting x 2 days; IV establishe and zofran given     HPI    60-year-old male presents for evalu BMI 30.41 kg/m²         Physical Exam    General: Alert, oriented, no apparent distress  HEENT: Atraumatic, normocephalic. Pupils equal reactive. Extraocular motions intact. Oropharynx clear. Neck: Supple  Lungs: Clear to auscultation bilaterally. RAINBOW DRAW LIGHT GREEN           MDM        Xr Abdomen (1 View) (cpt=74018)    Result Date: 12/2/2019  PROCEDURE:  XR ABDOMEN (1 VIEW) (CPT=74018)  INDICATIONS:  vomiting x 2 days; IV establishe and zofran given  COMPARISON:  None.   TECHNIQUE:  Supine calcification. ADRENALS:  No mass or enlargement. AORTA/VASCULAR:  Trace atherosclerosis. No aneurysm. RETROPERITONEUM:  No mass or adenopathy. BOWEL/MESENTERY:  Multiple uncomplicated diverticula of sigmoid and descending colon.   No bowel distention o 1932)   sodium chloride 0.9% IV bolus 1,000 mL (0 mL Intravenous Stopped 12/2/19 1931)   ondansetron HCl (ZOFRAN) injection 4 mg (4 mg Intravenous Given 12/2/19 1818)   iohexol (OMNIPAQUE) 350 MG/ML injection 100 mL (100 mL Intravenous Given 12/2/19 1957)

## 2020-02-24 ENCOUNTER — APPOINTMENT (OUTPATIENT)
Dept: CT IMAGING | Facility: HOSPITAL | Age: 70
End: 2020-02-24
Attending: PHYSICIAN ASSISTANT
Payer: COMMERCIAL

## 2020-02-24 ENCOUNTER — HOSPITAL ENCOUNTER (OUTPATIENT)
Facility: HOSPITAL | Age: 70
Setting detail: OBSERVATION
Discharge: HOME | End: 2020-02-28
Attending: EMERGENCY MEDICINE | Admitting: HOSPITALIST
Payer: COMMERCIAL

## 2020-02-24 DIAGNOSIS — R11.2 INTRACTABLE VOMITING WITH NAUSEA, UNSPECIFIED VOMITING TYPE: Primary | ICD-10-CM

## 2020-02-24 DIAGNOSIS — R10.9 ABDOMINAL PAIN, ACUTE: ICD-10-CM

## 2020-02-24 LAB
ALBUMIN SERPL-MCNC: 4 G/DL (ref 3.4–5)
ALBUMIN/GLOB SERPL: 0.8 {RATIO} (ref 1–2)
ALP LIVER SERPL-CCNC: 117 U/L (ref 45–117)
ALT SERPL-CCNC: 37 U/L (ref 16–61)
ANION GAP SERPL CALC-SCNC: 4 MMOL/L (ref 0–18)
AST SERPL-CCNC: 24 U/L (ref 15–37)
ATRIAL RATE: 84 BPM
BASOPHILS # BLD AUTO: 0.05 X10(3) UL (ref 0–0.2)
BASOPHILS NFR BLD AUTO: 0.3 %
BILIRUB SERPL-MCNC: 0.3 MG/DL (ref 0.1–2)
BILIRUB UR QL STRIP.AUTO: NEGATIVE
BUN BLD-MCNC: 10 MG/DL (ref 7–18)
BUN/CREAT SERPL: 8.6 (ref 10–20)
CALCIUM BLD-MCNC: 9.7 MG/DL (ref 8.5–10.1)
CHLORIDE SERPL-SCNC: 110 MMOL/L (ref 98–112)
CLARITY UR REFRACT.AUTO: CLEAR
CO2 SERPL-SCNC: 25 MMOL/L (ref 21–32)
CREAT BLD-MCNC: 1.16 MG/DL (ref 0.7–1.3)
DEPRECATED RDW RBC AUTO: 44.8 FL (ref 35.1–46.3)
EOSINOPHIL # BLD AUTO: 0.03 X10(3) UL (ref 0–0.7)
EOSINOPHIL NFR BLD AUTO: 0.2 %
ERYTHROCYTE [DISTWIDTH] IN BLOOD BY AUTOMATED COUNT: 14.6 % (ref 11–15)
GLOBULIN PLAS-MCNC: 5 G/DL (ref 2.8–4.4)
GLUCOSE BLD-MCNC: 132 MG/DL (ref 70–99)
GLUCOSE UR STRIP.AUTO-MCNC: 50 MG/DL
HCT VFR BLD AUTO: 43.9 % (ref 39–53)
HGB BLD-MCNC: 14.1 G/DL (ref 13–17.5)
IMM GRANULOCYTES # BLD AUTO: 0.06 X10(3) UL (ref 0–1)
IMM GRANULOCYTES NFR BLD: 0.4 %
KETONES UR STRIP.AUTO-MCNC: 20 MG/DL
LEUKOCYTE ESTERASE UR QL STRIP.AUTO: NEGATIVE
LIPASE SERPL-CCNC: 77 U/L (ref 73–393)
LYMPHOCYTES # BLD AUTO: 3.24 X10(3) UL (ref 1–4)
LYMPHOCYTES NFR BLD AUTO: 20.5 %
M PROTEIN MFR SERPL ELPH: 9 G/DL (ref 6.4–8.2)
MCH RBC QN AUTO: 27.3 PG (ref 26–34)
MCHC RBC AUTO-ENTMCNC: 32.1 G/DL (ref 31–37)
MCV RBC AUTO: 84.9 FL (ref 80–100)
MONOCYTES # BLD AUTO: 1.02 X10(3) UL (ref 0.1–1)
MONOCYTES NFR BLD AUTO: 6.5 %
NEUTROPHILS # BLD AUTO: 11.37 X10 (3) UL (ref 1.5–7.7)
NEUTROPHILS # BLD AUTO: 11.37 X10(3) UL (ref 1.5–7.7)
NEUTROPHILS NFR BLD AUTO: 72.1 %
NITRITE UR QL STRIP.AUTO: NEGATIVE
OSMOLALITY SERPL CALC.SUM OF ELEC: 289 MOSM/KG (ref 275–295)
P AXIS: 51 DEGREES
P-R INTERVAL: 182 MS
PH UR STRIP.AUTO: 8 [PH] (ref 4.5–8)
PLATELET # BLD AUTO: 292 10(3)UL (ref 150–450)
POTASSIUM SERPL-SCNC: 3.9 MMOL/L (ref 3.5–5.1)
PROT UR STRIP.AUTO-MCNC: NEGATIVE MG/DL
Q-T INTERVAL: 378 MS
QRS DURATION: 86 MS
QTC CALCULATION (BEZET): 446 MS
R AXIS: -24 DEGREES
RBC # BLD AUTO: 5.17 X10(6)UL (ref 3.8–5.8)
RBC UR QL AUTO: NEGATIVE
SODIUM SERPL-SCNC: 139 MMOL/L (ref 136–145)
SP GR UR STRIP.AUTO: 1.01 (ref 1–1.03)
T AXIS: 50 DEGREES
UROBILINOGEN UR STRIP.AUTO-MCNC: <2 MG/DL
VENTRICULAR RATE: 84 BPM
WBC # BLD AUTO: 15.8 X10(3) UL (ref 4–11)

## 2020-02-24 PROCEDURE — 99285 EMERGENCY DEPT VISIT HI MDM: CPT

## 2020-02-24 PROCEDURE — 96376 TX/PRO/DX INJ SAME DRUG ADON: CPT

## 2020-02-24 PROCEDURE — 74177 CT ABD & PELVIS W/CONTRAST: CPT | Performed by: PHYSICIAN ASSISTANT

## 2020-02-24 PROCEDURE — 83690 ASSAY OF LIPASE: CPT | Performed by: EMERGENCY MEDICINE

## 2020-02-24 PROCEDURE — 80053 COMPREHEN METABOLIC PANEL: CPT | Performed by: EMERGENCY MEDICINE

## 2020-02-24 PROCEDURE — 85025 COMPLETE CBC W/AUTO DIFF WBC: CPT | Performed by: EMERGENCY MEDICINE

## 2020-02-24 PROCEDURE — 96374 THER/PROPH/DIAG INJ IV PUSH: CPT

## 2020-02-24 PROCEDURE — 81003 URINALYSIS AUTO W/O SCOPE: CPT | Performed by: EMERGENCY MEDICINE

## 2020-02-24 PROCEDURE — 96375 TX/PRO/DX INJ NEW DRUG ADDON: CPT

## 2020-02-24 PROCEDURE — 96361 HYDRATE IV INFUSION ADD-ON: CPT

## 2020-02-24 PROCEDURE — 93005 ELECTROCARDIOGRAM TRACING: CPT

## 2020-02-24 PROCEDURE — 93010 ELECTROCARDIOGRAM REPORT: CPT

## 2020-02-24 RX ORDER — ONDANSETRON 2 MG/ML
4 INJECTION INTRAMUSCULAR; INTRAVENOUS ONCE
Status: COMPLETED | OUTPATIENT
Start: 2020-02-24 | End: 2020-02-24

## 2020-02-24 RX ORDER — AMLODIPINE BESYLATE 5 MG/1
10 TABLET ORAL DAILY
Status: DISCONTINUED | OUTPATIENT
Start: 2020-02-25 | End: 2020-02-28

## 2020-02-24 RX ORDER — DIPHENHYDRAMINE HYDROCHLORIDE 50 MG/ML
25 INJECTION INTRAMUSCULAR; INTRAVENOUS ONCE
Status: COMPLETED | OUTPATIENT
Start: 2020-02-24 | End: 2020-02-24

## 2020-02-24 RX ORDER — HYDROMORPHONE HYDROCHLORIDE 1 MG/ML
0.5 INJECTION, SOLUTION INTRAMUSCULAR; INTRAVENOUS; SUBCUTANEOUS ONCE
Status: COMPLETED | OUTPATIENT
Start: 2020-02-24 | End: 2020-02-24

## 2020-02-24 RX ORDER — HYDROMORPHONE HYDROCHLORIDE 1 MG/ML
0.2 INJECTION, SOLUTION INTRAMUSCULAR; INTRAVENOUS; SUBCUTANEOUS EVERY 2 HOUR PRN
Status: DISCONTINUED | OUTPATIENT
Start: 2020-02-24 | End: 2020-02-28

## 2020-02-24 RX ORDER — AMLODIPINE BESYLATE 10 MG/1
10 TABLET ORAL DAILY
COMMUNITY
End: 2020-04-24

## 2020-02-24 RX ORDER — HYDROMORPHONE HYDROCHLORIDE 1 MG/ML
0.4 INJECTION, SOLUTION INTRAMUSCULAR; INTRAVENOUS; SUBCUTANEOUS EVERY 2 HOUR PRN
Status: DISCONTINUED | OUTPATIENT
Start: 2020-02-24 | End: 2020-02-28

## 2020-02-24 RX ORDER — ONDANSETRON 2 MG/ML
4 INJECTION INTRAMUSCULAR; INTRAVENOUS EVERY 6 HOURS PRN
Status: DISCONTINUED | OUTPATIENT
Start: 2020-02-24 | End: 2020-02-28

## 2020-02-24 RX ORDER — HYDROMORPHONE HYDROCHLORIDE 1 MG/ML
1 INJECTION, SOLUTION INTRAMUSCULAR; INTRAVENOUS; SUBCUTANEOUS EVERY 30 MIN PRN
Status: DISCONTINUED | OUTPATIENT
Start: 2020-02-24 | End: 2020-02-24

## 2020-02-24 RX ORDER — KETOROLAC TROMETHAMINE 15 MG/ML
15 INJECTION, SOLUTION INTRAMUSCULAR; INTRAVENOUS EVERY 6 HOURS PRN
Status: DISPENSED | OUTPATIENT
Start: 2020-02-24 | End: 2020-02-26

## 2020-02-24 RX ORDER — HYDROMORPHONE HYDROCHLORIDE 1 MG/ML
0.8 INJECTION, SOLUTION INTRAMUSCULAR; INTRAVENOUS; SUBCUTANEOUS EVERY 2 HOUR PRN
Status: DISCONTINUED | OUTPATIENT
Start: 2020-02-24 | End: 2020-02-28

## 2020-02-24 RX ORDER — SODIUM CHLORIDE 9 MG/ML
INJECTION, SOLUTION INTRAVENOUS CONTINUOUS
Status: DISCONTINUED | OUTPATIENT
Start: 2020-02-25 | End: 2020-02-28

## 2020-02-24 RX ORDER — MORPHINE SULFATE 4 MG/ML
4 INJECTION, SOLUTION INTRAMUSCULAR; INTRAVENOUS EVERY 30 MIN PRN
Status: ACTIVE | OUTPATIENT
Start: 2020-02-24 | End: 2020-02-24

## 2020-02-24 RX ORDER — ONDANSETRON 2 MG/ML
4 INJECTION INTRAMUSCULAR; INTRAVENOUS EVERY 4 HOURS PRN
Status: DISCONTINUED | OUTPATIENT
Start: 2020-02-24 | End: 2020-02-24

## 2020-02-24 RX ORDER — SODIUM CHLORIDE 9 MG/ML
INJECTION, SOLUTION INTRAVENOUS CONTINUOUS
Status: DISCONTINUED | OUTPATIENT
Start: 2020-02-24 | End: 2020-02-25

## 2020-02-24 RX ORDER — HYDROMORPHONE HYDROCHLORIDE 1 MG/ML
1 INJECTION, SOLUTION INTRAMUSCULAR; INTRAVENOUS; SUBCUTANEOUS ONCE
Status: COMPLETED | OUTPATIENT
Start: 2020-02-24 | End: 2020-02-24

## 2020-02-24 RX ORDER — METOCLOPRAMIDE HYDROCHLORIDE 5 MG/ML
10 INJECTION INTRAMUSCULAR; INTRAVENOUS ONCE
Status: COMPLETED | OUTPATIENT
Start: 2020-02-24 | End: 2020-02-24

## 2020-02-24 NOTE — ED INITIAL ASSESSMENT (HPI)
Patient to ER via EMS from home with complaint of acute onset nausea/vomiting/diarrhea, denies fever. Patient noted to be diaphoretic.

## 2020-02-24 NOTE — ED PROVIDER NOTES
Patient Seen in: [facility] Emergency Department      History   Patient presents with:  Nausea/Vomiting/Diarrhea    Stated Complaint: nausea/vomiting/diarrhea     ARACELI Marquis is a 71-year-old ill-appearing male who presents today for nausea, vomit 02/24/20 1536 98 °F (36.7 °C)   Temp src 02/24/20 1536 Oral   SpO2 02/24/20 1536 100 %   O2 Device 02/24/20 2135 None (Room air)       Current:/66 (BP Location: Right arm)   Pulse 101   Temp 98.3 °F (36.8 °C) (Oral)   Resp 20   SpO2 96%         Physi ---------                               -----------         ------                     CBC W/ DIFFERENTIAL[[id number]]          Abnormal            Final result                 Please view results for these tests on the individual orders. pole the right kidney measures 1.9 cm. Cyst in the upper pole left kidney measures 1.3 cm. ADRENALS:  No mass or enlargement. AORTA/VASCULAR:  No aneurysm or dissection. RETROPERITONEUM:  No mass or adenopathy.   BOWEL/MESENTERY:  Diverticulosis of the c (primary encounter diagnosis)  Abdominal pain, acute    Disposition:  Admit  2/24/2020  7:43 pm    Follow-up:  No follow-up provider specified.       Medications Prescribed:  Current Discharge Medication List                   Present on Admission  Date Rev

## 2020-02-25 LAB
ALBUMIN SERPL-MCNC: 3.8 G/DL (ref 3.4–5)
ALBUMIN/GLOB SERPL: 0.8 {RATIO} (ref 1–2)
ALP LIVER SERPL-CCNC: 99 U/L (ref 45–117)
ALT SERPL-CCNC: 29 U/L (ref 16–61)
ANION GAP SERPL CALC-SCNC: 5 MMOL/L (ref 0–18)
AST SERPL-CCNC: 13 U/L (ref 15–37)
BASOPHILS # BLD AUTO: 0.04 X10(3) UL (ref 0–0.2)
BASOPHILS NFR BLD AUTO: 0.2 %
BILIRUB SERPL-MCNC: 0.4 MG/DL (ref 0.1–2)
BUN BLD-MCNC: 9 MG/DL (ref 7–18)
BUN/CREAT SERPL: 7.8 (ref 10–20)
CALCIUM BLD-MCNC: 9 MG/DL (ref 8.5–10.1)
CHLORIDE SERPL-SCNC: 112 MMOL/L (ref 98–112)
CO2 SERPL-SCNC: 25 MMOL/L (ref 21–32)
CREAT BLD-MCNC: 1.15 MG/DL (ref 0.7–1.3)
DEPRECATED RDW RBC AUTO: 44.8 FL (ref 35.1–46.3)
EOSINOPHIL # BLD AUTO: 0 X10(3) UL (ref 0–0.7)
EOSINOPHIL NFR BLD AUTO: 0 %
ERYTHROCYTE [DISTWIDTH] IN BLOOD BY AUTOMATED COUNT: 14.6 % (ref 11–15)
GLOBULIN PLAS-MCNC: 4.8 G/DL (ref 2.8–4.4)
GLUCOSE BLD-MCNC: 136 MG/DL (ref 70–99)
HCT VFR BLD AUTO: 39.9 % (ref 39–53)
HGB BLD-MCNC: 12.9 G/DL (ref 13–17.5)
IMM GRANULOCYTES # BLD AUTO: 0.06 X10(3) UL (ref 0–1)
IMM GRANULOCYTES NFR BLD: 0.4 %
LYMPHOCYTES # BLD AUTO: 1.7 X10(3) UL (ref 1–4)
LYMPHOCYTES NFR BLD AUTO: 10.4 %
M PROTEIN MFR SERPL ELPH: 8.6 G/DL (ref 6.4–8.2)
MCH RBC QN AUTO: 27.2 PG (ref 26–34)
MCHC RBC AUTO-ENTMCNC: 32.3 G/DL (ref 31–37)
MCV RBC AUTO: 84.2 FL (ref 80–100)
MONOCYTES # BLD AUTO: 1.02 X10(3) UL (ref 0.1–1)
MONOCYTES NFR BLD AUTO: 6.2 %
NEUTROPHILS # BLD AUTO: 13.52 X10 (3) UL (ref 1.5–7.7)
NEUTROPHILS # BLD AUTO: 13.52 X10(3) UL (ref 1.5–7.7)
NEUTROPHILS NFR BLD AUTO: 82.8 %
OSMOLALITY SERPL CALC.SUM OF ELEC: 295 MOSM/KG (ref 275–295)
PLATELET # BLD AUTO: 303 10(3)UL (ref 150–450)
POTASSIUM SERPL-SCNC: 3.9 MMOL/L (ref 3.5–5.1)
PROCALCITONIN SERPL-MCNC: 0.1 NG/ML
RBC # BLD AUTO: 4.74 X10(6)UL (ref 3.8–5.8)
SODIUM SERPL-SCNC: 142 MMOL/L (ref 136–145)
TROPONIN I SERPL-MCNC: <0.045 NG/ML (ref ?–0.04)
WBC # BLD AUTO: 16.3 X10(3) UL (ref 4–11)

## 2020-02-25 PROCEDURE — 85025 COMPLETE CBC W/AUTO DIFF WBC: CPT | Performed by: INTERNAL MEDICINE

## 2020-02-25 PROCEDURE — 84145 PROCALCITONIN (PCT): CPT | Performed by: HOSPITALIST

## 2020-02-25 PROCEDURE — 80053 COMPREHEN METABOLIC PANEL: CPT | Performed by: INTERNAL MEDICINE

## 2020-02-25 PROCEDURE — 84484 ASSAY OF TROPONIN QUANT: CPT | Performed by: HOSPITALIST

## 2020-02-25 RX ORDER — FAMOTIDINE 20 MG/1
20 TABLET ORAL 2 TIMES DAILY
Status: DISCONTINUED | OUTPATIENT
Start: 2020-02-25 | End: 2020-02-28

## 2020-02-25 NOTE — PROGRESS NOTES
NURSING ADMISSION NOTE      Patient admitted via Cart from ED  Oriented to room. Safety precautions initiated. Bed in low position. Call light in reach.

## 2020-02-25 NOTE — H&P
[facility] hospitalist H+P  PCP Fran Sagastume MD  CC nausea/emesis/diarrhea  HPI 76 yo male with multiple medical problems including but not limited to HTN, diverticulosis, renal cancer came with c/o nausea/emesis/diarrhea  No bleeding in emesis or diarrhea  Also d on file        Minutes per session: Not on file      Stress: Not on file    Relationships      Social connections:        Talks on phone: Not on file        Gets together: Not on file        Attends Nondenominational service: Not on file        Active member of cl stable. 3. Infiltration of mesenteric fat with minimal mesenteric lymphadenopathy is stable. This is likely sequelae of prior chronic inflammatory process. 4. Enlarged prostate gland measures 6.2 cm.        Assessment/plan    Abdominal pain, nausea

## 2020-02-25 NOTE — PLAN OF CARE
Pt alert and orientatedx4. Room air. NPO w/ sips with meds and ice chips. Dilaudid used for pain management. Mild nausea but refuses any medication at this time. Voiding in urinal. New IV placed in right hand.  Per patient, left foramen IV burns when fluids including physical limitations  - Instruct pt to call for assistance with activity based on assessment  - Modify environment to reduce risk of injury  - Provide assistive devices as appropriate  - Consider OT/PT consult to assist with strengthening/mobilit

## 2020-02-25 NOTE — PLAN OF CARE
Patient alert and oriented x4. Pain controlled with Toradol and Dilaudid. Pain and nausea increased after drinking some ginger ale. Passing gas. Abdomen soft. Bowel sounds active. RA. VSS. Up ad dorothy, ambulating in halls. IV fluids infusing.  Plan of care di schedule  Outcome: Progressing     Problem: DISCHARGE PLANNING  Goal: Discharge to home or other facility with appropriate resources  Description  INTERVENTIONS:  - Identify barriers to discharge w/pt and caregiver  - Include patient/family/discharge partn

## 2020-02-25 NOTE — ED NOTES
Assumed care[facility] Pt c/o n/v, pain to abd, 8/10.  Will give meds as ordered and cont to monitor pt

## 2020-02-26 RX ORDER — METOCLOPRAMIDE HYDROCHLORIDE 5 MG/ML
5 INJECTION INTRAMUSCULAR; INTRAVENOUS EVERY 6 HOURS PRN
Status: DISCONTINUED | OUTPATIENT
Start: 2020-02-26 | End: 2020-02-28

## 2020-02-26 RX ORDER — METOCLOPRAMIDE HYDROCHLORIDE 5 MG/5ML
5 SOLUTION ORAL EVERY 8 HOURS PRN
Status: DISCONTINUED | OUTPATIENT
Start: 2020-02-26 | End: 2020-02-26

## 2020-02-26 RX ORDER — HEPARIN SODIUM 5000 [USP'U]/ML
5000 INJECTION, SOLUTION INTRAVENOUS; SUBCUTANEOUS EVERY 8 HOURS SCHEDULED
Status: DISCONTINUED | OUTPATIENT
Start: 2020-02-26 | End: 2020-02-28
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
## 2020-02-26 NOTE — PLAN OF CARE
Pt alert and orientatedx4. Room air. Full liquid diet and tolerating poorly. Passing gas. Voiding. Dilaudid and toradol used for pain management. Up ad dorothy. IVF infusing. Abdomen soft. Bowel sounds present. Denies chest pain or shortness of breath.  Denies schedule  Outcome: Progressing     Problem: DISCHARGE PLANNING  Goal: Discharge to home or other facility with appropriate resources  Description  INTERVENTIONS:  - Identify barriers to discharge w/pt and caregiver  - Include patient/family/discharge partn

## 2020-02-26 NOTE — PLAN OF CARE
Patient alert and oriented x4. Denies pain. Denies nausea. Passing gas. No BM since admission. Clear liquid diet, pt has not had anything to eat yet. RA. VSS. IV fluids infusing. Plan of care discussed. All questions and concerns addressed.  Will continue t appropriate  - Consider OT/PT consult to assist with strengthening/mobility  - Encourage toileting schedule  Outcome: Progressing     Problem: DISCHARGE PLANNING  Goal: Discharge to home or other facility with appropriate resources  Description  INTERVENTI

## 2020-02-26 NOTE — PLAN OF CARE
Pt is A&O x4. VSS and afebrile. O2 sats WNL on room air, lung sounds clear bilaterally. C/o mild abdominal pain rates as 1 on 0 to 10 scale. Reports headache from NG tube, providing Tylenol PRN w/ good relief. Abdomen is soft, nondistended and tender.  Edvin on patient safety including physical limitations  - Instruct pt to call for assistance with activity based on assessment  - Modify environment to reduce risk of injury  - Provide assistive devices as appropriate  - Consider OT/PT consult to assist with str

## 2020-02-26 NOTE — PROGRESS NOTES
[facility] hospitalist daily note  Patient was seen/examined on 2/26/20    S; patient did not tolerate diet yesterday  This am feeling better so wanted to try liquid diet  No chest pain, no SOB, no nausea during my visit, no diarrhea    Medications in Epic  PE No dysuria  Instructed pt to follow up with PCP and check CBC at the follow up. .     Hx of renal cancer follow up with urology     HTN BP controlled     Preventative heparin subcutaneous ordered    Another hospitalist will see this patient on 2/27/20

## 2020-02-27 LAB
ANION GAP SERPL CALC-SCNC: 5 MMOL/L (ref 0–18)
BASOPHILS # BLD AUTO: 0.05 X10(3) UL (ref 0–0.2)
BASOPHILS NFR BLD AUTO: 0.3 %
BUN BLD-MCNC: 9 MG/DL (ref 7–18)
BUN/CREAT SERPL: 8.9 (ref 10–20)
CALCIUM BLD-MCNC: 9.2 MG/DL (ref 8.5–10.1)
CHLORIDE SERPL-SCNC: 109 MMOL/L (ref 98–112)
CO2 SERPL-SCNC: 25 MMOL/L (ref 21–32)
CREAT BLD-MCNC: 1.01 MG/DL (ref 0.7–1.3)
DEPRECATED RDW RBC AUTO: 45.3 FL (ref 35.1–46.3)
EOSINOPHIL # BLD AUTO: 0.02 X10(3) UL (ref 0–0.7)
EOSINOPHIL NFR BLD AUTO: 0.1 %
ERYTHROCYTE [DISTWIDTH] IN BLOOD BY AUTOMATED COUNT: 14.5 % (ref 11–15)
GLUCOSE BLD-MCNC: 120 MG/DL (ref 70–99)
HCT VFR BLD AUTO: 40.5 % (ref 39–53)
HGB BLD-MCNC: 13.1 G/DL (ref 13–17.5)
IMM GRANULOCYTES # BLD AUTO: 0.05 X10(3) UL (ref 0–1)
IMM GRANULOCYTES NFR BLD: 0.3 %
LYMPHOCYTES # BLD AUTO: 3.08 X10(3) UL (ref 1–4)
LYMPHOCYTES NFR BLD AUTO: 20.6 %
MCH RBC QN AUTO: 27.5 PG (ref 26–34)
MCHC RBC AUTO-ENTMCNC: 32.3 G/DL (ref 31–37)
MCV RBC AUTO: 85.1 FL (ref 80–100)
MONOCYTES # BLD AUTO: 1.23 X10(3) UL (ref 0.1–1)
MONOCYTES NFR BLD AUTO: 8.2 %
NEUTROPHILS # BLD AUTO: 10.54 X10 (3) UL (ref 1.5–7.7)
NEUTROPHILS # BLD AUTO: 10.54 X10(3) UL (ref 1.5–7.7)
NEUTROPHILS NFR BLD AUTO: 70.5 %
OSMOLALITY SERPL CALC.SUM OF ELEC: 288 MOSM/KG (ref 275–295)
PLATELET # BLD AUTO: 322 10(3)UL (ref 150–450)
POTASSIUM SERPL-SCNC: 3.5 MMOL/L (ref 3.5–5.1)
POTASSIUM SERPL-SCNC: 3.9 MMOL/L (ref 3.5–5.1)
RBC # BLD AUTO: 4.76 X10(6)UL (ref 3.8–5.8)
SODIUM SERPL-SCNC: 139 MMOL/L (ref 136–145)
WBC # BLD AUTO: 15 X10(3) UL (ref 4–11)

## 2020-02-27 PROCEDURE — 84132 ASSAY OF SERUM POTASSIUM: CPT | Performed by: INTERNAL MEDICINE

## 2020-02-27 PROCEDURE — 80048 BASIC METABOLIC PNL TOTAL CA: CPT | Performed by: HOSPITALIST

## 2020-02-27 PROCEDURE — 85025 COMPLETE CBC W/AUTO DIFF WBC: CPT | Performed by: HOSPITALIST

## 2020-02-27 RX ORDER — ACETAMINOPHEN 325 MG/1
650 TABLET ORAL EVERY 6 HOURS PRN
Status: DISCONTINUED | OUTPATIENT
Start: 2020-02-27 | End: 2020-02-28

## 2020-02-27 RX ORDER — POTASSIUM CHLORIDE 20 MEQ/1
40 TABLET, EXTENDED RELEASE ORAL EVERY 4 HOURS
Status: COMPLETED | OUTPATIENT
Start: 2020-02-27 | End: 2020-02-27

## 2020-02-27 RX ORDER — HYDROCODONE BITARTRATE AND ACETAMINOPHEN 5; 325 MG/1; MG/1
1 TABLET ORAL EVERY 6 HOURS PRN
Status: DISCONTINUED | OUTPATIENT
Start: 2020-02-27 | End: 2020-02-28

## 2020-02-27 RX ORDER — MAGNESIUM HYDROXIDE/ALUMINUM HYDROXICE/SIMETHICONE 120; 1200; 1200 MG/30ML; MG/30ML; MG/30ML
30 SUSPENSION ORAL 4 TIMES DAILY PRN
Status: DISCONTINUED | OUTPATIENT
Start: 2020-02-27 | End: 2020-02-28

## 2020-02-27 NOTE — PROGRESS NOTES
[facility]    Progress Note    Fito Parker Patient Status:  Observation    10/6/1950 MRN [id number]   [facility] 3NW-A Attending Lebron Sotomayor DO   Hosp Day # 0 PCP Manan Sheikh MD     Subjective:  Fito Parker is a(n) 77 yea

## 2020-02-27 NOTE — PROGRESS NOTES
[facility] Hospitalist Team  Progress Note      Kandis Menendez  10/6/1950    Assessment/Plan:       Abdominal pain, nausea/emesis, diarrhea  CT abd reviewed and no acute inflammatory process.  Stable gallbladder calcification, stable infiltration of mesenteric fat 109   CO2 25.0 25.0 25.0   BUN 10 9 9   CREATSERUM 1.16 1.15 1.01   CA 9.7 9.0 9.2   * 136* 120*       Recent Labs   Lab 02/24/20  1545 02/25/20  0513   ALT 37 29   AST 24 13*   ALB 4.0 3.8       No results for input(s): PGLU in the last 168 hours.

## 2020-02-28 VITALS
SYSTOLIC BLOOD PRESSURE: 143 MMHG | BODY MASS INDEX: 30 KG/M2 | TEMPERATURE: 98 F | RESPIRATION RATE: 20 BRPM | OXYGEN SATURATION: 96 % | DIASTOLIC BLOOD PRESSURE: 72 MMHG | WEIGHT: 200 LBS | HEART RATE: 83 BPM

## 2020-02-28 NOTE — PLAN OF CARE
Pt taking clear liquids & broth. Denies pain or nausea, wants to go home. Dr Brandy Alan contacted, [facility] for discharge on liquids for 2-3 days. Discharge instructions reviewed with pt including diet & follow up care. IV removed with tip intact, site clear.  Declined

## 2020-02-28 NOTE — PROGRESS NOTES
[facility]    Progress Note    Mecca Hogan Patient Status:  Observation    10/6/1950 MRN [id number]   [facility] 3NW-A Attending Moon Dimas DO   Hosp Day # 0 PCP Elsy Armstrong MD     Subjective:  Mecca Hogan is a(n) 77 yea

## 2020-02-28 NOTE — DISCHARGE SUMMARY
General Medicine Discharge Summary     Patient ID:  Brian Whitehead  77year old  10/6/1950    Admit date: 2/24/2020    Discharge date and time: 2/28/20    Attending Physician: Estella Rutledge DO ADAT     Hx of renal cancer follow up with urology     HTN BP controlled     Consults: IP CONSULT TO HOSPITALIST  IP CONSULT TO GASTROENTEROLOGY    Operative Procedures:        Patient instructions:      Current Discharge Medication List    CONTINUE these

## 2020-02-28 NOTE — PLAN OF CARE
Pt alert and orientatedx4. Room air. Maalox was given PRN. Full liquid diet but only tolerating clear. Voiding. Passing gas. Abdomen soft, non-tender. Bowel sounds active. IVF infusing. Dilaudid used for pain management. Zofran used for nausea. Up ad dorothy. strengthening/mobility  - Encourage toileting schedule  Outcome: Progressing     Problem: DISCHARGE PLANNING  Goal: Discharge to home or other facility with appropriate resources  Description  INTERVENTIONS:  - Identify barriers to discharge w/pt and careg

## 2020-08-03 ENCOUNTER — HOSPITAL ENCOUNTER (EMERGENCY)
Facility: HOSPITAL | Age: 70
Discharge: HOME | End: 2020-08-03
Attending: EMERGENCY MEDICINE
Payer: COMMERCIAL

## 2020-08-03 ENCOUNTER — APPOINTMENT (OUTPATIENT)
Dept: CT IMAGING | Facility: HOSPITAL | Age: 70
End: 2020-08-03
Attending: EMERGENCY MEDICINE
Payer: COMMERCIAL

## 2020-08-03 VITALS
WEIGHT: 200 LBS | HEART RATE: 96 BPM | SYSTOLIC BLOOD PRESSURE: 141 MMHG | OXYGEN SATURATION: 97 % | DIASTOLIC BLOOD PRESSURE: 63 MMHG | TEMPERATURE: 97 F | BODY MASS INDEX: 30.31 KG/M2 | HEIGHT: 68 IN | RESPIRATION RATE: 18 BRPM

## 2020-08-03 DIAGNOSIS — R11.2 NAUSEA AND VOMITING, INTRACTABILITY OF VOMITING NOT SPECIFIED, UNSPECIFIED VOMITING TYPE: ICD-10-CM

## 2020-08-03 DIAGNOSIS — R10.84 ABDOMINAL PAIN, GENERALIZED: Primary | ICD-10-CM

## 2020-08-03 LAB
ALBUMIN SERPL-MCNC: 4 G/DL (ref 3.4–5)
ALBUMIN/GLOB SERPL: 0.9 {RATIO} (ref 1–2)
ALP LIVER SERPL-CCNC: 110 U/L (ref 45–117)
ALT SERPL-CCNC: 34 U/L (ref 16–61)
ANION GAP SERPL CALC-SCNC: 2 MMOL/L (ref 0–18)
AST SERPL-CCNC: 20 U/L (ref 15–37)
BASOPHILS # BLD AUTO: 0.03 X10(3) UL (ref 0–0.2)
BASOPHILS NFR BLD AUTO: 0.2 %
BILIRUB SERPL-MCNC: 0.2 MG/DL (ref 0.1–2)
BUN BLD-MCNC: 11 MG/DL (ref 7–18)
BUN/CREAT SERPL: 10.4 (ref 10–20)
CALCIUM BLD-MCNC: 8.9 MG/DL (ref 8.5–10.1)
CHLORIDE SERPL-SCNC: 113 MMOL/L (ref 98–112)
CO2 SERPL-SCNC: 24 MMOL/L (ref 21–32)
CREAT BLD-MCNC: 1.06 MG/DL (ref 0.7–1.3)
DEPRECATED RDW RBC AUTO: 45.1 FL (ref 35.1–46.3)
EOSINOPHIL # BLD AUTO: 0.01 X10(3) UL (ref 0–0.7)
EOSINOPHIL NFR BLD AUTO: 0.1 %
ERYTHROCYTE [DISTWIDTH] IN BLOOD BY AUTOMATED COUNT: 14.9 % (ref 11–15)
GLOBULIN PLAS-MCNC: 4.5 G/DL (ref 2.8–4.4)
GLUCOSE BLD-MCNC: 172 MG/DL (ref 70–99)
HCT VFR BLD AUTO: 41.6 % (ref 39–53)
HGB BLD-MCNC: 13.1 G/DL (ref 13–17.5)
IMM GRANULOCYTES # BLD AUTO: 0.09 X10(3) UL (ref 0–1)
IMM GRANULOCYTES NFR BLD: 0.6 %
LIPASE SERPL-CCNC: 114 U/L (ref 73–393)
LYMPHOCYTES # BLD AUTO: 1.62 X10(3) UL (ref 1–4)
LYMPHOCYTES NFR BLD AUTO: 10.2 %
M PROTEIN MFR SERPL ELPH: 8.5 G/DL (ref 6.4–8.2)
MCH RBC QN AUTO: 26.4 PG (ref 26–34)
MCHC RBC AUTO-ENTMCNC: 31.5 G/DL (ref 31–37)
MCV RBC AUTO: 83.7 FL (ref 80–100)
MONOCYTES # BLD AUTO: 0.8 X10(3) UL (ref 0.1–1)
MONOCYTES NFR BLD AUTO: 5.1 %
NEUTROPHILS # BLD AUTO: 13.26 X10 (3) UL (ref 1.5–7.7)
NEUTROPHILS # BLD AUTO: 13.26 X10(3) UL (ref 1.5–7.7)
NEUTROPHILS NFR BLD AUTO: 83.8 %
OSMOLALITY SERPL CALC.SUM OF ELEC: 291 MOSM/KG (ref 275–295)
PLATELET # BLD AUTO: 303 10(3)UL (ref 150–450)
POTASSIUM SERPL-SCNC: 3.7 MMOL/L (ref 3.5–5.1)
RBC # BLD AUTO: 4.97 X10(6)UL (ref 3.8–5.8)
SODIUM SERPL-SCNC: 139 MMOL/L (ref 136–145)
WBC # BLD AUTO: 15.8 X10(3) UL (ref 4–11)

## 2020-08-03 PROCEDURE — 99285 EMERGENCY DEPT VISIT HI MDM: CPT

## 2020-08-03 PROCEDURE — 85025 COMPLETE CBC W/AUTO DIFF WBC: CPT | Performed by: EMERGENCY MEDICINE

## 2020-08-03 PROCEDURE — 96376 TX/PRO/DX INJ SAME DRUG ADON: CPT

## 2020-08-03 PROCEDURE — 96361 HYDRATE IV INFUSION ADD-ON: CPT

## 2020-08-03 PROCEDURE — 74177 CT ABD & PELVIS W/CONTRAST: CPT | Performed by: EMERGENCY MEDICINE

## 2020-08-03 PROCEDURE — 96375 TX/PRO/DX INJ NEW DRUG ADDON: CPT

## 2020-08-03 PROCEDURE — 99284 EMERGENCY DEPT VISIT MOD MDM: CPT

## 2020-08-03 PROCEDURE — 83690 ASSAY OF LIPASE: CPT | Performed by: EMERGENCY MEDICINE

## 2020-08-03 PROCEDURE — 80053 COMPREHEN METABOLIC PANEL: CPT | Performed by: EMERGENCY MEDICINE

## 2020-08-03 PROCEDURE — 96374 THER/PROPH/DIAG INJ IV PUSH: CPT

## 2020-08-03 RX ORDER — HYDROMORPHONE HYDROCHLORIDE 1 MG/ML
1 INJECTION, SOLUTION INTRAMUSCULAR; INTRAVENOUS; SUBCUTANEOUS ONCE
Status: COMPLETED | OUTPATIENT
Start: 2020-08-03 | End: 2020-08-03

## 2020-08-03 RX ORDER — ONDANSETRON 8 MG/1
8 TABLET, ORALLY DISINTEGRATING ORAL EVERY 4 HOURS PRN
Qty: 10 TABLET | Refills: 0 | Status: SHIPPED | OUTPATIENT
Start: 2020-08-03 | End: 2020-08-10

## 2020-08-03 RX ORDER — ONDANSETRON 2 MG/ML
4 INJECTION INTRAMUSCULAR; INTRAVENOUS ONCE
Status: COMPLETED | OUTPATIENT
Start: 2020-08-03 | End: 2020-08-03

## 2020-08-03 RX ORDER — HYDROCODONE BITARTRATE AND ACETAMINOPHEN 5; 325 MG/1; MG/1
1-2 TABLET ORAL EVERY 6 HOURS PRN
Qty: 10 TABLET | Refills: 0 | Status: SHIPPED | OUTPATIENT
Start: 2020-08-03 | End: 2020-08-10

## 2020-08-03 RX ORDER — HYDROCODONE BITARTRATE AND ACETAMINOPHEN 5; 325 MG/1; MG/1
2 TABLET ORAL ONCE
Status: COMPLETED | OUTPATIENT
Start: 2020-08-03 | End: 2020-08-03

## 2020-08-03 RX ORDER — HYDROMORPHONE HYDROCHLORIDE 1 MG/ML
0.5 INJECTION, SOLUTION INTRAMUSCULAR; INTRAVENOUS; SUBCUTANEOUS ONCE
Status: COMPLETED | OUTPATIENT
Start: 2020-08-03 | End: 2020-08-03

## 2020-08-03 RX ORDER — METOCLOPRAMIDE HYDROCHLORIDE 5 MG/ML
10 INJECTION INTRAMUSCULAR; INTRAVENOUS ONCE
Status: COMPLETED | OUTPATIENT
Start: 2020-08-03 | End: 2020-08-03

## 2020-08-03 NOTE — ED PROVIDER NOTES
Patient Seen in: [facility] Emergency Department      History   Patient presents with:  Nausea/Vomiting/Diarrhea    Stated Complaint: Nausea and vomiting, loose BM since 6am    HPI    42-year-old male with a history of hypertension and remote histor anicteric. Conjunctivae show no pallor. Oropharynx clear, mucous membranes dry  Lungs: good air exchange   Heart: regular rate rhythm  Abdomen: Mild periumbilical tenderness with no hernia. No abdominal masses.   No peritoneal signs   Extremities: no melissa ([facility]) NRDR ([address]) which includes the Dose Index Registry. PATIENT STATED HISTORY:(As transcribed by Technologist)  Patient with left lower quadrant pain, nausea, vomiting and diarrhea today.    CONTRAST which should prompt immediate return. The patient expressed understanding of these instructions and agrees to the following plan provided. The patient was given written discharge instructions.   Patient agrees to return for any concerns and voiced underst

## 2020-08-04 NOTE — ED NOTES
Patient resting comfortably on cart, states feeling a little better and needs a little more time to rest before going home. MD aware.

## 2020-11-09 ENCOUNTER — HOSPITAL ENCOUNTER (EMERGENCY)
Facility: HOSPITAL | Age: 70
Discharge: HOME | End: 2020-11-09
Attending: EMERGENCY MEDICINE
Payer: COMMERCIAL

## 2020-11-09 VITALS
TEMPERATURE: 97 F | SYSTOLIC BLOOD PRESSURE: 163 MMHG | OXYGEN SATURATION: 98 % | BODY MASS INDEX: 30 KG/M2 | DIASTOLIC BLOOD PRESSURE: 72 MMHG | WEIGHT: 191.81 LBS | HEART RATE: 82 BPM | RESPIRATION RATE: 20 BRPM

## 2020-11-09 DIAGNOSIS — R11.15 CYCLICAL VOMITING: Primary | ICD-10-CM

## 2020-11-09 PROCEDURE — 80053 COMPREHEN METABOLIC PANEL: CPT | Performed by: PHYSICIAN ASSISTANT

## 2020-11-09 PROCEDURE — 99284 EMERGENCY DEPT VISIT MOD MDM: CPT

## 2020-11-09 PROCEDURE — 96375 TX/PRO/DX INJ NEW DRUG ADDON: CPT

## 2020-11-09 PROCEDURE — 83690 ASSAY OF LIPASE: CPT | Performed by: PHYSICIAN ASSISTANT

## 2020-11-09 PROCEDURE — 85025 COMPLETE CBC W/AUTO DIFF WBC: CPT | Performed by: PHYSICIAN ASSISTANT

## 2020-11-09 PROCEDURE — 96374 THER/PROPH/DIAG INJ IV PUSH: CPT

## 2020-11-09 PROCEDURE — 96361 HYDRATE IV INFUSION ADD-ON: CPT

## 2020-11-09 RX ORDER — DIPHENHYDRAMINE HYDROCHLORIDE 50 MG/ML
25 INJECTION INTRAMUSCULAR; INTRAVENOUS ONCE
Status: COMPLETED | OUTPATIENT
Start: 2020-11-09 | End: 2020-11-09

## 2020-11-09 RX ORDER — METOCLOPRAMIDE HYDROCHLORIDE 5 MG/ML
10 INJECTION INTRAMUSCULAR; INTRAVENOUS ONCE
Status: COMPLETED | OUTPATIENT
Start: 2020-11-09 | End: 2020-11-09

## 2020-11-09 RX ORDER — HALOPERIDOL 5 MG/ML
5 INJECTION INTRAMUSCULAR ONCE
Status: COMPLETED | OUTPATIENT
Start: 2020-11-09 | End: 2020-11-09

## 2020-11-09 RX ORDER — ONDANSETRON 2 MG/ML
4 INJECTION INTRAMUSCULAR; INTRAVENOUS ONCE
Status: COMPLETED | OUTPATIENT
Start: 2020-11-09 | End: 2020-11-09

## 2020-11-09 NOTE — ED NOTES
Pt d/c after no vomiting and him sleeping for 2hrs .   Pt requested more pain medication that the md declined to give

## 2020-11-09 NOTE — ED PROVIDER NOTES
Patient Seen in: [facility] Emergency Department      History   Patient presents with:  Nausea/Vomiting/Diarrhea    Stated Complaint: NVD    HPI    Mr. Renie Leyden is a pleasant 67-year-old male.   For the past 2 years, he has had episodic sudden onset s mass.  Neuro: Cranial nerves intact, Normal Gait    ED Course     Labs Reviewed   COMP METABOLIC PANEL (14) - Abnormal; Notable for the following components:       Result Value    Glucose 173 (*)     Total Protein 8.5 (*)     All other components within no waking up, he feels much improved. Patient educated on possible triggers for cyclical vomiting. He is now well-appearing with normal vital signs.              Disposition and Plan     Clinical Impression:  Cyclical vomiting  (primary encounter diagnosis

## 2020-11-09 NOTE — ED INITIAL ASSESSMENT (HPI)
Pt to ED with sudden onset of NVD this morning. Pt also c/o RUQ abd pain 9/10- he did receive 43mcg IN Fentanyl en route to ED bnow rating pain 8/10.

## 2020-11-11 ENCOUNTER — APPOINTMENT (OUTPATIENT)
Dept: CT IMAGING | Facility: HOSPITAL | Age: 70
End: 2020-11-11
Attending: EMERGENCY MEDICINE
Payer: COMMERCIAL

## 2020-11-11 ENCOUNTER — HOSPITAL ENCOUNTER (EMERGENCY)
Facility: HOSPITAL | Age: 70
Discharge: HOME | End: 2020-11-11
Attending: EMERGENCY MEDICINE
Payer: COMMERCIAL

## 2020-11-11 VITALS
HEART RATE: 65 BPM | OXYGEN SATURATION: 98 % | DIASTOLIC BLOOD PRESSURE: 84 MMHG | TEMPERATURE: 97 F | WEIGHT: 191.81 LBS | SYSTOLIC BLOOD PRESSURE: 201 MMHG | RESPIRATION RATE: 18 BRPM | BODY MASS INDEX: 30 KG/M2

## 2020-11-11 DIAGNOSIS — R10.9 ABDOMINAL PAIN OF UNKNOWN ETIOLOGY: Primary | ICD-10-CM

## 2020-11-11 PROCEDURE — 96361 HYDRATE IV INFUSION ADD-ON: CPT

## 2020-11-11 PROCEDURE — 99285 EMERGENCY DEPT VISIT HI MDM: CPT

## 2020-11-11 PROCEDURE — 96376 TX/PRO/DX INJ SAME DRUG ADON: CPT

## 2020-11-11 PROCEDURE — 82962 GLUCOSE BLOOD TEST: CPT

## 2020-11-11 PROCEDURE — 96374 THER/PROPH/DIAG INJ IV PUSH: CPT

## 2020-11-11 PROCEDURE — 99284 EMERGENCY DEPT VISIT MOD MDM: CPT

## 2020-11-11 PROCEDURE — 96375 TX/PRO/DX INJ NEW DRUG ADDON: CPT

## 2020-11-11 PROCEDURE — 83690 ASSAY OF LIPASE: CPT | Performed by: EMERGENCY MEDICINE

## 2020-11-11 PROCEDURE — 81001 URINALYSIS AUTO W/SCOPE: CPT | Performed by: EMERGENCY MEDICINE

## 2020-11-11 PROCEDURE — 85025 COMPLETE CBC W/AUTO DIFF WBC: CPT | Performed by: EMERGENCY MEDICINE

## 2020-11-11 PROCEDURE — 80053 COMPREHEN METABOLIC PANEL: CPT | Performed by: EMERGENCY MEDICINE

## 2020-11-11 PROCEDURE — 74177 CT ABD & PELVIS W/CONTRAST: CPT | Performed by: EMERGENCY MEDICINE

## 2020-11-11 RX ORDER — HYDROMORPHONE HYDROCHLORIDE 1 MG/ML
0.5 INJECTION, SOLUTION INTRAMUSCULAR; INTRAVENOUS; SUBCUTANEOUS ONCE
Status: DISCONTINUED | OUTPATIENT
Start: 2020-11-11 | End: 2020-11-11

## 2020-11-11 RX ORDER — ONDANSETRON 2 MG/ML
4 INJECTION INTRAMUSCULAR; INTRAVENOUS ONCE
Status: DISCONTINUED | OUTPATIENT
Start: 2020-11-11 | End: 2020-11-11

## 2020-11-11 RX ORDER — HYDROMORPHONE HYDROCHLORIDE 1 MG/ML
1 INJECTION, SOLUTION INTRAMUSCULAR; INTRAVENOUS; SUBCUTANEOUS EVERY 30 MIN PRN
Status: DISCONTINUED | OUTPATIENT
Start: 2020-11-11 | End: 2020-11-11

## 2020-11-11 RX ORDER — HYOSCYAMINE SULFATE 0.125 MG
125 TABLET ORAL EVERY 4 HOURS PRN
Qty: 15 TABLET | Refills: 0 | Status: SHIPPED | OUTPATIENT
Start: 2020-11-11 | End: 2020-11-16

## 2020-11-11 RX ORDER — SODIUM CHLORIDE 9 MG/ML
INJECTION, SOLUTION INTRAVENOUS CONTINUOUS
Status: DISCONTINUED | OUTPATIENT
Start: 2020-11-11 | End: 2020-11-11

## 2020-11-11 RX ORDER — ONDANSETRON 2 MG/ML
4 INJECTION INTRAMUSCULAR; INTRAVENOUS ONCE
Status: COMPLETED | OUTPATIENT
Start: 2020-11-11 | End: 2020-11-11

## 2020-11-11 RX ORDER — AMLODIPINE BESYLATE 5 MG/1
5 TABLET ORAL DAILY
Status: DISCONTINUED | OUTPATIENT
Start: 2020-11-11 | End: 2020-11-11

## 2020-11-11 NOTE — ED PROVIDER NOTES
Patient Seen in: [facility] Emergency Department      History   Patient presents with:  Abdomen/Flank Pain: see yesterday for same issue.  dx with diverticulitisi    Stated Complaint: abdominal pain    HPI    78-year-old -American male present Temp 97.1 °F (36.2 °C)   Temp src Temporal   SpO2 97 %   O2 Device None (Room air)       Current:BP (!) 201/84   Pulse 65   Temp 97.1 °F (36.2 °C) (Temporal)   Resp 18   Wt 87 kg   SpO2 98%   BMI 30.04 kg/m²         Physical Exam    Well-developed well-n 1.11 (*)     All other components within normal limits   CBC WITH DIFFERENTIAL WITH PLATELET    Narrative: The following orders were created for panel order CBC WITH DIFFERENTIAL WITH PLATELET.   Procedure                               Abnormality lower lobes which is most likely atelectasis. OTHER:  Negative.                             Impression     CONCLUSION:     1. Specific etiology for abdominal pain is not evident.  There is no significant change as compared to previous studies.    2. Karen time on file for this visit.     Follow-up:  Sade Rios, [address] [facility]  [phone]    In 2 days            Medications Prescribed:  Current Discharge Medication List    START taking these medications    Hyoscyamine Sulfate

## 2020-11-11 NOTE — ED NOTES
Patient is resting comfortably. Aware we need urine, instructed to press call light when he has a sample.

## 2020-11-11 NOTE — ED INITIAL ASSESSMENT (HPI)
Pt was seen yesterday in our ED for abd pain. Dx with diverticulitis, back to ED with rpts of 9/10 pain. Denies n/v/d/f. Did not take morning meds.

## 2020-11-16 ENCOUNTER — HOSPITAL ENCOUNTER (EMERGENCY)
Facility: HOSPITAL | Age: 70
Discharge: HOME | End: 2020-11-16
Attending: EMERGENCY MEDICINE
Payer: COMMERCIAL

## 2020-11-16 ENCOUNTER — APPOINTMENT (OUTPATIENT)
Dept: GENERAL RADIOLOGY | Facility: HOSPITAL | Age: 70
End: 2020-11-16
Attending: EMERGENCY MEDICINE
Payer: COMMERCIAL

## 2020-11-16 ENCOUNTER — APPOINTMENT (OUTPATIENT)
Dept: CT IMAGING | Facility: HOSPITAL | Age: 70
End: 2020-11-16
Attending: EMERGENCY MEDICINE
Payer: COMMERCIAL

## 2020-11-16 VITALS
BODY MASS INDEX: 28.79 KG/M2 | HEART RATE: 78 BPM | DIASTOLIC BLOOD PRESSURE: 74 MMHG | WEIGHT: 190 LBS | OXYGEN SATURATION: 98 % | HEIGHT: 68 IN | RESPIRATION RATE: 16 BRPM | TEMPERATURE: 99 F | SYSTOLIC BLOOD PRESSURE: 143 MMHG

## 2020-11-16 DIAGNOSIS — R10.9 ABDOMINAL PAIN OF UNKNOWN ETIOLOGY: Primary | ICD-10-CM

## 2020-11-16 DIAGNOSIS — R11.2 NAUSEA AND VOMITING IN ADULT: ICD-10-CM

## 2020-11-16 DIAGNOSIS — Z20.822 COVID-19 VIRUS TEST RESULT UNKNOWN: ICD-10-CM

## 2020-11-16 PROCEDURE — 81001 URINALYSIS AUTO W/SCOPE: CPT | Performed by: EMERGENCY MEDICINE

## 2020-11-16 PROCEDURE — 96361 HYDRATE IV INFUSION ADD-ON: CPT

## 2020-11-16 PROCEDURE — 80053 COMPREHEN METABOLIC PANEL: CPT | Performed by: EMERGENCY MEDICINE

## 2020-11-16 PROCEDURE — 99285 EMERGENCY DEPT VISIT HI MDM: CPT

## 2020-11-16 PROCEDURE — 71045 X-RAY EXAM CHEST 1 VIEW: CPT | Performed by: EMERGENCY MEDICINE

## 2020-11-16 PROCEDURE — 84484 ASSAY OF TROPONIN QUANT: CPT | Performed by: EMERGENCY MEDICINE

## 2020-11-16 PROCEDURE — 85025 COMPLETE CBC W/AUTO DIFF WBC: CPT | Performed by: EMERGENCY MEDICINE

## 2020-11-16 PROCEDURE — 74176 CT ABD & PELVIS W/O CONTRAST: CPT | Performed by: EMERGENCY MEDICINE

## 2020-11-16 PROCEDURE — 93005 ELECTROCARDIOGRAM TRACING: CPT

## 2020-11-16 PROCEDURE — 93010 ELECTROCARDIOGRAM REPORT: CPT

## 2020-11-16 PROCEDURE — 96374 THER/PROPH/DIAG INJ IV PUSH: CPT

## 2020-11-16 PROCEDURE — 83690 ASSAY OF LIPASE: CPT | Performed by: EMERGENCY MEDICINE

## 2020-11-16 PROCEDURE — 96375 TX/PRO/DX INJ NEW DRUG ADDON: CPT

## 2020-11-16 RX ORDER — POTASSIUM CHLORIDE 20 MEQ/1
20 TABLET, EXTENDED RELEASE ORAL ONCE
Status: COMPLETED | OUTPATIENT
Start: 2020-11-16 | End: 2020-11-16

## 2020-11-16 RX ORDER — HYDROMORPHONE HYDROCHLORIDE 1 MG/ML
0.5 INJECTION, SOLUTION INTRAMUSCULAR; INTRAVENOUS; SUBCUTANEOUS ONCE
Status: COMPLETED | OUTPATIENT
Start: 2020-11-16 | End: 2020-11-16

## 2020-11-16 RX ORDER — ONDANSETRON 4 MG/1
4 TABLET, ORALLY DISINTEGRATING ORAL EVERY 4 HOURS PRN
Qty: 10 TABLET | Refills: 0 | Status: SHIPPED | OUTPATIENT
Start: 2020-11-16 | End: 2020-11-23

## 2020-11-16 RX ORDER — PANTOPRAZOLE SODIUM 40 MG/1
40 TABLET, DELAYED RELEASE ORAL DAILY
Qty: 30 TABLET | Refills: 0 | Status: SHIPPED | OUTPATIENT
Start: 2020-11-16 | End: 2020-12-16

## 2020-11-16 RX ORDER — ONDANSETRON 2 MG/ML
4 INJECTION INTRAMUSCULAR; INTRAVENOUS ONCE
Status: COMPLETED | OUTPATIENT
Start: 2020-11-16 | End: 2020-11-16

## 2020-11-16 RX ORDER — ONDANSETRON 2 MG/ML
4 INJECTION INTRAMUSCULAR; INTRAVENOUS ONCE
Status: DISCONTINUED | OUTPATIENT
Start: 2020-11-16 | End: 2020-11-16

## 2020-11-16 NOTE — ED NOTES
Pt was given water and crackers. Pt denies nausea at this time. RN informed patient need of urine sample. Pt verbalized understanding.

## 2020-11-16 NOTE — ED PROVIDER NOTES
Patient Seen in: [facility] Emergency Department      History   Patient presents with:  Abdomen/Flank Pain    Stated Complaint: abd pain, anorexia, fatigue x couple weeks    HPI    This is a 81-year-old male who arrives here with complaints of abdom 0925 97   Resp 11/16/20 0925 16   Temp 11/16/20 0934 98.5 °F (36.9 °C)   Temp src 11/16/20 0934 Temporal   SpO2 11/16/20 0934 100 %   O2 Device 11/16/20 0925 None (Room air)       Current:/74   Pulse 78   Temp 98.5 °F (36.9 °C) (Temporal)   Resp 16 within normal limits   LIPASE - Normal   TROPONIN I - Normal   CBC WITH DIFFERENTIAL WITH PLATELET    Narrative: The following orders were created for panel order CBC WITH DIFFERENTIAL WITH PLATELET.   Procedure                               Abnormality small left lower lobe calcified granuloma  LIVER:  Small liver calcification. No liver enlargement. BILIARY:  Stable plaque-like calcification along the wall of the gallbladder inferomedially, without gallbladder dilation or sign of cholecystitis.   PANCR NRDR ([address]) which includes the Dose Index Registry.   PATIENT STATED HISTORY:(As transcribed by Technologist)    CONTRAST USED:   FINDINGS:  LIVER:  Subtle round hypoenhancing focus inferior tip right hepatic lobe seen on image 4 lobe is in retrospect stable as compared to multiple previous studies and most likely a benign perfusional variant.     Dictated by (CST): Xiomara Restrepo MD on 11/11/2020 at 9:29 AM     Finalized by (CST): Xiomara Restrepo MD on 11/11/2020 at 9:42 AM       Xr C him.       The patient did get a Covid test which I discussed him should be followed up he should be self quarantine until the results are determined.     Prior to his discharge he was able to tolerate p.o. fluids he had no abdominal pain he want to go home

## 2020-11-20 ENCOUNTER — HOSPITAL ENCOUNTER (EMERGENCY)
Facility: HOSPITAL | Age: 70
Discharge: HOME | End: 2020-11-20
Attending: EMERGENCY MEDICINE
Payer: COMMERCIAL

## 2020-11-20 VITALS
OXYGEN SATURATION: 100 % | DIASTOLIC BLOOD PRESSURE: 75 MMHG | WEIGHT: 188.94 LBS | RESPIRATION RATE: 21 BRPM | SYSTOLIC BLOOD PRESSURE: 145 MMHG | TEMPERATURE: 98 F | BODY MASS INDEX: 28.63 KG/M2 | HEART RATE: 97 BPM | HEIGHT: 68 IN

## 2020-11-20 DIAGNOSIS — R11.2 NAUSEA AND VOMITING IN ADULT: Primary | ICD-10-CM

## 2020-11-20 PROCEDURE — 83690 ASSAY OF LIPASE: CPT | Performed by: EMERGENCY MEDICINE

## 2020-11-20 PROCEDURE — 99285 EMERGENCY DEPT VISIT HI MDM: CPT

## 2020-11-20 PROCEDURE — 85025 COMPLETE CBC W/AUTO DIFF WBC: CPT | Performed by: EMERGENCY MEDICINE

## 2020-11-20 PROCEDURE — 80053 COMPREHEN METABOLIC PANEL: CPT | Performed by: EMERGENCY MEDICINE

## 2020-11-20 PROCEDURE — 96375 TX/PRO/DX INJ NEW DRUG ADDON: CPT

## 2020-11-20 PROCEDURE — 81003 URINALYSIS AUTO W/O SCOPE: CPT | Performed by: EMERGENCY MEDICINE

## 2020-11-20 PROCEDURE — 99284 EMERGENCY DEPT VISIT MOD MDM: CPT

## 2020-11-20 PROCEDURE — 96372 THER/PROPH/DIAG INJ SC/IM: CPT

## 2020-11-20 PROCEDURE — 80307 DRUG TEST PRSMV CHEM ANLYZR: CPT | Performed by: EMERGENCY MEDICINE

## 2020-11-20 PROCEDURE — 96374 THER/PROPH/DIAG INJ IV PUSH: CPT

## 2020-11-20 PROCEDURE — 96361 HYDRATE IV INFUSION ADD-ON: CPT

## 2020-11-20 RX ORDER — SODIUM CHLORIDE 9 MG/ML
INJECTION, SOLUTION INTRAVENOUS CONTINUOUS
Status: DISCONTINUED | OUTPATIENT
Start: 2020-11-20 | End: 2020-11-20

## 2020-11-20 RX ORDER — DIPHENHYDRAMINE HYDROCHLORIDE 50 MG/ML
50 INJECTION INTRAMUSCULAR; INTRAVENOUS ONCE
Status: COMPLETED | OUTPATIENT
Start: 2020-11-20 | End: 2020-11-20

## 2020-11-20 RX ORDER — METOCLOPRAMIDE HYDROCHLORIDE 5 MG/ML
10 INJECTION INTRAMUSCULAR; INTRAVENOUS ONCE
Status: COMPLETED | OUTPATIENT
Start: 2020-11-20 | End: 2020-11-20

## 2020-11-20 RX ORDER — ONDANSETRON 8 MG/1
8 TABLET, ORALLY DISINTEGRATING ORAL EVERY 6 HOURS PRN
Qty: 20 TABLET | Refills: 0 | Status: SHIPPED | OUTPATIENT
Start: 2020-11-20 | End: 2021-03-16 | Stop reason: ALTCHOICE

## 2020-11-20 RX ORDER — DICYCLOMINE HYDROCHLORIDE 10 MG/ML
20 INJECTION INTRAMUSCULAR ONCE
Status: COMPLETED | OUTPATIENT
Start: 2020-11-20 | End: 2020-11-20

## 2020-11-20 RX ORDER — ONDANSETRON 2 MG/ML
4 INJECTION INTRAMUSCULAR; INTRAVENOUS ONCE
Status: COMPLETED | OUTPATIENT
Start: 2020-11-20 | End: 2020-11-20

## 2020-11-20 RX ORDER — METOCLOPRAMIDE 10 MG/1
10 TABLET ORAL EVERY 6 HOURS PRN
Qty: 20 TABLET | Refills: 0 | Status: SHIPPED | OUTPATIENT
Start: 2020-11-20 | End: 2021-03-16 | Stop reason: ALTCHOICE

## 2020-11-20 RX ORDER — SODIUM CHLORIDE 9 MG/ML
1000 INJECTION, SOLUTION INTRAVENOUS ONCE
Status: COMPLETED | OUTPATIENT
Start: 2020-11-20 | End: 2020-11-20

## 2020-11-20 NOTE — ED INITIAL ASSESSMENT (HPI)
Patient arrived via EMS from home. Per patient he ate pizza last night and has been having GI upset including nausea and vomiting and abdomen pain. Given 4mg of Zofran by EMS. A&Ox4.  Able to ambulate without issue

## 2020-11-20 NOTE — ED PROVIDER NOTES
Patient Seen in: [facility] Emergency Department      History   Patient presents with:  Nausea/Vomiting/Diarrhea    Stated Complaint: Nausea and vomiting    HPI    Patient was to the emergency department 4 days ago.   He was complaining of abdominal Procedure Laterality Date   • COLONOSCOPY N/A 6/16/2019    Performed by Vipul Martino MD at [address] [place] [address] ENDOSCOPY   • ESOPHAGOGASTRODUODENOSCOPY (EGD) N/A 6/16/2019    Performed by Vipul Martino MD at [address] [place] [address] ENDOSCOPY   • ESOPHAGOGASTRODUODENOSCOPY (EGD) N/A 3/ Labs Reviewed   COMP METABOLIC PANEL (14) - Abnormal; Notable for the following components:       Result Value    Glucose 165 (*)     Calculated Osmolality 297 (*)     GFR, Non- 57 (*)     A/G Ratio 0.9 (*)     All other components with Nonspecific increased density at the root of the mesentery which is a nonspecific indicator of previous inflammation and is unchanged.   6. Small hypoenhancing nodule inferior tip right hepatic lobe is in retrospect stable as compared to multiple previous s leukocytes  Urine drug screen      On repeat examination, patient was laughing and joking with me giving me a thumbs up. He was feeling much better. I strongly recommended marijuana smoking cessation.   I will discharge home on nausea medicine to use arou

## 2021-03-22 ENCOUNTER — APPOINTMENT (OUTPATIENT)
Dept: CT IMAGING | Facility: HOSPITAL | Age: 71
End: 2021-03-22
Attending: EMERGENCY MEDICINE
Payer: COMMERCIAL

## 2021-03-22 ENCOUNTER — HOSPITAL ENCOUNTER (EMERGENCY)
Facility: HOSPITAL | Age: 71
Discharge: HOME | End: 2021-03-22
Attending: EMERGENCY MEDICINE
Payer: COMMERCIAL

## 2021-03-22 VITALS
WEIGHT: 190 LBS | RESPIRATION RATE: 17 BRPM | HEART RATE: 86 BPM | OXYGEN SATURATION: 96 % | SYSTOLIC BLOOD PRESSURE: 143 MMHG | TEMPERATURE: 98 F | DIASTOLIC BLOOD PRESSURE: 58 MMHG | BODY MASS INDEX: 29 KG/M2

## 2021-03-22 DIAGNOSIS — K52.9 COLITIS: ICD-10-CM

## 2021-03-22 DIAGNOSIS — K52.9 ACUTE COLITIS: Primary | ICD-10-CM

## 2021-03-22 LAB
ALBUMIN SERPL-MCNC: 3.8 G/DL (ref 3.4–5)
ALBUMIN/GLOB SERPL: 0.8 {RATIO} (ref 1–2)
ALP LIVER SERPL-CCNC: 112 U/L
ALT SERPL-CCNC: 40 U/L
ANION GAP SERPL CALC-SCNC: 3 MMOL/L (ref 0–18)
BASOPHILS # BLD AUTO: 0.05 X10(3) UL (ref 0–0.2)
BASOPHILS NFR BLD AUTO: 0.4 %
BILIRUB SERPL-MCNC: 0.4 MG/DL (ref 0.1–2)
BILIRUB UR QL STRIP.AUTO: NEGATIVE
BUN BLD-MCNC: 11 MG/DL (ref 7–18)
BUN/CREAT SERPL: 11.6 (ref 10–20)
CALCIUM BLD-MCNC: 9.1 MG/DL (ref 8.5–10.1)
CHLORIDE SERPL-SCNC: 111 MMOL/L (ref 98–112)
CLARITY UR REFRACT.AUTO: CLEAR
CO2 SERPL-SCNC: 23 MMOL/L (ref 21–32)
COLOR UR AUTO: YELLOW
CREAT BLD-MCNC: 0.95 MG/DL
DEPRECATED RDW RBC AUTO: 44.8 FL (ref 35.1–46.3)
EOSINOPHIL # BLD AUTO: 0.02 X10(3) UL (ref 0–0.7)
EOSINOPHIL NFR BLD AUTO: 0.2 %
ERYTHROCYTE [DISTWIDTH] IN BLOOD BY AUTOMATED COUNT: 14.7 % (ref 11–15)
GLOBULIN PLAS-MCNC: 4.9 G/DL (ref 2.8–4.4)
GLUCOSE BLD-MCNC: 153 MG/DL (ref 70–99)
GLUCOSE UR STRIP.AUTO-MCNC: 50 MG/DL
HCT VFR BLD AUTO: 42.9 %
HGB BLD-MCNC: 13.7 G/DL
IMM GRANULOCYTES # BLD AUTO: 0.04 X10(3) UL (ref 0–1)
IMM GRANULOCYTES NFR BLD: 0.3 %
KETONES UR STRIP.AUTO-MCNC: 20 MG/DL
LEUKOCYTE ESTERASE UR QL STRIP.AUTO: NEGATIVE
LIPASE SERPL-CCNC: 35 U/L (ref 73–393)
LYMPHOCYTES # BLD AUTO: 1.89 X10(3) UL (ref 1–4)
LYMPHOCYTES NFR BLD AUTO: 14.2 %
M PROTEIN MFR SERPL ELPH: 8.7 G/DL (ref 6.4–8.2)
MCH RBC QN AUTO: 27.2 PG (ref 26–34)
MCHC RBC AUTO-ENTMCNC: 31.9 G/DL (ref 31–37)
MCV RBC AUTO: 85.1 FL
MONOCYTES # BLD AUTO: 0.75 X10(3) UL (ref 0.1–1)
MONOCYTES NFR BLD AUTO: 5.7 %
NEUTROPHILS # BLD AUTO: 10.52 X10 (3) UL (ref 1.5–7.7)
NEUTROPHILS # BLD AUTO: 10.52 X10(3) UL (ref 1.5–7.7)
NEUTROPHILS NFR BLD AUTO: 79.2 %
NITRITE UR QL STRIP.AUTO: NEGATIVE
OSMOLALITY SERPL CALC.SUM OF ELEC: 286 MOSM/KG (ref 275–295)
PH UR STRIP.AUTO: 6 [PH] (ref 5–8)
PLATELET # BLD AUTO: 318 10(3)UL (ref 150–450)
PROT UR STRIP.AUTO-MCNC: NEGATIVE MG/DL
RBC # BLD AUTO: 5.04 X10(6)UL
RBC UR QL AUTO: NEGATIVE
SARS-COV-2 RNA RESP QL NAA+PROBE: NOT DETECTED
SODIUM SERPL-SCNC: 137 MMOL/L (ref 136–145)
SP GR UR STRIP.AUTO: 1.01 (ref 1–1.03)
UROBILINOGEN UR STRIP.AUTO-MCNC: <2 MG/DL
WBC # BLD AUTO: 13.3 X10(3) UL (ref 4–11)

## 2021-03-22 PROCEDURE — 96375 TX/PRO/DX INJ NEW DRUG ADDON: CPT

## 2021-03-22 PROCEDURE — 85025 COMPLETE CBC W/AUTO DIFF WBC: CPT | Performed by: EMERGENCY MEDICINE

## 2021-03-22 PROCEDURE — 80053 COMPREHEN METABOLIC PANEL: CPT | Performed by: EMERGENCY MEDICINE

## 2021-03-22 PROCEDURE — 99284 EMERGENCY DEPT VISIT MOD MDM: CPT

## 2021-03-22 PROCEDURE — 96374 THER/PROPH/DIAG INJ IV PUSH: CPT

## 2021-03-22 PROCEDURE — 74177 CT ABD & PELVIS W/CONTRAST: CPT | Performed by: EMERGENCY MEDICINE

## 2021-03-22 PROCEDURE — 83690 ASSAY OF LIPASE: CPT | Performed by: EMERGENCY MEDICINE

## 2021-03-22 PROCEDURE — 81003 URINALYSIS AUTO W/O SCOPE: CPT | Performed by: EMERGENCY MEDICINE

## 2021-03-22 PROCEDURE — 96361 HYDRATE IV INFUSION ADD-ON: CPT

## 2021-03-22 RX ORDER — METOCLOPRAMIDE HYDROCHLORIDE 5 MG/ML
10 INJECTION INTRAMUSCULAR; INTRAVENOUS ONCE
Status: COMPLETED | OUTPATIENT
Start: 2021-03-22 | End: 2021-03-22

## 2021-03-22 RX ORDER — DIPHENHYDRAMINE HYDROCHLORIDE 50 MG/ML
25 INJECTION INTRAMUSCULAR; INTRAVENOUS ONCE
Status: COMPLETED | OUTPATIENT
Start: 2021-03-22 | End: 2021-03-22

## 2021-03-22 RX ORDER — ONDANSETRON 2 MG/ML
INJECTION INTRAMUSCULAR; INTRAVENOUS
Status: COMPLETED
Start: 2021-03-22 | End: 2021-03-22

## 2021-03-22 NOTE — ED INITIAL ASSESSMENT (HPI)
Pt received second covid vaccination 3 days ago; pt states he woke up today with body aches and fatigue. Pt given zofran en route by EMS.

## 2021-03-22 NOTE — ED PROVIDER NOTES
Patient Seen in: [facility] Emergency Department      History   Patient presents with:   Body ache and/or chills    Stated Complaint: body aches    HPI/Subjective:   HPI    Patient is a 71-year-old male who presents the emergency room with nausea and °C)   Temp src Oral   SpO2 98 %   O2 Device None (Room air)       Current:/58   Pulse 81   Temp 98.3 °F (36.8 °C) (Oral)   Resp 17   Wt 86.2 kg   SpO2 96%   BMI 28.89 kg/m²         Physical Exam    General: Patient is resting comfortably in no acute Abnormal            Final result                 Please view results for these tests on the individual orders. REDRAW AST (SGOT) (P)   REDRAW POTASSIUM (P)          CT and pelvis:  Bowel wall thickening in the transverse colon possible colitis versus lac

## 2021-05-09 NOTE — PROGRESS NOTES
[facility] [address]  [place], [address]  ?  03/04/18  ? Re: Elijio Habermann  ? To Whom It May Concern:    Elijio Habermann was admitted to [facility] from 3/1/2018 to 03/04/18.     Please excuse Elijio Habermann from attending work [FreeTextEntry1] : History of B12 deficiency and iron deficiency, patient is on B12 supplementation  and has received IV iron in the past.\par --CBC today, iron studies, B12 level today\par --We will determine if she will require iron supplementation with Venofer.\par --Follow up with PMD for high blood pressure. \par -- On 4/20/21 Patient will start Venofer Infusion weekly X5.\par --She will Continue with B12 supplement \par --She will follow up with her PMD and undergo age appropriate screening. \par \par She will follow up with Dr Valentin in 3 months. \par Patient seen and examined by Dr Valentin who agreed for the above plan.

## 2021-07-15 ENCOUNTER — HOSPITAL ENCOUNTER (INPATIENT)
Facility: HOSPITAL | Age: 71
LOS: 4 days | Discharge: HOME | DRG: 394 | End: 2021-07-19
Attending: EMERGENCY MEDICINE | Admitting: HOSPITALIST
Payer: COMMERCIAL

## 2021-07-15 DIAGNOSIS — R11.2 INTRACTABLE VOMITING WITH NAUSEA, UNSPECIFIED VOMITING TYPE: Primary | ICD-10-CM

## 2021-07-15 LAB
ALBUMIN SERPL-MCNC: 4.6 G/DL (ref 3.4–5)
ALBUMIN/GLOB SERPL: 1 {RATIO} (ref 1–2)
ALP LIVER SERPL-CCNC: 123 U/L
ALT SERPL-CCNC: 34 U/L
ANION GAP SERPL CALC-SCNC: 6 MMOL/L (ref 0–18)
AST SERPL-CCNC: 26 U/L (ref 15–37)
BASOPHILS # BLD AUTO: 0.05 X10(3) UL (ref 0–0.2)
BASOPHILS NFR BLD AUTO: 0.3 %
BILIRUB SERPL-MCNC: 0.4 MG/DL (ref 0.1–2)
BUN BLD-MCNC: 9 MG/DL (ref 7–18)
BUN/CREAT SERPL: 7.9 (ref 10–20)
CALCIUM BLD-MCNC: 9.7 MG/DL (ref 8.5–10.1)
CHLORIDE SERPL-SCNC: 109 MMOL/L (ref 98–112)
CO2 SERPL-SCNC: 23 MMOL/L (ref 21–32)
CREAT BLD-MCNC: 1.14 MG/DL
DEPRECATED RDW RBC AUTO: 43.4 FL (ref 35.1–46.3)
EOSINOPHIL # BLD AUTO: 0 X10(3) UL (ref 0–0.7)
EOSINOPHIL NFR BLD AUTO: 0 %
ERYTHROCYTE [DISTWIDTH] IN BLOOD BY AUTOMATED COUNT: 14.6 % (ref 11–15)
GLOBULIN PLAS-MCNC: 4.8 G/DL (ref 2.8–4.4)
GLUCOSE BLD-MCNC: 206 MG/DL (ref 70–99)
HCT VFR BLD AUTO: 43.2 %
HGB BLD-MCNC: 14.1 G/DL
IMM GRANULOCYTES # BLD AUTO: 0.06 X10(3) UL (ref 0–1)
IMM GRANULOCYTES NFR BLD: 0.4 %
LIPASE SERPL-CCNC: 60 U/L (ref 73–393)
LYMPHOCYTES # BLD AUTO: 2.1 X10(3) UL (ref 1–4)
LYMPHOCYTES NFR BLD AUTO: 12.8 %
M PROTEIN MFR SERPL ELPH: 9.4 G/DL (ref 6.4–8.2)
MCH RBC QN AUTO: 26.9 PG (ref 26–34)
MCHC RBC AUTO-ENTMCNC: 32.6 G/DL (ref 31–37)
MCV RBC AUTO: 82.4 FL
MONOCYTES # BLD AUTO: 0.89 X10(3) UL (ref 0.1–1)
MONOCYTES NFR BLD AUTO: 5.4 %
NEUTROPHILS # BLD AUTO: 13.34 X10 (3) UL (ref 1.5–7.7)
NEUTROPHILS # BLD AUTO: 13.34 X10(3) UL (ref 1.5–7.7)
NEUTROPHILS NFR BLD AUTO: 81.1 %
OSMOLALITY SERPL CALC.SUM OF ELEC: 291 MOSM/KG (ref 275–295)
PLATELET # BLD AUTO: 339 10(3)UL (ref 150–450)
POTASSIUM SERPL-SCNC: 4.4 MMOL/L (ref 3.5–5.1)
RBC # BLD AUTO: 5.24 X10(6)UL
SODIUM SERPL-SCNC: 138 MMOL/L (ref 136–145)
WBC # BLD AUTO: 16.4 X10(3) UL (ref 4–11)

## 2021-07-15 PROCEDURE — 96361 HYDRATE IV INFUSION ADD-ON: CPT

## 2021-07-15 PROCEDURE — 85025 COMPLETE CBC W/AUTO DIFF WBC: CPT | Performed by: EMERGENCY MEDICINE

## 2021-07-15 PROCEDURE — 96374 THER/PROPH/DIAG INJ IV PUSH: CPT

## 2021-07-15 PROCEDURE — 96375 TX/PRO/DX INJ NEW DRUG ADDON: CPT

## 2021-07-15 PROCEDURE — 99285 EMERGENCY DEPT VISIT HI MDM: CPT

## 2021-07-15 PROCEDURE — 93005 ELECTROCARDIOGRAM TRACING: CPT

## 2021-07-15 PROCEDURE — 96376 TX/PRO/DX INJ SAME DRUG ADON: CPT

## 2021-07-15 PROCEDURE — 83690 ASSAY OF LIPASE: CPT | Performed by: EMERGENCY MEDICINE

## 2021-07-15 PROCEDURE — 80053 COMPREHEN METABOLIC PANEL: CPT | Performed by: EMERGENCY MEDICINE

## 2021-07-15 PROCEDURE — 93010 ELECTROCARDIOGRAM REPORT: CPT

## 2021-07-15 RX ORDER — DIPHENHYDRAMINE HYDROCHLORIDE 50 MG/ML
25 INJECTION INTRAMUSCULAR; INTRAVENOUS ONCE
Status: COMPLETED | OUTPATIENT
Start: 2021-07-15 | End: 2021-07-15

## 2021-07-15 RX ORDER — HALOPERIDOL 5 MG/ML
INJECTION INTRAMUSCULAR
Status: COMPLETED
Start: 2021-07-15 | End: 2021-07-15

## 2021-07-15 RX ORDER — ACETAMINOPHEN 10 MG/ML
1000 INJECTION, SOLUTION INTRAVENOUS EVERY 6 HOURS PRN
Status: DISCONTINUED | OUTPATIENT
Start: 2021-07-15 | End: 2021-07-19

## 2021-07-15 RX ORDER — ONDANSETRON 2 MG/ML
4 INJECTION INTRAMUSCULAR; INTRAVENOUS ONCE
Status: COMPLETED | OUTPATIENT
Start: 2021-07-15 | End: 2021-07-15

## 2021-07-15 RX ORDER — METOCLOPRAMIDE HYDROCHLORIDE 5 MG/ML
10 INJECTION INTRAMUSCULAR; INTRAVENOUS EVERY 8 HOURS PRN
Status: DISCONTINUED | OUTPATIENT
Start: 2021-07-15 | End: 2021-07-19

## 2021-07-15 RX ORDER — SODIUM CHLORIDE 9 MG/ML
INJECTION, SOLUTION INTRAVENOUS CONTINUOUS
Status: DISCONTINUED | OUTPATIENT
Start: 2021-07-15 | End: 2021-07-19

## 2021-07-15 RX ORDER — LORAZEPAM 2 MG/ML
0.5 INJECTION INTRAMUSCULAR EVERY 6 HOURS PRN
Status: DISCONTINUED | OUTPATIENT
Start: 2021-07-15 | End: 2021-07-19

## 2021-07-15 RX ORDER — PROCHLORPERAZINE EDISYLATE 5 MG/ML
10 INJECTION INTRAMUSCULAR; INTRAVENOUS ONCE
Status: COMPLETED | OUTPATIENT
Start: 2021-07-15 | End: 2021-07-15

## 2021-07-15 RX ORDER — KETOROLAC TROMETHAMINE 15 MG/ML
15 INJECTION, SOLUTION INTRAMUSCULAR; INTRAVENOUS EVERY 6 HOURS PRN
Status: DISCONTINUED | OUTPATIENT
Start: 2021-07-15 | End: 2021-07-16

## 2021-07-15 RX ORDER — ACETAMINOPHEN 325 MG/1
650 TABLET ORAL EVERY 6 HOURS PRN
Status: DISCONTINUED | OUTPATIENT
Start: 2021-07-15 | End: 2021-07-15

## 2021-07-15 RX ORDER — LORAZEPAM 2 MG/ML
1 INJECTION INTRAMUSCULAR EVERY 6 HOURS PRN
Status: DISCONTINUED | OUTPATIENT
Start: 2021-07-15 | End: 2021-07-19

## 2021-07-15 RX ORDER — ONDANSETRON 2 MG/ML
4 INJECTION INTRAMUSCULAR; INTRAVENOUS EVERY 6 HOURS PRN
Status: DISCONTINUED | OUTPATIENT
Start: 2021-07-15 | End: 2021-07-19

## 2021-07-15 RX ORDER — KETOROLAC TROMETHAMINE 30 MG/ML
30 INJECTION, SOLUTION INTRAMUSCULAR; INTRAVENOUS EVERY 6 HOURS PRN
Status: DISCONTINUED | OUTPATIENT
Start: 2021-07-15 | End: 2021-07-15 | Stop reason: DRUGHIGH

## 2021-07-15 RX ORDER — METOCLOPRAMIDE HYDROCHLORIDE 5 MG/ML
10 INJECTION INTRAMUSCULAR; INTRAVENOUS ONCE
Status: COMPLETED | OUTPATIENT
Start: 2021-07-15 | End: 2021-07-15

## 2021-07-15 RX ORDER — HALOPERIDOL 5 MG/ML
1 INJECTION INTRAMUSCULAR EVERY 4 HOURS PRN
Status: DISCONTINUED | OUTPATIENT
Start: 2021-07-15 | End: 2021-07-19

## 2021-07-15 RX ORDER — HALOPERIDOL 5 MG/ML
5 INJECTION INTRAMUSCULAR ONCE
Status: COMPLETED | OUTPATIENT
Start: 2021-07-15 | End: 2021-07-15

## 2021-07-15 NOTE — ED QUICK NOTES
Pt c/o ab pain, pt hx divertic, pt last ate this morning , fluids an hour ago, pt given zofran odt pta, pt feels that it help, still c/o ab pain, pt aox4 , ambulates w/ standby assist to cart

## 2021-07-15 NOTE — ED PROVIDER NOTES
Patient Seen in: [facility] Emergency Department      History   Patient presents with:  Nausea/Vomiting/Diarrhea    Stated Complaint: NVD, DIAPHORETIC, PALE    HPI/Subjective:   HPI    81-year-old male presents for evaluation of nausea and vomiting. air)       Current:BP (!) 183/75   Pulse 79   Temp 97.8 °F (36.6 °C) (Temporal)   Resp 20   Wt 86.2 kg   SpO2 98%   BMI 28.89 kg/m²         Physical Exam    General: Alert, oriented, no apparent distress  HEENT: Atraumatic, normocephalic.   Pupils equal jorje (1,000 mL Intravenous New Bag 7/15/21 6449)   Metoclopramide HCl (REGLAN) injection 10 mg (10 mg Intravenous Given 7/15/21 1741)   diphenhydrAMINE HCl (BENADRYL) IV PUSH injection 25 mg (25 mg Intravenous Given 7/15/21 1741)   haloperidol lactate (HALDOL)

## 2021-07-16 ENCOUNTER — APPOINTMENT (OUTPATIENT)
Dept: CT IMAGING | Facility: HOSPITAL | Age: 71
DRG: 394 | End: 2021-07-16
Attending: INTERNAL MEDICINE
Payer: COMMERCIAL

## 2021-07-16 LAB
ALBUMIN SERPL-MCNC: 4.2 G/DL (ref 3.4–5)
ALBUMIN/GLOB SERPL: 1 {RATIO} (ref 1–2)
ALP LIVER SERPL-CCNC: 104 U/L
ALT SERPL-CCNC: 29 U/L
ANION GAP SERPL CALC-SCNC: 7 MMOL/L (ref 0–18)
AST SERPL-CCNC: 14 U/L (ref 15–37)
ATRIAL RATE: 91 BPM
BASOPHILS # BLD AUTO: 0.03 X10(3) UL (ref 0–0.2)
BASOPHILS NFR BLD AUTO: 0.2 %
BILIRUB SERPL-MCNC: 0.5 MG/DL (ref 0.1–2)
BUN BLD-MCNC: 8 MG/DL (ref 7–18)
BUN/CREAT SERPL: 8.4 (ref 10–20)
CALCIUM BLD-MCNC: 9.3 MG/DL (ref 8.5–10.1)
CHLORIDE SERPL-SCNC: 111 MMOL/L (ref 98–112)
CO2 SERPL-SCNC: 23 MMOL/L (ref 21–32)
CREAT BLD-MCNC: 0.95 MG/DL
DEPRECATED RDW RBC AUTO: 44.4 FL (ref 35.1–46.3)
EOSINOPHIL # BLD AUTO: 0 X10(3) UL (ref 0–0.7)
EOSINOPHIL NFR BLD AUTO: 0 %
ERYTHROCYTE [DISTWIDTH] IN BLOOD BY AUTOMATED COUNT: 14.6 % (ref 11–15)
GLOBULIN PLAS-MCNC: 4.2 G/DL (ref 2.8–4.4)
GLUCOSE BLD-MCNC: 180 MG/DL (ref 70–99)
HCT VFR BLD AUTO: 40 %
HGB BLD-MCNC: 12.9 G/DL
IMM GRANULOCYTES # BLD AUTO: 0.11 X10(3) UL (ref 0–1)
IMM GRANULOCYTES NFR BLD: 0.6 %
LYMPHOCYTES # BLD AUTO: 1.37 X10(3) UL (ref 1–4)
LYMPHOCYTES NFR BLD AUTO: 7.4 %
M PROTEIN MFR SERPL ELPH: 8.4 G/DL (ref 6.4–8.2)
MCH RBC QN AUTO: 26.7 PG (ref 26–34)
MCHC RBC AUTO-ENTMCNC: 32.3 G/DL (ref 31–37)
MCV RBC AUTO: 82.8 FL
MONOCYTES # BLD AUTO: 1.1 X10(3) UL (ref 0.1–1)
MONOCYTES NFR BLD AUTO: 6 %
NEUTROPHILS # BLD AUTO: 15.81 X10 (3) UL (ref 1.5–7.7)
NEUTROPHILS # BLD AUTO: 15.81 X10(3) UL (ref 1.5–7.7)
NEUTROPHILS NFR BLD AUTO: 85.8 %
OSMOLALITY SERPL CALC.SUM OF ELEC: 295 MOSM/KG (ref 275–295)
P AXIS: 45 DEGREES
P-R INTERVAL: 186 MS
PLATELET # BLD AUTO: 329 10(3)UL (ref 150–450)
POTASSIUM SERPL-SCNC: 3.7 MMOL/L (ref 3.5–5.1)
Q-T INTERVAL: 294 MS
QRS DURATION: 84 MS
QTC CALCULATION (BEZET): 361 MS
R AXIS: -19 DEGREES
RBC # BLD AUTO: 4.83 X10(6)UL
SODIUM SERPL-SCNC: 141 MMOL/L (ref 136–145)
T AXIS: 43 DEGREES
VENTRICULAR RATE: 91 BPM
WBC # BLD AUTO: 18.4 X10(3) UL (ref 4–11)

## 2021-07-16 PROCEDURE — 74177 CT ABD & PELVIS W/CONTRAST: CPT | Performed by: INTERNAL MEDICINE

## 2021-07-16 PROCEDURE — 85025 COMPLETE CBC W/AUTO DIFF WBC: CPT | Performed by: HOSPITALIST

## 2021-07-16 PROCEDURE — 80053 COMPREHEN METABOLIC PANEL: CPT | Performed by: HOSPITALIST

## 2021-07-16 RX ORDER — KETOROLAC TROMETHAMINE 15 MG/ML
15 INJECTION, SOLUTION INTRAMUSCULAR; INTRAVENOUS EVERY 6 HOURS PRN
Status: DISPENSED | OUTPATIENT
Start: 2021-07-16 | End: 2021-07-17

## 2021-07-16 RX ORDER — ACETAMINOPHEN 500 MG
1000 TABLET ORAL EVERY 6 HOURS PRN
Status: DISCONTINUED | OUTPATIENT
Start: 2021-07-16 | End: 2021-07-16

## 2021-07-16 RX ORDER — HEPARIN SODIUM 5000 [USP'U]/ML
5000 INJECTION, SOLUTION INTRAVENOUS; SUBCUTANEOUS EVERY 8 HOURS SCHEDULED
Status: DISCONTINUED | OUTPATIENT
Start: 2021-07-16 | End: 2021-07-19

## 2021-07-16 RX ORDER — ONDANSETRON 4 MG/1
4 TABLET, ORALLY DISINTEGRATING ORAL EVERY 6 HOURS PRN
Status: DISCONTINUED | OUTPATIENT
Start: 2021-07-16 | End: 2021-07-19

## 2021-07-16 RX ORDER — KETOROLAC TROMETHAMINE 15 MG/ML
15 INJECTION, SOLUTION INTRAMUSCULAR; INTRAVENOUS EVERY 6 HOURS PRN
Status: DISCONTINUED | OUTPATIENT
Start: 2021-07-16 | End: 2021-07-16

## 2021-07-16 RX ORDER — TRAMADOL HYDROCHLORIDE 50 MG/1
50 TABLET ORAL EVERY 6 HOURS PRN
Status: DISCONTINUED | OUTPATIENT
Start: 2021-07-16 | End: 2021-07-19

## 2021-07-16 RX ORDER — PREDNISONE 20 MG/1
20 TABLET ORAL DAILY
Status: DISCONTINUED | OUTPATIENT
Start: 2021-07-16 | End: 2021-07-19

## 2021-07-16 RX ORDER — AMLODIPINE BESYLATE 5 MG/1
10 TABLET ORAL DAILY
Status: DISCONTINUED | OUTPATIENT
Start: 2021-07-16 | End: 2021-07-19

## 2021-07-16 RX ORDER — PANTOPRAZOLE SODIUM 40 MG/1
40 TABLET, DELAYED RELEASE ORAL
Status: DISCONTINUED | OUTPATIENT
Start: 2021-07-17 | End: 2021-07-19

## 2021-07-16 RX ORDER — ACETAMINOPHEN 500 MG
500 TABLET ORAL EVERY 6 HOURS PRN
Status: DISCONTINUED | OUTPATIENT
Start: 2021-07-16 | End: 2021-07-16

## 2021-07-16 RX ORDER — CALCIUM CARBONATE 200(500)MG
500 TABLET,CHEWABLE ORAL EVERY 6 HOURS PRN
Status: DISCONTINUED | OUTPATIENT
Start: 2021-07-16 | End: 2021-07-19

## 2021-07-16 NOTE — PROGRESS NOTES
CT unremarkable bowel, noted with mesenteric panniculitis. 1. Start Prednisone 20 mg daily.   2. Start clears

## 2021-07-16 NOTE — PROGRESS NOTES
Patient has IV fluids infusing, on room air, voiding well, intermittent nausea-Zofran and Reglan given, ambulates independently, oral prednisone started, on a clear liquid diet, Ofirmev and Toradol PRN pain. Will continue to monitor.

## 2021-07-16 NOTE — PROGRESS NOTES
[facility] Pharmacy Note:  Acetaminophen Therapeutic Duplication      Kandis Grover is a(n) 72year old patient who has been prescribed both IV acetaminophen (Ofirmev) 1000 mg every 6 hours prn and acetaminophen 650 mg oral every 6 hours prn.  Acetaminophen oral

## 2021-07-16 NOTE — CONSULTS
[place] Patient Status:  Inpatient    10/6/1950 MRN [id number]   [facility] 3NW-A Attending Farheen Catalan MD   Hosp Day # 1 PCP MD Dwight Alvarado Caryn is a 72year old male for NV diarrhea abdominal pa no F/C, no wt loss  SKIN: denies any unusual skin lesions  HEENT: denies jaundice   LUNGS: denies SOB   CV: denies chest pain GI: denies dysphagia, N/V  : denies hematuria    MSK: has no joint pain   ENDOCR: denies diabetes or thyroid history  EXAM:   BP

## 2021-07-16 NOTE — H&P
General Medicine H&P     Patient presents with:  Nausea/Vomiting/Diarrhea       PCP: Roly Argueta MD    Attempted to see earlier, at procedure     History of Present Illness: Patient is a 72year old male with PMH including but not limited to DM, renal ca, appreciated   Abdomen:   Soft, NT/ND, Bowel sounds normal. No masses,  No organomegaly. Extremities/MSK: Extremities normal/normal movement, atraumatic, no cyanosis  or edema. Skin: Skin color, texture, turgor normal. No rashes or lesions.     Neurolog exams.  There is no new hepatic mass. There is fatty infiltration of the liver. Stable 4 mm punctate calcification in the right hepatic lobe.  BILIARY:  There is a stable 6 mm calcification contiguous with the anterior wall of the gallbladder seen on mult BLADDER:  The bladder is effaced by an enlarged prostate. PELVIC NODES:  No adenopathy. PELVIC ORGANS:  The prostate gland is enlarged measuring 5.5 x 4.8 x 4.4 cm BONES:  Degenerative disc disease noted at L4-5 and L5-S1. No acute osseous abnormality.  L # Proph  - [facility]      Outpatient records or previous hospital records reviewed. [facility] hospitalist to continue to follow patient while in house.      Luke Leblanc MD  [facility] Hospitalist  Pager: [phone]  7/16/2021  2:07 PM

## 2021-07-16 NOTE — PLAN OF CARE
Problem: Patient/Family Goals  Goal: Patient/Family Long Term Goal  Description: Patient's Long Term Goal: Discharge home    Interventions:  - pain tolerable  - tolerating diet  - return to previous ADL's  - See additional Care Plan goals for specific in environment  Outcome: Progressing  Goal: Achieves appropriate nutritional intake (bariatric)  Description: INTERVENTIONS:  - Monitor for over-consumption  - Identify factors contributing to increased intake, treat as appropriate  - Monitor I&O, WT and lab

## 2021-07-16 NOTE — PLAN OF CARE
NURSING ADMISSION NOTE      Patient admitted via Cart  Oriented to room. Safety precautions initiated. Bed in low position. Call light in reach. Patient continues to complain of abdominal pain, treated with ofirmev and toradol. C/o hiccuping.

## 2021-07-17 RX ORDER — HYDRALAZINE HYDROCHLORIDE 20 MG/ML
10 INJECTION INTRAMUSCULAR; INTRAVENOUS EVERY 6 HOURS PRN
Status: DISCONTINUED | OUTPATIENT
Start: 2021-07-17 | End: 2021-07-19

## 2021-07-17 NOTE — PLAN OF CARE
Patient lost iv access and required anesthesia to replace late in evening. Pain has been severe overnight. Requiring toradol, ofirmev. Nausea has been continuous. No emesis, but c/o hiccups and heart burn. Clear liquids minimal intake.   Voids to bathr

## 2021-07-17 NOTE — PROGRESS NOTES
Patient has IV fluids infusing, on room air, voiding well, denies nausea but complains of hiccups and abdominal pain, ambulates independently, oral prednisone scheduled, tolerated a clear liquid diet but has a poor appetite.  [facility]

## 2021-07-17 NOTE — PROGRESS NOTES
[facility]  Progress Note    Fran Hernandez Patient Status:  Inpatient    10/6/1950 MRN [id number]   [facility] 3NW-A Attending Kelsi Blanton MD   Hosp Day # 2 PCP Navneet Guadalupe MD     Subjective:  Fran Hernandez is a(n) 72year old male

## 2021-07-18 NOTE — PROGRESS NOTES
[facility] Hospitalist Progress Note     PCP: Fran Sagastume MD    Chief Complaint: follow-up    Overnight/Interim Events:    SUBJECTIVE:  Laying in bed, no n/v. Ordering solids now. Tylenol for pain. OBJECTIVE:  Temp:  [98.4 °F (36.9 °C)-98.9 °F (37.2 °C)] 98. hours.      Meds:     • Heparin Sodium (Porcine)  5,000 Units Subcutaneous Q8H [address]   • predniSONE  20 mg Oral Daily   • amLODIPine Besylate  10 mg Oral Daily   • Pantoprazole Sodium  40 mg Oral QAM AC     • sodium chloride 100 mL/hr at 07/18/21 1236     hy

## 2021-07-18 NOTE — PROGRESS NOTES
A/ox4, pain to abd, prn meds given. C/o nausea/heartburn. Cleae liquid diet, poor appetite. Voiding freely. Abd soft and rounded, passing gas. Up and ambulating in halls. Ivf infusing.  Will continue to monitor

## 2021-07-18 NOTE — PLAN OF CARE
Problem: Patient/Family Goals  Goal: Patient/Family Long Term Goal  Description: Patient's Long Term Goal: Discharge home    Interventions:  - pain tolerable  - tolerating diet  - return to previous ADL's  - See additional Care Plan goals for specific in

## 2021-07-18 NOTE — PROGRESS NOTES
[facility]  Progress Note    Isai Rosales Patient Status:  Inpatient    10/6/1950 MRN [id number]   [facility] 3NW-A Attending Ladonna Hyde MD   Hosp Day # 3 PCP Paty Hurst MD     Subjective:  Isai Rosales is a(n) 72 year o

## 2021-07-19 VITALS
WEIGHT: 190.06 LBS | TEMPERATURE: 98 F | BODY MASS INDEX: 29 KG/M2 | RESPIRATION RATE: 16 BRPM | SYSTOLIC BLOOD PRESSURE: 137 MMHG | DIASTOLIC BLOOD PRESSURE: 78 MMHG | HEART RATE: 97 BPM | OXYGEN SATURATION: 96 %

## 2021-07-19 RX ORDER — PREDNISONE 20 MG/1
20 TABLET ORAL DAILY
Qty: 30 TABLET | Refills: 0 | Status: SHIPPED | OUTPATIENT
Start: 2021-07-20 | End: 2021-08-19

## 2021-07-19 NOTE — PROGRESS NOTES
NURSING DISCHARGE NOTE    Discharged Home via Wheelchair. Accompanied by Support staff  Belongings Taken by patient/family     VSS afebrile. Denies nausea or emesis. Tolerating diet well. IV removed with catheter tip noted intact.   Discharge instru

## 2021-07-19 NOTE — PROGRESS NOTES
[facility]    Progress Note    Isai Rosales Patient Status:  Inpatient    10/6/1950 MRN [id number]   [facility] 3NW-A Attending Ladonna Hyde MD   Hosp Day # 4 PCP Paty Hurst MD     Subjective:  Isai Rosales is a(n) 72 ye

## 2021-07-19 NOTE — DISCHARGE SUMMARY
[facility] Internal Medicine Discharge Summary   Patient ID:  Jose Eid  [id number]  9 year old  10/6/1950    Admit date: 7/15/2021    Discharge date and time: 7/19/2021     Attending Physician: Adenike Ayala MD     Primary Care Physician: Ramírez Gayle d/w ROSS, will review scan      # HTN  - norvasc resume; SBP was in 180s, now 130-160s, prn hydralazine      # Proph  - sqh     Day of discharge Exam    07/19/21  1138   BP: 137/78   Pulse: 97   Resp: 16   Temp: 97.9 °F (36.6 °C)       Exam on day of dischar intravenous contrast material. Post contrast coronal MPR imaging was performed. Dose reduction techniques were used.  Dose information is transmitted to the [facility] ([address] [place] [address] of Radiology) Ariel Spaulding [address] ([address]) which includes the Dose small diverticula seen in the terminal ileum. There is no evidence for acute diverticulitis. No obstructing or constricting colonic  lesion is identified. No definitive bowel wall thickening is appreciated. There is a normal-appearing appendix.   There Ignacio Diego MD  [facility]  [phone]  7/19/2021  12:26 PM

## 2021-07-19 NOTE — PROGRESS NOTES
Pt a/ox4, pain to lower back and abd, prn pain meds given w/ relief. C/o nausea and heartburn. Voiding freely. Passing gas, up and ambulating in halls. Advanced to full liquid - decreased appetite. Ivf infusing. Call light within reach.  Will continue to m

## 2021-11-30 ENCOUNTER — APPOINTMENT (OUTPATIENT)
Dept: CT IMAGING | Facility: HOSPITAL | Age: 71
End: 2021-11-30
Attending: EMERGENCY MEDICINE
Payer: COMMERCIAL

## 2021-11-30 ENCOUNTER — HOSPITAL ENCOUNTER (EMERGENCY)
Facility: HOSPITAL | Age: 71
Discharge: HOME | End: 2021-12-01
Attending: EMERGENCY MEDICINE
Payer: COMMERCIAL

## 2021-11-30 DIAGNOSIS — N28.9 RENAL INSUFFICIENCY: ICD-10-CM

## 2021-11-30 DIAGNOSIS — E87.2 METABOLIC ACIDOSIS: ICD-10-CM

## 2021-11-30 DIAGNOSIS — R11.15 CYCLICAL VOMITING: Primary | ICD-10-CM

## 2021-11-30 DIAGNOSIS — E86.0 DEHYDRATION: ICD-10-CM

## 2021-11-30 PROCEDURE — 85025 COMPLETE CBC W/AUTO DIFF WBC: CPT | Performed by: EMERGENCY MEDICINE

## 2021-11-30 PROCEDURE — 99285 EMERGENCY DEPT VISIT HI MDM: CPT

## 2021-11-30 PROCEDURE — 96372 THER/PROPH/DIAG INJ SC/IM: CPT

## 2021-11-30 PROCEDURE — 80053 COMPREHEN METABOLIC PANEL: CPT | Performed by: EMERGENCY MEDICINE

## 2021-11-30 PROCEDURE — 96374 THER/PROPH/DIAG INJ IV PUSH: CPT

## 2021-11-30 PROCEDURE — 96361 HYDRATE IV INFUSION ADD-ON: CPT

## 2021-11-30 PROCEDURE — 81001 URINALYSIS AUTO W/SCOPE: CPT | Performed by: EMERGENCY MEDICINE

## 2021-11-30 PROCEDURE — 83690 ASSAY OF LIPASE: CPT | Performed by: EMERGENCY MEDICINE

## 2021-11-30 PROCEDURE — 99284 EMERGENCY DEPT VISIT MOD MDM: CPT

## 2021-11-30 PROCEDURE — 74176 CT ABD & PELVIS W/O CONTRAST: CPT | Performed by: EMERGENCY MEDICINE

## 2021-11-30 PROCEDURE — S0028 INJECTION, FAMOTIDINE, 20 MG: HCPCS | Performed by: EMERGENCY MEDICINE

## 2021-11-30 PROCEDURE — 96375 TX/PRO/DX INJ NEW DRUG ADDON: CPT

## 2021-11-30 RX ORDER — ONDANSETRON 4 MG/1
4 TABLET, ORALLY DISINTEGRATING ORAL EVERY 4 HOURS PRN
Qty: 10 TABLET | Refills: 0 | Status: SHIPPED | OUTPATIENT
Start: 2021-11-30 | End: 2021-12-07

## 2021-11-30 RX ORDER — ONDANSETRON 2 MG/ML
4 INJECTION INTRAMUSCULAR; INTRAVENOUS ONCE
Status: COMPLETED | OUTPATIENT
Start: 2021-11-30 | End: 2021-11-30

## 2021-11-30 RX ORDER — FAMOTIDINE 10 MG/ML
20 INJECTION, SOLUTION INTRAVENOUS ONCE
Status: COMPLETED | OUTPATIENT
Start: 2021-11-30 | End: 2021-11-30

## 2021-11-30 RX ORDER — DICYCLOMINE HYDROCHLORIDE 10 MG/ML
20 INJECTION INTRAMUSCULAR ONCE
Status: COMPLETED | OUTPATIENT
Start: 2021-11-30 | End: 2021-11-30

## 2021-12-01 VITALS
SYSTOLIC BLOOD PRESSURE: 159 MMHG | TEMPERATURE: 97 F | BODY MASS INDEX: 31.39 KG/M2 | DIASTOLIC BLOOD PRESSURE: 69 MMHG | RESPIRATION RATE: 19 BRPM | WEIGHT: 200 LBS | HEART RATE: 104 BPM | HEIGHT: 67 IN | OXYGEN SATURATION: 98 %

## 2021-12-01 NOTE — ED QUICK NOTES
Pt reported cramping pain is better. Pt got up and tried walking, reporting better better but still a little shaky. MD aware.

## 2021-12-01 NOTE — ED PROVIDER NOTES
Patient Seen in: [facility] Emergency Department      History   Patient presents with:  Nausea/Vomiting/Diarrhea    Stated Complaint: N/V    Subjective:   HPI    28-year-old male presents emergency department has had nausea vomiting for the last 3 d germicidal wipes following the exam.         Vital signs reviewed  General appearance: Patient is alert and is ill-appearing in mild distress  HEENT: Pupils equal react to light extraocular muscles intact no scleral icterus, mucous membranes are dry, there Differential With Platelet.   Procedure                               Abnormality         Status                     ---------                               -----------         ------                     CBC W/ DIFFERENTIAL[[id number]]          Abnormal Unremarkable. BOWEL/MESENTERY:  Unremarkable appendix. No large or small bowel dilatation. Uncomplicated colonic diverticulosis most pronounced along the sigmoid colon. No free air or free fluid. ABDOMINAL WALL:  Unremarkable.  PELVIC ORGANS:  Moderate p questions, complaints, or concerns. Patient was comfortable going home. This note was prepared using [employer] voice recognition dictation software. As a result errors may occur. When identified these errors have been corrected.  While every atte

## 2022-05-12 ENCOUNTER — HOSPITAL ENCOUNTER (EMERGENCY)
Facility: HOSPITAL | Age: 72
Discharge: HOME | End: 2022-05-12
Attending: EMERGENCY MEDICINE
Payer: COMMERCIAL

## 2022-05-12 VITALS
OXYGEN SATURATION: 97 % | HEART RATE: 73 BPM | DIASTOLIC BLOOD PRESSURE: 67 MMHG | SYSTOLIC BLOOD PRESSURE: 120 MMHG | TEMPERATURE: 98 F | RESPIRATION RATE: 18 BRPM

## 2022-05-12 DIAGNOSIS — R11.2 NAUSEA AND VOMITING, UNSPECIFIED VOMITING TYPE: Primary | ICD-10-CM

## 2022-05-12 LAB
ALBUMIN SERPL-MCNC: 3.8 G/DL (ref 3.4–5)
ALBUMIN/GLOB SERPL: 1 {RATIO} (ref 1–2)
ALP LIVER SERPL-CCNC: 103 U/L
ALT SERPL-CCNC: 34 U/L
ANION GAP SERPL CALC-SCNC: 5 MMOL/L (ref 0–18)
AST SERPL-CCNC: 22 U/L (ref 15–37)
BASOPHILS # BLD AUTO: 0.02 X10(3) UL (ref 0–0.2)
BASOPHILS NFR BLD AUTO: 0.2 %
BILIRUB SERPL-MCNC: 0.1 MG/DL (ref 0.1–2)
BILIRUB UR QL STRIP.AUTO: NEGATIVE
BUN BLD-MCNC: 11 MG/DL (ref 7–18)
CALCIUM BLD-MCNC: 8.9 MG/DL (ref 8.5–10.1)
CHLORIDE SERPL-SCNC: 112 MMOL/L (ref 98–112)
CLARITY UR REFRACT.AUTO: CLEAR
CO2 SERPL-SCNC: 24 MMOL/L (ref 21–32)
CREAT BLD-MCNC: 0.87 MG/DL
EOSINOPHIL # BLD AUTO: 0.02 X10(3) UL (ref 0–0.7)
EOSINOPHIL NFR BLD AUTO: 0.2 %
ERYTHROCYTE [DISTWIDTH] IN BLOOD BY AUTOMATED COUNT: 14.2 %
GLOBULIN PLAS-MCNC: 4 G/DL (ref 2.8–4.4)
GLUCOSE BLD-MCNC: 107 MG/DL (ref 70–99)
GLUCOSE UR STRIP.AUTO-MCNC: NEGATIVE MG/DL
HCT VFR BLD AUTO: 39 %
HGB BLD-MCNC: 12.4 G/DL
IMM GRANULOCYTES # BLD AUTO: 0.05 X10(3) UL (ref 0–1)
IMM GRANULOCYTES NFR BLD: 0.4 %
KETONES UR STRIP.AUTO-MCNC: NEGATIVE MG/DL
LIPASE SERPL-CCNC: 63 U/L (ref 73–393)
LYMPHOCYTES # BLD AUTO: 1.92 X10(3) UL (ref 1–4)
LYMPHOCYTES NFR BLD AUTO: 16.1 %
MCH RBC QN AUTO: 27.2 PG (ref 26–34)
MCHC RBC AUTO-ENTMCNC: 31.8 G/DL (ref 31–37)
MCV RBC AUTO: 85.5 FL
MONOCYTES # BLD AUTO: 0.79 X10(3) UL (ref 0.1–1)
MONOCYTES NFR BLD AUTO: 6.6 %
NEUTROPHILS # BLD AUTO: 9.15 X10 (3) UL (ref 1.5–7.7)
NEUTROPHILS # BLD AUTO: 9.15 X10(3) UL (ref 1.5–7.7)
NEUTROPHILS NFR BLD AUTO: 76.5 %
NITRITE UR QL STRIP.AUTO: NEGATIVE
OSMOLALITY SERPL CALC.SUM OF ELEC: 292 MOSM/KG (ref 275–295)
PH UR STRIP.AUTO: 6 [PH] (ref 5–8)
PLATELET # BLD AUTO: 295 10(3)UL (ref 150–450)
POTASSIUM SERPL-SCNC: 3.9 MMOL/L (ref 3.5–5.1)
PROT SERPL-MCNC: 7.8 G/DL (ref 6.4–8.2)
PROT UR STRIP.AUTO-MCNC: NEGATIVE MG/DL
RBC # BLD AUTO: 4.56 X10(6)UL
RBC UR QL AUTO: NEGATIVE
SODIUM SERPL-SCNC: 141 MMOL/L (ref 136–145)
SP GR UR STRIP.AUTO: 1.01 (ref 1–1.03)
UROBILINOGEN UR STRIP.AUTO-MCNC: <2 MG/DL
WBC # BLD AUTO: 12 X10(3) UL (ref 4–11)

## 2022-05-12 PROCEDURE — 87086 URINE CULTURE/COLONY COUNT: CPT | Performed by: EMERGENCY MEDICINE

## 2022-05-12 PROCEDURE — 99284 EMERGENCY DEPT VISIT MOD MDM: CPT

## 2022-05-12 PROCEDURE — 96360 HYDRATION IV INFUSION INIT: CPT

## 2022-05-12 PROCEDURE — 85025 COMPLETE CBC W/AUTO DIFF WBC: CPT | Performed by: EMERGENCY MEDICINE

## 2022-05-12 PROCEDURE — 99283 EMERGENCY DEPT VISIT LOW MDM: CPT

## 2022-05-12 PROCEDURE — 83690 ASSAY OF LIPASE: CPT | Performed by: EMERGENCY MEDICINE

## 2022-05-12 PROCEDURE — 80053 COMPREHEN METABOLIC PANEL: CPT | Performed by: EMERGENCY MEDICINE

## 2022-05-12 PROCEDURE — 81001 URINALYSIS AUTO W/SCOPE: CPT | Performed by: EMERGENCY MEDICINE

## 2022-05-12 RX ORDER — ONDANSETRON 2 MG/ML
4 INJECTION INTRAMUSCULAR; INTRAVENOUS ONCE
Status: DISCONTINUED | OUTPATIENT
Start: 2022-05-12 | End: 2022-05-12

## 2022-05-12 NOTE — ED INITIAL ASSESSMENT (HPI)
Pt to the emergency room for abdominal pain that has been going on for the past 2 years. Pt states that the pain has been intermittent and usually presents after a meal. Pt normally takes a pain medication that starts with d for cramping but it hasnt been working. Pt has hx of cancer and is currently in remission.

## 2022-09-09 ENCOUNTER — APPOINTMENT (OUTPATIENT)
Dept: CT IMAGING | Facility: HOSPITAL | Age: 72
End: 2022-09-09
Attending: EMERGENCY MEDICINE
Payer: COMMERCIAL

## 2022-09-09 ENCOUNTER — APPOINTMENT (OUTPATIENT)
Dept: GENERAL RADIOLOGY | Facility: HOSPITAL | Age: 72
End: 2022-09-09
Attending: EMERGENCY MEDICINE
Payer: COMMERCIAL

## 2022-09-09 ENCOUNTER — HOSPITAL ENCOUNTER (EMERGENCY)
Facility: HOSPITAL | Age: 72
Discharge: HOME | End: 2022-09-09
Attending: EMERGENCY MEDICINE
Payer: COMMERCIAL

## 2022-09-09 VITALS
OXYGEN SATURATION: 98 % | WEIGHT: 185 LBS | HEART RATE: 88 BPM | BODY MASS INDEX: 28.04 KG/M2 | HEIGHT: 68 IN | DIASTOLIC BLOOD PRESSURE: 81 MMHG | TEMPERATURE: 98 F | SYSTOLIC BLOOD PRESSURE: 166 MMHG | RESPIRATION RATE: 15 BRPM

## 2022-09-09 DIAGNOSIS — R19.7 NAUSEA VOMITING AND DIARRHEA: Primary | ICD-10-CM

## 2022-09-09 DIAGNOSIS — R11.2 NAUSEA VOMITING AND DIARRHEA: Primary | ICD-10-CM

## 2022-09-09 DIAGNOSIS — D72.829 LEUKOCYTOSIS, UNSPECIFIED TYPE: ICD-10-CM

## 2022-09-09 LAB
ALBUMIN SERPL-MCNC: 4.4 G/DL (ref 3.4–5)
ALBUMIN/GLOB SERPL: 1 {RATIO} (ref 1–2)
ALP LIVER SERPL-CCNC: 122 U/L
ALT SERPL-CCNC: 28 U/L
ANION GAP SERPL CALC-SCNC: 9 MMOL/L (ref 0–18)
AST SERPL-CCNC: 15 U/L (ref 15–37)
ATRIAL RATE: 81 BPM
BASOPHILS # BLD AUTO: 0.03 X10(3) UL (ref 0–0.2)
BASOPHILS NFR BLD AUTO: 0.2 %
BILIRUB SERPL-MCNC: 0.5 MG/DL (ref 0.1–2)
BILIRUB UR QL STRIP.AUTO: NEGATIVE
BUN BLD-MCNC: 10 MG/DL (ref 7–18)
CALCIUM BLD-MCNC: 9.9 MG/DL (ref 8.5–10.1)
CHLORIDE SERPL-SCNC: 111 MMOL/L (ref 98–112)
CLARITY UR REFRACT.AUTO: CLEAR
CO2 SERPL-SCNC: 21 MMOL/L (ref 21–32)
COLOR UR AUTO: YELLOW
CREAT BLD-MCNC: 1.11 MG/DL
EOSINOPHIL # BLD AUTO: 0 X10(3) UL (ref 0–0.7)
EOSINOPHIL NFR BLD AUTO: 0 %
ERYTHROCYTE [DISTWIDTH] IN BLOOD BY AUTOMATED COUNT: 14.6 %
GFR SERPLBLD BASED ON 1.73 SQ M-ARVRAT: 71 ML/MIN/1.73M2 (ref 60–?)
GLOBULIN PLAS-MCNC: 4.4 G/DL (ref 2.8–4.4)
GLUCOSE BLD-MCNC: 158 MG/DL (ref 70–99)
GLUCOSE UR STRIP.AUTO-MCNC: NEGATIVE MG/DL
HCT VFR BLD AUTO: 38.2 %
HGB BLD-MCNC: 12.5 G/DL
IMM GRANULOCYTES # BLD AUTO: 0.08 X10(3) UL (ref 0–1)
IMM GRANULOCYTES NFR BLD: 0.5 %
KETONES UR STRIP.AUTO-MCNC: 15 MG/DL
LEUKOCYTE ESTERASE UR QL STRIP.AUTO: NEGATIVE
LIPASE SERPL-CCNC: 67 U/L (ref 73–393)
LYMPHOCYTES # BLD AUTO: 1.8 X10(3) UL (ref 1–4)
LYMPHOCYTES NFR BLD AUTO: 10.2 %
MCH RBC QN AUTO: 27.6 PG (ref 26–34)
MCHC RBC AUTO-ENTMCNC: 32.7 G/DL (ref 31–37)
MCV RBC AUTO: 84.3 FL
MONOCYTES # BLD AUTO: 1.33 X10(3) UL (ref 0.1–1)
MONOCYTES NFR BLD AUTO: 7.6 %
NEUTROPHILS # BLD AUTO: 14.37 X10 (3) UL (ref 1.5–7.7)
NEUTROPHILS # BLD AUTO: 14.37 X10(3) UL (ref 1.5–7.7)
NEUTROPHILS NFR BLD AUTO: 81.5 %
NITRITE UR QL STRIP.AUTO: NEGATIVE
OSMOLALITY SERPL CALC.SUM OF ELEC: 294 MOSM/KG (ref 275–295)
P AXIS: 32 DEGREES
P-R INTERVAL: 178 MS
PH UR STRIP.AUTO: 6 [PH] (ref 5–8)
PLATELET # BLD AUTO: 335 10(3)UL (ref 150–450)
POTASSIUM SERPL-SCNC: 3.7 MMOL/L (ref 3.5–5.1)
PROT SERPL-MCNC: 8.8 G/DL (ref 6.4–8.2)
Q-T INTERVAL: 384 MS
QRS DURATION: 82 MS
QTC CALCULATION (BEZET): 446 MS
R AXIS: -10 DEGREES
RBC # BLD AUTO: 4.53 X10(6)UL
SODIUM SERPL-SCNC: 141 MMOL/L (ref 136–145)
SP GR UR STRIP.AUTO: 1.01 (ref 1–1.03)
T AXIS: 42 DEGREES
TROPONIN I HIGH SENSITIVITY: 17 NG/L
UROBILINOGEN UR STRIP.AUTO-MCNC: 0.2 MG/DL
VENTRICULAR RATE: 81 BPM
WBC # BLD AUTO: 17.6 X10(3) UL (ref 4–11)

## 2022-09-09 PROCEDURE — 99285 EMERGENCY DEPT VISIT HI MDM: CPT

## 2022-09-09 PROCEDURE — 99284 EMERGENCY DEPT VISIT MOD MDM: CPT

## 2022-09-09 PROCEDURE — 81015 MICROSCOPIC EXAM OF URINE: CPT | Performed by: EMERGENCY MEDICINE

## 2022-09-09 PROCEDURE — 85025 COMPLETE CBC W/AUTO DIFF WBC: CPT | Performed by: EMERGENCY MEDICINE

## 2022-09-09 PROCEDURE — 96361 HYDRATE IV INFUSION ADD-ON: CPT

## 2022-09-09 PROCEDURE — 74018 RADEX ABDOMEN 1 VIEW: CPT | Performed by: EMERGENCY MEDICINE

## 2022-09-09 PROCEDURE — 74177 CT ABD & PELVIS W/CONTRAST: CPT | Performed by: EMERGENCY MEDICINE

## 2022-09-09 PROCEDURE — 96376 TX/PRO/DX INJ SAME DRUG ADON: CPT

## 2022-09-09 PROCEDURE — 93005 ELECTROCARDIOGRAM TRACING: CPT

## 2022-09-09 PROCEDURE — 96374 THER/PROPH/DIAG INJ IV PUSH: CPT

## 2022-09-09 PROCEDURE — 84484 ASSAY OF TROPONIN QUANT: CPT | Performed by: EMERGENCY MEDICINE

## 2022-09-09 PROCEDURE — 93010 ELECTROCARDIOGRAM REPORT: CPT

## 2022-09-09 PROCEDURE — 80053 COMPREHEN METABOLIC PANEL: CPT | Performed by: EMERGENCY MEDICINE

## 2022-09-09 PROCEDURE — 81001 URINALYSIS AUTO W/SCOPE: CPT | Performed by: EMERGENCY MEDICINE

## 2022-09-09 PROCEDURE — 83690 ASSAY OF LIPASE: CPT | Performed by: EMERGENCY MEDICINE

## 2022-09-09 RX ORDER — IOHEXOL 350 MG/ML
100 INJECTION, SOLUTION INTRAVENOUS
Status: COMPLETED | OUTPATIENT
Start: 2022-09-09 | End: 2022-09-09

## 2022-09-09 RX ORDER — ONDANSETRON 4 MG/1
4 TABLET, ORALLY DISINTEGRATING ORAL EVERY 4 HOURS PRN
Qty: 10 TABLET | Refills: 0 | Status: SHIPPED | OUTPATIENT
Start: 2022-09-09 | End: 2022-09-16

## 2022-09-09 RX ORDER — PANTOPRAZOLE SODIUM 40 MG/1
40 TABLET, DELAYED RELEASE ORAL DAILY
Qty: 30 TABLET | Refills: 0 | Status: SHIPPED | OUTPATIENT
Start: 2022-09-09 | End: 2022-10-09

## 2022-09-09 RX ORDER — ONDANSETRON 2 MG/ML
4 INJECTION INTRAMUSCULAR; INTRAVENOUS ONCE
Status: COMPLETED | OUTPATIENT
Start: 2022-09-09 | End: 2022-09-09

## 2023-02-03 ENCOUNTER — HOSPITAL ENCOUNTER (EMERGENCY)
Facility: HOSPITAL | Age: 73
Discharge: HOME | End: 2023-02-03
Attending: EMERGENCY MEDICINE
Payer: COMMERCIAL

## 2023-02-03 ENCOUNTER — APPOINTMENT (OUTPATIENT)
Dept: CT IMAGING | Facility: HOSPITAL | Age: 73
End: 2023-02-03
Attending: EMERGENCY MEDICINE
Payer: COMMERCIAL

## 2023-02-03 VITALS
HEART RATE: 82 BPM | OXYGEN SATURATION: 95 % | DIASTOLIC BLOOD PRESSURE: 84 MMHG | TEMPERATURE: 99 F | SYSTOLIC BLOOD PRESSURE: 166 MMHG | RESPIRATION RATE: 15 BRPM

## 2023-02-03 DIAGNOSIS — R11.2 NAUSEA AND VOMITING, UNSPECIFIED VOMITING TYPE: Primary | ICD-10-CM

## 2023-02-03 LAB
ALBUMIN SERPL-MCNC: 4.4 G/DL (ref 3.4–5)
ALBUMIN/GLOB SERPL: 0.9 {RATIO} (ref 1–2)
ALP LIVER SERPL-CCNC: 119 U/L
ALT SERPL-CCNC: 26 U/L
ANION GAP SERPL CALC-SCNC: 8 MMOL/L (ref 0–18)
AST SERPL-CCNC: 26 U/L (ref 15–37)
ATRIAL RATE: 94 BPM
BASOPHILS # BLD AUTO: 0.04 X10(3) UL (ref 0–0.2)
BASOPHILS NFR BLD AUTO: 0.2 %
BILIRUB SERPL-MCNC: 0.4 MG/DL (ref 0.1–2)
BUN BLD-MCNC: 10 MG/DL (ref 7–18)
CALCIUM BLD-MCNC: 10 MG/DL (ref 8.5–10.1)
CHLORIDE SERPL-SCNC: 108 MMOL/L (ref 98–112)
CO2 SERPL-SCNC: 25 MMOL/L (ref 21–32)
CREAT BLD-MCNC: 1.38 MG/DL
EOSINOPHIL # BLD AUTO: 0.01 X10(3) UL (ref 0–0.7)
EOSINOPHIL NFR BLD AUTO: 0.1 %
ERYTHROCYTE [DISTWIDTH] IN BLOOD BY AUTOMATED COUNT: 14.9 %
GFR SERPLBLD BASED ON 1.73 SQ M-ARVRAT: 55 ML/MIN/1.73M2 (ref 60–?)
GLOBULIN PLAS-MCNC: 4.9 G/DL (ref 2.8–4.4)
GLUCOSE BLD-MCNC: 140 MG/DL (ref 70–99)
HCT VFR BLD AUTO: 42.9 %
HGB BLD-MCNC: 14.5 G/DL
IMM GRANULOCYTES # BLD AUTO: 0.09 X10(3) UL (ref 0–1)
IMM GRANULOCYTES NFR BLD: 0.5 %
LIPASE SERPL-CCNC: 23 U/L (ref 13–75)
LIPASE SERPL-CCNC: 75 U/L (ref 73–393)
LYMPHOCYTES # BLD AUTO: 2.23 X10(3) UL (ref 1–4)
LYMPHOCYTES NFR BLD AUTO: 11.4 %
MCH RBC QN AUTO: 28.3 PG (ref 26–34)
MCHC RBC AUTO-ENTMCNC: 33.8 G/DL (ref 31–37)
MCV RBC AUTO: 83.8 FL
MONOCYTES # BLD AUTO: 1.79 X10(3) UL (ref 0.1–1)
MONOCYTES NFR BLD AUTO: 9.1 %
NEUTROPHILS # BLD AUTO: 15.47 X10 (3) UL (ref 1.5–7.7)
NEUTROPHILS # BLD AUTO: 15.47 X10(3) UL (ref 1.5–7.7)
NEUTROPHILS NFR BLD AUTO: 78.7 %
OSMOLALITY SERPL CALC.SUM OF ELEC: 293 MOSM/KG (ref 275–295)
P AXIS: 51 DEGREES
P-R INTERVAL: 162 MS
PLATELET # BLD AUTO: 350 10(3)UL (ref 150–450)
POTASSIUM SERPL-SCNC: 3.2 MMOL/L (ref 3.5–5.1)
PROT SERPL-MCNC: 9.3 G/DL (ref 6.4–8.2)
Q-T INTERVAL: 358 MS
QRS DURATION: 76 MS
QTC CALCULATION (BEZET): 447 MS
R AXIS: -20 DEGREES
RBC # BLD AUTO: 5.12 X10(6)UL
SARS-COV-2 RNA RESP QL NAA+PROBE: NOT DETECTED
SODIUM SERPL-SCNC: 141 MMOL/L (ref 136–145)
T AXIS: 39 DEGREES
VENTRICULAR RATE: 94 BPM
WBC # BLD AUTO: 19.6 X10(3) UL (ref 4–11)

## 2023-02-03 PROCEDURE — 96361 HYDRATE IV INFUSION ADD-ON: CPT

## 2023-02-03 PROCEDURE — 85025 COMPLETE CBC W/AUTO DIFF WBC: CPT | Performed by: EMERGENCY MEDICINE

## 2023-02-03 PROCEDURE — 80053 COMPREHEN METABOLIC PANEL: CPT | Performed by: EMERGENCY MEDICINE

## 2023-02-03 PROCEDURE — 93010 ELECTROCARDIOGRAM REPORT: CPT

## 2023-02-03 PROCEDURE — 83690 ASSAY OF LIPASE: CPT | Performed by: EMERGENCY MEDICINE

## 2023-02-03 PROCEDURE — 99285 EMERGENCY DEPT VISIT HI MDM: CPT

## 2023-02-03 PROCEDURE — 96360 HYDRATION IV INFUSION INIT: CPT

## 2023-02-03 PROCEDURE — 93005 ELECTROCARDIOGRAM TRACING: CPT

## 2023-02-03 PROCEDURE — 74176 CT ABD & PELVIS W/O CONTRAST: CPT | Performed by: EMERGENCY MEDICINE

## 2023-02-03 RX ORDER — ONDANSETRON 4 MG/1
4 TABLET, ORALLY DISINTEGRATING ORAL EVERY 4 HOURS PRN
Qty: 10 TABLET | Refills: 0 | Status: SHIPPED | OUTPATIENT
Start: 2023-02-03 | End: 2023-02-10

## 2023-02-03 RX ORDER — POTASSIUM CHLORIDE 20 MEQ/1
40 TABLET, EXTENDED RELEASE ORAL ONCE
Status: COMPLETED | OUTPATIENT
Start: 2023-02-03 | End: 2023-02-03

## 2023-03-10 NOTE — PROGRESS NOTES
[facility] Pharmacy Note:  Age Based Dose Adjustment    Kandis Jacobs has been prescribed ketorolac (TORADOL) 15-30 mg IV every 6 hours as needed for pain. Patient is >71 years old therefore the dose has been adjusted to 15 mg IV every 6 hours prn.       Thank adilene Soft, non-tender, no hepatosplenomegaly, normal bowel sounds

## 2023-08-11 ENCOUNTER — APPOINTMENT (OUTPATIENT)
Dept: CT IMAGING | Facility: HOSPITAL | Age: 73
End: 2023-08-11
Attending: EMERGENCY MEDICINE

## 2023-08-11 ENCOUNTER — APPOINTMENT (OUTPATIENT)
Dept: GENERAL RADIOLOGY | Facility: HOSPITAL | Age: 73
End: 2023-08-11
Attending: EMERGENCY MEDICINE

## 2023-08-11 ENCOUNTER — HOSPITAL ENCOUNTER (OUTPATIENT)
Facility: HOSPITAL | Age: 73
Setting detail: OBSERVATION
Discharge: HOME | End: 2023-08-12
Attending: EMERGENCY MEDICINE | Admitting: STUDENT IN AN ORGANIZED HEALTH CARE EDUCATION/TRAINING PROGRAM

## 2023-08-11 DIAGNOSIS — R11.2 NAUSEA AND VOMITING IN ADULT: Primary | ICD-10-CM

## 2023-08-11 LAB
ALBUMIN SERPL-MCNC: 4.4 G/DL (ref 3.4–5)
ALBUMIN/GLOB SERPL: 1 {RATIO} (ref 1–2)
ALP LIVER SERPL-CCNC: 116 U/L
ALT SERPL-CCNC: 23 U/L
ANION GAP SERPL CALC-SCNC: 8 MMOL/L (ref 0–18)
AST SERPL-CCNC: 22 U/L (ref 15–37)
BASOPHILS # BLD AUTO: 0.04 X10(3) UL (ref 0–0.2)
BASOPHILS NFR BLD AUTO: 0.2 %
BILIRUB SERPL-MCNC: 0.4 MG/DL (ref 0.1–2)
BILIRUB UR QL STRIP.AUTO: NEGATIVE
BUN BLD-MCNC: 10 MG/DL (ref 7–18)
CALCIUM BLD-MCNC: 9.5 MG/DL (ref 8.5–10.1)
CHLORIDE SERPL-SCNC: 109 MMOL/L (ref 98–112)
CLARITY UR REFRACT.AUTO: CLEAR
CO2 SERPL-SCNC: 20 MMOL/L (ref 21–32)
CREAT BLD-MCNC: 0.92 MG/DL
EGFRCR SERPLBLD CKD-EPI 2021: 89 ML/MIN/1.73M2 (ref 60–?)
EOSINOPHIL # BLD AUTO: 0.01 X10(3) UL (ref 0–0.7)
EOSINOPHIL NFR BLD AUTO: 0 %
ERYTHROCYTE [DISTWIDTH] IN BLOOD BY AUTOMATED COUNT: 14.5 %
FLUAV + FLUBV RNA SPEC NAA+PROBE: NEGATIVE
FLUAV + FLUBV RNA SPEC NAA+PROBE: NEGATIVE
GLOBULIN PLAS-MCNC: 4.6 G/DL (ref 2.8–4.4)
GLUCOSE BLD-MCNC: 225 MG/DL (ref 70–99)
GLUCOSE UR STRIP.AUTO-MCNC: 150 MG/DL
HCT VFR BLD AUTO: 40.7 %
HGB BLD-MCNC: 13.5 G/DL
IMM GRANULOCYTES # BLD AUTO: 0.1 X10(3) UL (ref 0–1)
IMM GRANULOCYTES NFR BLD: 0.5 %
KETONES UR STRIP.AUTO-MCNC: 20 MG/DL
LACTATE SERPL-SCNC: 1.4 MMOL/L (ref 0.4–2)
LEUKOCYTE ESTERASE UR QL STRIP.AUTO: NEGATIVE
LIPASE SERPL-CCNC: 25 U/L (ref 13–75)
LYMPHOCYTES # BLD AUTO: 1.9 X10(3) UL (ref 1–4)
LYMPHOCYTES NFR BLD AUTO: 9.1 %
MCH RBC QN AUTO: 27.4 PG (ref 26–34)
MCHC RBC AUTO-ENTMCNC: 33.2 G/DL (ref 31–37)
MCV RBC AUTO: 82.7 FL
MONOCYTES # BLD AUTO: 1.03 X10(3) UL (ref 0.1–1)
MONOCYTES NFR BLD AUTO: 4.9 %
NEUTROPHILS # BLD AUTO: 17.77 X10 (3) UL (ref 1.5–7.7)
NEUTROPHILS # BLD AUTO: 17.77 X10(3) UL (ref 1.5–7.7)
NEUTROPHILS NFR BLD AUTO: 85.3 %
NITRITE UR QL STRIP.AUTO: NEGATIVE
OSMOLALITY SERPL CALC.SUM OF ELEC: 290 MOSM/KG (ref 275–295)
PH UR STRIP.AUTO: 6 [PH] (ref 5–8)
PLATELET # BLD AUTO: 340 10(3)UL (ref 150–450)
POTASSIUM SERPL-SCNC: 3.8 MMOL/L (ref 3.5–5.1)
PROT SERPL-MCNC: 9 G/DL (ref 6.4–8.2)
PROT UR STRIP.AUTO-MCNC: NEGATIVE MG/DL
RBC # BLD AUTO: 4.92 X10(6)UL
RBC UR QL AUTO: NEGATIVE
RSV RNA SPEC NAA+PROBE: NEGATIVE
SARS-COV-2 RNA RESP QL NAA+PROBE: NOT DETECTED
SARS-COV-2 RNA RESP QL NAA+PROBE: NOT DETECTED
SODIUM SERPL-SCNC: 137 MMOL/L (ref 136–145)
SP GR UR STRIP.AUTO: >1.03 (ref 1–1.03)
TROPONIN I HIGH SENSITIVITY: 4 NG/L
UROBILINOGEN UR STRIP.AUTO-MCNC: <2 MG/DL
WBC # BLD AUTO: 20.9 X10(3) UL (ref 4–11)

## 2023-08-11 PROCEDURE — 0241U SARS-COV-2/FLU A AND B/RSV BY PCR (GENEXPERT): CPT | Performed by: EMERGENCY MEDICINE

## 2023-08-11 PROCEDURE — 93005 ELECTROCARDIOGRAM TRACING: CPT

## 2023-08-11 PROCEDURE — 84484 ASSAY OF TROPONIN QUANT: CPT | Performed by: EMERGENCY MEDICINE

## 2023-08-11 PROCEDURE — 99285 EMERGENCY DEPT VISIT HI MDM: CPT

## 2023-08-11 PROCEDURE — 83605 ASSAY OF LACTIC ACID: CPT | Performed by: EMERGENCY MEDICINE

## 2023-08-11 PROCEDURE — 0241U SARS-COV-2/FLU A AND B/RSV BY PCR (GENEXPERT): CPT

## 2023-08-11 PROCEDURE — 83690 ASSAY OF LIPASE: CPT | Performed by: EMERGENCY MEDICINE

## 2023-08-11 PROCEDURE — 71045 X-RAY EXAM CHEST 1 VIEW: CPT | Performed by: EMERGENCY MEDICINE

## 2023-08-11 PROCEDURE — 80053 COMPREHEN METABOLIC PANEL: CPT | Performed by: EMERGENCY MEDICINE

## 2023-08-11 PROCEDURE — 80053 COMPREHEN METABOLIC PANEL: CPT

## 2023-08-11 PROCEDURE — 96361 HYDRATE IV INFUSION ADD-ON: CPT

## 2023-08-11 PROCEDURE — 96375 TX/PRO/DX INJ NEW DRUG ADDON: CPT

## 2023-08-11 PROCEDURE — 81003 URINALYSIS AUTO W/O SCOPE: CPT | Performed by: EMERGENCY MEDICINE

## 2023-08-11 PROCEDURE — 85025 COMPLETE CBC W/AUTO DIFF WBC: CPT | Performed by: EMERGENCY MEDICINE

## 2023-08-11 PROCEDURE — 96374 THER/PROPH/DIAG INJ IV PUSH: CPT

## 2023-08-11 PROCEDURE — 93010 ELECTROCARDIOGRAM REPORT: CPT

## 2023-08-11 PROCEDURE — C9113 INJ PANTOPRAZOLE SODIUM, VIA: HCPCS | Performed by: EMERGENCY MEDICINE

## 2023-08-11 PROCEDURE — 74177 CT ABD & PELVIS W/CONTRAST: CPT | Performed by: EMERGENCY MEDICINE

## 2023-08-11 PROCEDURE — 85025 COMPLETE CBC W/AUTO DIFF WBC: CPT

## 2023-08-11 PROCEDURE — 96376 TX/PRO/DX INJ SAME DRUG ADON: CPT

## 2023-08-11 RX ORDER — PANTOPRAZOLE SODIUM 40 MG/1
40 TABLET, DELAYED RELEASE ORAL DAILY
COMMUNITY
Start: 2023-07-24

## 2023-08-11 RX ORDER — ONDANSETRON 2 MG/ML
4 INJECTION INTRAMUSCULAR; INTRAVENOUS ONCE
Status: COMPLETED | OUTPATIENT
Start: 2023-08-11 | End: 2023-08-11

## 2023-08-11 RX ORDER — MORPHINE SULFATE 4 MG/ML
4 INJECTION, SOLUTION INTRAMUSCULAR; INTRAVENOUS ONCE
Status: COMPLETED | OUTPATIENT
Start: 2023-08-11 | End: 2023-08-11

## 2023-08-12 VITALS
DIASTOLIC BLOOD PRESSURE: 55 MMHG | HEART RATE: 89 BPM | WEIGHT: 179.5 LBS | RESPIRATION RATE: 16 BRPM | SYSTOLIC BLOOD PRESSURE: 149 MMHG | TEMPERATURE: 98 F | BODY MASS INDEX: 27 KG/M2 | OXYGEN SATURATION: 95 %

## 2023-08-12 LAB
ALBUMIN SERPL-MCNC: 4.1 G/DL (ref 3.4–5)
ALBUMIN/GLOB SERPL: 1 {RATIO} (ref 1–2)
ALP LIVER SERPL-CCNC: 103 U/L
ALT SERPL-CCNC: 20 U/L
ANION GAP SERPL CALC-SCNC: 6 MMOL/L (ref 0–18)
AST SERPL-CCNC: 13 U/L (ref 15–37)
BASOPHILS # BLD AUTO: 0.04 X10(3) UL (ref 0–0.2)
BASOPHILS NFR BLD AUTO: 0.2 %
BILIRUB SERPL-MCNC: 0.5 MG/DL (ref 0.1–2)
BUN BLD-MCNC: 9 MG/DL (ref 7–18)
CALCIUM BLD-MCNC: 9.1 MG/DL (ref 8.5–10.1)
CHLORIDE SERPL-SCNC: 111 MMOL/L (ref 98–112)
CO2 SERPL-SCNC: 24 MMOL/L (ref 21–32)
CREAT BLD-MCNC: 1.13 MG/DL
EGFRCR SERPLBLD CKD-EPI 2021: 69 ML/MIN/1.73M2 (ref 60–?)
EOSINOPHIL # BLD AUTO: 0 X10(3) UL (ref 0–0.7)
EOSINOPHIL NFR BLD AUTO: 0 %
ERYTHROCYTE [DISTWIDTH] IN BLOOD BY AUTOMATED COUNT: 15.3 %
GLOBULIN PLAS-MCNC: 4.2 G/DL (ref 2.8–4.4)
GLUCOSE BLD-MCNC: 150 MG/DL (ref 70–99)
GLUCOSE BLD-MCNC: 151 MG/DL (ref 70–99)
GLUCOSE BLD-MCNC: 156 MG/DL (ref 70–99)
GLUCOSE BLD-MCNC: 184 MG/DL (ref 70–99)
HCT VFR BLD AUTO: 38 %
HGB BLD-MCNC: 12.6 G/DL
IMM GRANULOCYTES # BLD AUTO: 0.08 X10(3) UL (ref 0–1)
IMM GRANULOCYTES NFR BLD: 0.5 %
LYMPHOCYTES # BLD AUTO: 1.52 X10(3) UL (ref 1–4)
LYMPHOCYTES NFR BLD AUTO: 8.6 %
MCH RBC QN AUTO: 27.1 PG (ref 26–34)
MCHC RBC AUTO-ENTMCNC: 33.2 G/DL (ref 31–37)
MCV RBC AUTO: 81.7 FL
MONOCYTES # BLD AUTO: 1.28 X10(3) UL (ref 0.1–1)
MONOCYTES NFR BLD AUTO: 7.2 %
NEUTROPHILS # BLD AUTO: 14.74 X10 (3) UL (ref 1.5–7.7)
NEUTROPHILS # BLD AUTO: 14.74 X10(3) UL (ref 1.5–7.7)
NEUTROPHILS NFR BLD AUTO: 83.5 %
OSMOLALITY SERPL CALC.SUM OF ELEC: 294 MOSM/KG (ref 275–295)
PLATELET # BLD AUTO: 320 10(3)UL (ref 150–450)
POTASSIUM SERPL-SCNC: 3.8 MMOL/L (ref 3.5–5.1)
PROT SERPL-MCNC: 8.3 G/DL (ref 6.4–8.2)
RBC # BLD AUTO: 4.65 X10(6)UL
SODIUM SERPL-SCNC: 141 MMOL/L (ref 136–145)
WBC # BLD AUTO: 17.7 X10(3) UL (ref 4–11)

## 2023-08-12 PROCEDURE — C9113 INJ PANTOPRAZOLE SODIUM, VIA: HCPCS | Performed by: STUDENT IN AN ORGANIZED HEALTH CARE EDUCATION/TRAINING PROGRAM

## 2023-08-12 PROCEDURE — 85025 COMPLETE CBC W/AUTO DIFF WBC: CPT | Performed by: HOSPITALIST

## 2023-08-12 PROCEDURE — 80053 COMPREHEN METABOLIC PANEL: CPT | Performed by: HOSPITALIST

## 2023-08-12 PROCEDURE — 82962 GLUCOSE BLOOD TEST: CPT

## 2023-08-12 RX ORDER — PANTOPRAZOLE SODIUM 40 MG/1
40 TABLET, DELAYED RELEASE ORAL
Status: DISCONTINUED | OUTPATIENT
Start: 2023-08-12 | End: 2023-08-12

## 2023-08-12 RX ORDER — ONDANSETRON 4 MG/1
4 TABLET, ORALLY DISINTEGRATING ORAL EVERY 4 HOURS PRN
Qty: 30 TABLET | Refills: 0 | Status: SHIPPED | OUTPATIENT
Start: 2023-08-12

## 2023-08-12 RX ORDER — ACETAMINOPHEN 500 MG
500 TABLET ORAL EVERY 4 HOURS PRN
Status: DISCONTINUED | OUTPATIENT
Start: 2023-08-12 | End: 2023-08-12

## 2023-08-12 RX ORDER — ENEMA 19; 7 G/133ML; G/133ML
1 ENEMA RECTAL ONCE AS NEEDED
Status: DISCONTINUED | OUTPATIENT
Start: 2023-08-12 | End: 2023-08-12

## 2023-08-12 RX ORDER — NICOTINE POLACRILEX 4 MG
30 LOZENGE BUCCAL
Status: DISCONTINUED | OUTPATIENT
Start: 2023-08-12 | End: 2023-08-12

## 2023-08-12 RX ORDER — MORPHINE SULFATE 2 MG/ML
2 INJECTION, SOLUTION INTRAMUSCULAR; INTRAVENOUS EVERY 2 HOUR PRN
Status: DISCONTINUED | OUTPATIENT
Start: 2023-08-12 | End: 2023-08-12

## 2023-08-12 RX ORDER — ONDANSETRON 2 MG/ML
4 INJECTION INTRAMUSCULAR; INTRAVENOUS EVERY 6 HOURS PRN
Status: DISCONTINUED | OUTPATIENT
Start: 2023-08-12 | End: 2023-08-12

## 2023-08-12 RX ORDER — ENOXAPARIN SODIUM 100 MG/ML
40 INJECTION SUBCUTANEOUS DAILY
Status: DISCONTINUED | OUTPATIENT
Start: 2023-08-12 | End: 2023-08-12

## 2023-08-12 RX ORDER — MORPHINE SULFATE 2 MG/ML
1 INJECTION, SOLUTION INTRAMUSCULAR; INTRAVENOUS EVERY 2 HOUR PRN
Status: DISCONTINUED | OUTPATIENT
Start: 2023-08-12 | End: 2023-08-12

## 2023-08-12 RX ORDER — SENNOSIDES 8.6 MG
17.2 TABLET ORAL NIGHTLY PRN
Status: DISCONTINUED | OUTPATIENT
Start: 2023-08-12 | End: 2023-08-12

## 2023-08-12 RX ORDER — DEXTROSE MONOHYDRATE 25 G/50ML
50 INJECTION, SOLUTION INTRAVENOUS
Status: DISCONTINUED | OUTPATIENT
Start: 2023-08-12 | End: 2023-08-12

## 2023-08-12 RX ORDER — METOCLOPRAMIDE HYDROCHLORIDE 5 MG/ML
10 INJECTION INTRAMUSCULAR; INTRAVENOUS EVERY 8 HOURS PRN
Status: DISCONTINUED | OUTPATIENT
Start: 2023-08-12 | End: 2023-08-12

## 2023-08-12 RX ORDER — POLYETHYLENE GLYCOL 3350 17 G/17G
17 POWDER, FOR SOLUTION ORAL DAILY PRN
Status: DISCONTINUED | OUTPATIENT
Start: 2023-08-12 | End: 2023-08-12

## 2023-08-12 RX ORDER — SODIUM CHLORIDE 9 MG/ML
INJECTION, SOLUTION INTRAVENOUS CONTINUOUS
Status: DISCONTINUED | OUTPATIENT
Start: 2023-08-12 | End: 2023-08-12

## 2023-08-12 RX ORDER — AMLODIPINE BESYLATE 5 MG/1
10 TABLET ORAL DAILY
Status: DISCONTINUED | OUTPATIENT
Start: 2023-08-12 | End: 2023-08-12

## 2023-08-12 RX ORDER — DIPHENHYDRAMINE HYDROCHLORIDE AND LIDOCAINE HYDROCHLORIDE AND ALUMINUM HYDROXIDE AND MAGNESIUM HYDRO
10 KIT ONCE
Status: DISCONTINUED | OUTPATIENT
Start: 2023-08-12 | End: 2023-08-12

## 2023-08-12 RX ORDER — BISACODYL 10 MG
10 SUPPOSITORY, RECTAL RECTAL
Status: DISCONTINUED | OUTPATIENT
Start: 2023-08-12 | End: 2023-08-12

## 2023-08-12 RX ORDER — NICOTINE POLACRILEX 4 MG
15 LOZENGE BUCCAL
Status: DISCONTINUED | OUTPATIENT
Start: 2023-08-12 | End: 2023-08-12

## 2023-08-12 RX ORDER — MORPHINE SULFATE 4 MG/ML
4 INJECTION, SOLUTION INTRAMUSCULAR; INTRAVENOUS EVERY 2 HOUR PRN
Status: DISCONTINUED | OUTPATIENT
Start: 2023-08-12 | End: 2023-08-12

## 2023-08-12 NOTE — PLAN OF CARE
A&Ox4. VSS. Weaned off O2 from ED, tolerating well. . Denies chest pain and SOB. GI: Abdomen soft, nondistended, mildly rounded. Nontender on palpation. Active bowel sounds. Denies flatus. Patient took laxative prior to admission, no bowel movement on assessment. Nausea managed per MAR. : Voids. Pain controlled with PRN pain medications. Up SBA. Drains: None  Incisions: None   Diet: NPO. Accuchecks  IVF running per order. All appropriate safety measures in place. All questions and concerns addressed.

## 2023-08-12 NOTE — PLAN OF CARE
Patient ambulated halls. Tolerating diet. Denies nausea. Patient states ready for discharge. Patients IV d'c/d, catheter intact. All discharge instructions explained, all questions answered. Patient discharged to [facility] lobby with support staff, UBER picking patient up.

## 2023-08-12 NOTE — DISCHARGE INSTRUCTIONS
Continue with diet as tolerated. Follow up with your primary care doctor. If you develop worsening abdominal pain or are unable to tolerate a diet, call your doctor.

## 2023-08-12 NOTE — PROGRESS NOTES
NURSING ADMISSION NOTE      Patient admitted via Cart  Oriented to room. Safety precautions initiated. Bed in low position. Call light in reach. Patient alert and oriented x4, nausea present.

## 2023-08-12 NOTE — ED PROVIDER NOTES
Patient Seen in: [facility] Emergency Department      History   Patient presents with:  Nausea/Vomiting/Diarrhea    Stated Complaint: nvd    Subjective:   HPI    Patient had progressed abdominal pain/cramping throughout the day today and then started vomiting. The 5 times in total.  One of the episodes of vomiting was blood-tinged. No diarrhea no melena    No fevers no chills    Objective:   Past Medical History:   Diagnosis Date    Cancer ([facility] [address]     renal    Diabetes ([facility] [address]     Diverticulitis     Diverticulosis of large intestine     Essential hypertension               Past Surgical History:   Procedure Laterality Date    COLONOSCOPY N/A 6/16/2019    Procedure: COLONOSCOPY;  Surgeon: Nya Celis MD;  Location: [facility] ENDOSCOPY    KIDNEY SURGERY      1/3 kidney removed    OTHER      partial nephrectomy of right kidney due to cancer                Social History     Socioeconomic History    Marital status: Single   Tobacco Use    Smoking status: Never    Smokeless tobacco: Never   Vaping Use    Vaping Use: Never used   Substance and Sexual Activity    Alcohol use: No    Drug use: Yes     Types: Cannabis     Comment: occ              Review of Systems    Positive for stated complaint: nvd  Other systems are as noted in HPI. Constitutional and vital signs reviewed. All other systems reviewed and negative except as noted above. Physical Exam     ED Triage Vitals   BP 08/11/23 1730 158/63   Pulse 08/11/23 1730 72   Resp 08/11/23 1730 16   Temp --    Temp src --    SpO2 08/11/23 1730 96 %   O2 Device 08/11/23 2030 None (Room air)       Current:/62   Pulse 97   Resp 17   SpO2 92%         Physical Exam      Constitutional: Awake, alert, age appearing, non-toxic  Head: Normocephalic and atraumatic. Eyes: EOM are normal. Pupils are equal, round, and reactive to light. Neck: Normal range of motion. Neck supple. No JVD present. Cardiovascular: Normal rate and regular rhythm.   Normal peripheral perfusion with good color. Pulmonary/Chest: Normal effort. No accessory muscle use. No clubbing, no cyanosis. Abdominal: Soft. There is no tenderness. There is no guarding. Slightly distended    Musculoskeletal: No swelling, deformity or ecchymosis. Neurological: Pt is alert and oriented to person, place, and time. No cranial nerve deficit. Skin: Skin is warm and dry. Psychiatric: Normal mood and affect. Thought content normal.       ED Course     Labs Reviewed   COMP METABOLIC PANEL (14) - Abnormal; Notable for the following components:       Result Value    Glucose 225 (*)     CO2 20.0 (*)     Total Protein 9.0 (*)     Globulin  4.6 (*)     All other components within normal limits   URINALYSIS WITH CULTURE REFLEX - Abnormal; Notable for the following components:    Spec Gravity >1.030 (*)     Glucose Urine 150 (*)     Ketones Urine 20 (*)     All other components within normal limits   CBC W/ DIFFERENTIAL - Abnormal; Notable for the following components:    WBC 20.9 (*)     Neutrophil Absolute Prelim 17.77 (*)     Neutrophil Absolute 17.77 (*)     Monocyte Absolute 1.03 (*)     All other components within normal limits   TROPONIN I HIGH SENSITIVITY - Normal   SARS-COV-2/FLU A AND B/RSV BY PCR (GENEXPERT) - Normal    Narrative: This test is intended for the qualitative detection and differentiation of SARS-CoV-2, influenza A, influenza B, and respiratory syncytial virus (RSV) viral RNA in nasopharyngeal or nares swabs from individuals suspected of respiratory viral infection consistent with COVID-19 by their healthcare provider. Signs and symptoms of respiratory viral infection due to SARS-CoV-2, influenza, and RSV can be similar. Test performed using the Xpert Xpress SARS-CoV-2/FLU/RSV (real time RT-PCR)  assay on the [address] [place] [address], [address] [facility], [address] [place] [address]. This test is being used under the Food and Drug Administration's Emergency Use Authorization.     The authorized Fact Sheet for Healthcare Providers for this assay is available upon request from the laboratory. CBC WITH DIFFERENTIAL WITH PLATELET    Narrative: The following orders were created for panel order CBC With Differential With Platelet. Procedure                               Abnormality         Status                     ---------                               -----------         ------                     CBC W/ DIFFERENTIAL[[id number]]          Abnormal            Final result                 Please view results for these tests on the individual orders. LACTIC ACID, PLASMA   LIPASE   RAINBOW DRAW LAVENDER   RAINBOW DRAW LIGHT GREEN   RAPID SARS-COV-2 BY PCR     EKG    Rate, intervals and axes as noted on EKG Report. Rate: 90  Rhythm: Sinus Rhythm  Reading: Sinus rhythm without acute ischemia              Blood work notable for elevated white count. Troponin negative UA clean shows dehydration COVID-negative CMP shows low bicarb      We will add on lipase         MDM      Patient presents with nausea vomiting abdominal pain    Differential diagnoses considered include gastritis gastroenteritis, surgical emergency such as appendicitis, acute cholecystitis perforated viscus also considered. CT scan is actually fairly unremarkable. I discussed the CT scan result with Dr. Myron Sams the radiologist.  Regarding the mesenteric inflammation, he feels is a probably chronic process and nothing acute, nothing that explain the patient's pain today. I did discuss the possibility of ischemia and in a second doing of the skin he feels the major  arteries of the abdomen are all patent. Chest x-ray shows no acute processes. I obtained EKG and troponin to rule out atypical presentation of ACS and this is not suggested. Patient still unable to tolerate p.o. Will need admission to the hospital for hydration and antiemetics.     Patient will be admitted primarily to the [facility].              *My independent interpretation of radiographs: No pneumonia or CHF on chest x-ray. No free air or free fluid on CT scan of abdomen pelvis    *Discussion of ongoing management of this patient's care included: Patient will be admitted primarily to the [facility].        Shared decision making was done by: Patient, myself, admitting team  Admission disposition: 8/11/2023  9:20 PM                                        Medical Decision Making      Disposition and Plan     Clinical Impression:  Nausea and vomiting in adult  (primary encounter diagnosis)     Disposition:  Admit  8/11/2023  9:20 pm    Follow-up:  No follow-up provider specified.         Medications Prescribed:  Current Discharge Medication List                          Hospital Problems       Present on Admission  Date Reviewed: 2/16/2022            ICD-10-CM Noted POA    * (Principal) Nausea and vomiting in adult R11.2 8/11/2023 Unknown

## 2023-08-12 NOTE — DISCHARGE SUMMARY
Admit/discharge date: 8/11-12/2023    See same day HPI for details    71year old male with PMH sig for hypertension, diabetes mellitus type 2, history of diverticulitis, renal cell cancer status post partial resection of right kidney presented with nausea, vomiting. Nausea, vomiting likely from viral gastroenteritis  - check wbc stools, GI path panel - pending  - supportive cares  - prn antiemetics  - isotonic crystalloids  - CLD, ADAT    Leukocytosis  - reactive from above    Essential HTN - resume amlodipine  DM 2 - ISS/accucheks  Hx RCC s/p R kidney resection    FEN: CLD, PT/OT  Proph: SCDs, lovenox  Code status: Full code    Outpatient records or previous hospital records reviewed.    [facility] hospitalist to continue to follow patient while in house    Dispo - WBC trending down, if tolerating diet can dc later today

## 2023-08-12 NOTE — PLAN OF CARE
Patient ambulating in halls. Denies chest/calf pain. Abdominal pain 8/10, medicated per MAR. Denies nausea at present time. Will start clear liquid diet. POC discussed, all questions and concerns addressed. All safety measures in place. Problem: PAIN - ADULT  Goal: Verbalizes/displays adequate comfort level or patient's stated pain goal  Description: INTERVENTIONS:  - Encourage pt to monitor pain and request assistance  - Assess pain using appropriate pain scale  - Administer analgesics based on type and severity of pain and evaluate response  - Implement non-pharmacological measures as appropriate and evaluate response  - Consider cultural and social influences on pain and pain management  - Manage/alleviate anxiety  - Utilize distraction and/or relaxation techniques  - Monitor for opioid side effects  - Notify MD/LIP if interventions unsuccessful or patient reports new pain  - Anticipate increased pain with activity and pre-medicate as appropriate  Outcome: Progressing     Problem: RISK FOR INFECTION - ADULT  Goal: Absence of fever/infection during anticipated neutropenic period  Description: INTERVENTIONS  - Monitor WBC  - Administer growth factors as ordered  - Implement neutropenic guidelines  Outcome: Progressing     Problem: SAFETY ADULT - FALL  Goal: Free from fall injury  Description: INTERVENTIONS:  - Assess pt frequently for physical needs  - Identify cognitive and physical deficits and behaviors that affect risk of falls.   - [place] fall precautions as indicated by assessment.  - Educate pt/family on patient safety including physical limitations  - Instruct pt to call for assistance with activity based on assessment  - Modify environment to reduce risk of injury  - Provide assistive devices as appropriate  - Consider OT/PT consult to assist with strengthening/mobility  - Encourage toileting schedule  Outcome: Progressing     Problem: DISCHARGE PLANNING  Goal: Discharge to home or other facility with appropriate resources  Description: INTERVENTIONS:  - Identify barriers to discharge w/pt and caregiver  - Include patient/family/discharge partner in discharge planning  - Arrange for needed discharge resources and transportation as appropriate  - Identify discharge learning needs (meds, wound care, etc)  - Arrange for interpreters to assist at discharge as needed  - Consider post-discharge preferences of patient/family/discharge partner  - Complete POLST form as appropriate  - Assess patient's ability to be responsible for managing their own health  - Refer to Case Management Department for coordinating discharge planning if the patient needs post-hospital services based on physician/LIP order or complex needs related to functional status, cognitive ability or social support system  Outcome: Progressing     Problem: Patient/Family Goals  Goal: Patient/Family Long Term Goal  Description: Patient's Long Term Goal: Discharge, return to GI/ baseline, remain free of infection    Interventions:  - Ambulation, IS  - Further workup  - IVF, pain management, antiemetics  - Monitor for signs of infection  - See additional Care Plan goals for specific interventions  Outcome: Progressing  Goal: Patient/Family Short Term Goal  Description: Patient's Short Term Goal: Pain management, nausea management, blood sugar management    Interventions:   - IVF  - PPI proph  - Verbalize pain, understand pain management regimen  - Antiemetics PRN  - Insulin management and education  - See additional Care Plan goals for specific interventions  Outcome: Progressing

## 2023-08-12 NOTE — ED QUICK NOTES
..Orders for admission, patient is aware of plan and ready to go upstairs. Any questions, please call ED FLORIAN lopez  at extension [phone]. Vaccinated?  yes  Type of COVID test sent: rapid  COVID Suspicion level: Low      Titratable drug(s) infusing:  Rate:    LOC at time of transport: a/ox3    Other pertinent information: morphine, zofran given, pain 5/10    CIWA score=  NIH score=

## 2023-08-13 LAB
ATRIAL RATE: 90 BPM
P AXIS: 43 DEGREES
P-R INTERVAL: 196 MS
Q-T INTERVAL: 358 MS
QRS DURATION: 86 MS
QTC CALCULATION (BEZET): 437 MS
R AXIS: -20 DEGREES
T AXIS: 53 DEGREES
VENTRICULAR RATE: 90 BPM

## 2024-02-26 ENCOUNTER — HOSPITAL ENCOUNTER (EMERGENCY)
Facility: HOSPITAL | Age: 74
Discharge: HOME | End: 2024-02-26
Attending: EMERGENCY MEDICINE
Payer: MEDICARE

## 2024-02-26 VITALS
TEMPERATURE: 98 F | RESPIRATION RATE: 16 BRPM | HEART RATE: 90 BPM | DIASTOLIC BLOOD PRESSURE: 75 MMHG | WEIGHT: 190 LBS | BODY MASS INDEX: 28.79 KG/M2 | HEIGHT: 68 IN | OXYGEN SATURATION: 97 % | SYSTOLIC BLOOD PRESSURE: 167 MMHG

## 2024-02-26 DIAGNOSIS — K08.89 PAIN, DENTAL: Primary | ICD-10-CM

## 2024-02-26 PROCEDURE — 99284 EMERGENCY DEPT VISIT MOD MDM: CPT

## 2024-02-26 PROCEDURE — 99283 EMERGENCY DEPT VISIT LOW MDM: CPT

## 2024-02-26 RX ORDER — AMOXICILLIN AND CLAVULANATE POTASSIUM 875; 125 MG/1; MG/1
875 TABLET, FILM COATED ORAL ONCE
Status: COMPLETED | OUTPATIENT
Start: 2024-02-26 | End: 2024-02-26

## 2024-02-26 RX ORDER — HYDROCODONE BITARTRATE AND ACETAMINOPHEN 5; 325 MG/1; MG/1
1-2 TABLET ORAL EVERY 6 HOURS PRN
Qty: 10 TABLET | Refills: 0 | Status: SHIPPED | OUTPATIENT
Start: 2024-02-26 | End: 2024-03-04

## 2024-02-26 RX ORDER — AMOXICILLIN AND CLAVULANATE POTASSIUM 875; 125 MG/1; MG/1
1 TABLET, FILM COATED ORAL 2 TIMES DAILY
Qty: 20 TABLET | Refills: 0 | Status: SHIPPED | OUTPATIENT
Start: 2024-02-26 | End: 2024-02-28

## 2024-02-26 NOTE — ED PROVIDER NOTES
Patient Seen in: [facility] Emergency Department      History     Chief Complaint   Patient presents with    Dental Problem     Stated Complaint: tooth pain at 4am    Subjective:   HPI    73-year-old male presenting emerged part for tooth pain.  Patient is having problems with his right upper molar having pain he had pain in the department the past 8 months he is going to try to see his dentist today but he was having trouble with the pain prompting his visit here denies any other complaints    Objective:   Past Medical History:   Diagnosis Date    Cancer (HCC)     renal    Diabetes (H[facility])     Diverticulitis     Diverticulosis of large intestine     Essential hypertension               Past Surgical History:   Procedure Laterality Date    COLONOSCOPY N/A 6/16/2019    Procedure: COLONOSCOPY;  Surgeon: Won Rashid MD;  Location:  ENDOSCOPY    KIDNEY SURGERY      1/3 kidney removed    OTHER      partial nephrectomy of right kidney due to cancer                Social History     Socioeconomic History    Marital status: Single   Tobacco Use    Smoking status: Never    Smokeless tobacco: Never   Vaping Use    Vaping Use: Never used   Substance and Sexual Activity    Alcohol use: No    Drug use: Yes     Types: Cannabis     Comment: occ              Review of Systems    Positive for stated complaint: tooth pain at 4am  Other systems are as noted in HPI.  Constitutional and vital signs reviewed.      All other systems reviewed and negative except as noted above.    Physical Exam     ED Triage Vitals [02/26/24 0401]   BP (!) 167/75   Pulse 90   Resp 16   Temp 97.5 °F (36.4 °C)   Temp src Temporal   SpO2 97 %   O2 Device None (Room air)       Current:BP (!) 167/75   Pulse 90   Temp 97.5 °F (36.4 °C) (Temporal)   Resp 16   Ht 172.7 cm (5' 8\")   Wt 86.2 kg   SpO2 97%   BMI 28.89 kg/m²         Physical Exam  Awake alert patient appears no distress poor dentition tooth #2 and 3 no gingival swelling no tongue  elevation no trismus no uvula edema or shift no lymphadenopathy no nuchal rigidity no submandibular erythema lungs are clear cardiovascular exam regular rate and rhythm abdomen soft nontender extremities no clubbing cyanosis or edema no rash over the face no focal neurologic deficits on neurologic exam       ED Course   Labs Reviewed - No data to display          Differential diagnosis includes Lemierre's disease, dental abscess         MDM                                         Medical Decision Making  73-year-old male presenting to the emergency department for dental pain.  He states that he has had actually dental pain in the past and this region he was prescribed antibiotic, first dose was given emerged primary as well as prescribed pain medication will be discharged home is to return to the emergency department for worsening symptoms other complaints  The patient was screened and evaluated during this visit.  As a treating physician attending to the patient, I determined, within reasonable clinical confidence and prior to discharge, that an emergency medical condition was not or was no longer present.  There was no indication for further evaluation, treatment or admission on an emergency basis.    The usual and customary discharge instructions were discussed given the patient's ER course.  We discussed signs and symptoms that should prompt the patient's immediate return to the emergency department.  Reasonable over-the-counter and prescription treatment options and physician follow-up plan was discussed.  Patient was discharged home in good condition  This note was prepared using [employer] voice recognition dictation software.  As a result errors may occur.  When identified to these areas have been corrected.  While every attempt is made to correct errors during dictation discrepancies may still exist.  Please contact if there are any errors    Problems Addressed:  Pain, dental: acute illness or  injury    Amount and/or Complexity of Data Reviewed  ECG/medicine tests: ordered and independent interpretation performed. Decision-making details documented in ED Course.        Disposition and Plan     Clinical Impression:  1. Pain, dental         Disposition:  Discharge  2/26/2024  4:46 am    Follow-up:  Alec Soto MD  [address] [place] [address]  [phone]    Follow up in 1 week(s)            Medications Prescribed:  Current Discharge Medication List        START taking these medications    Details   HYDROcodone-acetaminophen 5-325 MG Oral Tab Take 1-2 tablets by mouth every 6 (six) hours as needed.  Qty: 10 tablet, Refills: 0    Associated Diagnoses: Pain, dental      amoxicillin clavulanate 875-125 MG Oral Tab Take 1 tablet by mouth 2 (two) times daily for 10 days.  Qty: 20 tablet, Refills: 0    Associated Diagnoses: Pain, dental

## 2024-02-26 NOTE — ED INITIAL ASSESSMENT (HPI)
Patient here with c/o right sided dental pain.  Patient took tylenol 2 hours ago without relief.  Patient reports he is trying to get into the dentist today.

## 2024-02-27 ENCOUNTER — APPOINTMENT (OUTPATIENT)
Dept: GENERAL RADIOLOGY | Facility: HOSPITAL | Age: 74
End: 2024-02-27
Attending: EMERGENCY MEDICINE
Payer: MEDICARE

## 2024-02-27 ENCOUNTER — HOSPITAL ENCOUNTER (OUTPATIENT)
Facility: HOSPITAL | Age: 74
Setting detail: OBSERVATION
Discharge: HOME | End: 2024-02-28
Attending: EMERGENCY MEDICINE | Admitting: INTERNAL MEDICINE
Payer: MEDICARE

## 2024-02-27 DIAGNOSIS — N28.9 RENAL INSUFFICIENCY: ICD-10-CM

## 2024-02-27 DIAGNOSIS — R11.2 NAUSEA AND VOMITING, UNSPECIFIED VOMITING TYPE: Primary | ICD-10-CM

## 2024-02-27 LAB
ALBUMIN SERPL-MCNC: 4.6 G/DL (ref 3.4–5)
ALBUMIN/GLOB SERPL: 0.9 {RATIO} (ref 1–2)
ALP LIVER SERPL-CCNC: 117 U/L
ALT SERPL-CCNC: 31 U/L
ANION GAP SERPL CALC-SCNC: 8 MMOL/L (ref 0–18)
AST SERPL-CCNC: 20 U/L (ref 15–37)
ATRIAL RATE: 112 BPM
BASOPHILS # BLD AUTO: 0.05 X10(3) UL (ref 0–0.2)
BASOPHILS NFR BLD AUTO: 0.2 %
BILIRUB SERPL-MCNC: 0.8 MG/DL (ref 0.1–2)
BUN BLD-MCNC: 14 MG/DL (ref 9–23)
CALCIUM BLD-MCNC: 10.6 MG/DL (ref 8.5–10.1)
CHLORIDE SERPL-SCNC: 104 MMOL/L (ref 98–112)
CO2 SERPL-SCNC: 23 MMOL/L (ref 21–32)
CREAT BLD-MCNC: 1.51 MG/DL
EGFRCR SERPLBLD CKD-EPI 2021: 49 ML/MIN/1.73M2 (ref 60–?)
EOSINOPHIL # BLD AUTO: 0 X10(3) UL (ref 0–0.7)
EOSINOPHIL NFR BLD AUTO: 0 %
ERYTHROCYTE [DISTWIDTH] IN BLOOD BY AUTOMATED COUNT: 15.1 %
GLOBULIN PLAS-MCNC: 5.4 G/DL (ref 2.8–4.4)
GLUCOSE BLD-MCNC: 229 MG/DL (ref 70–99)
HCT VFR BLD AUTO: 44.3 %
HGB BLD-MCNC: 14.7 G/DL
IMM GRANULOCYTES # BLD AUTO: 0.14 X10(3) UL (ref 0–1)
IMM GRANULOCYTES NFR BLD: 0.6 %
LYMPHOCYTES # BLD AUTO: 1.46 X10(3) UL (ref 1–4)
LYMPHOCYTES NFR BLD AUTO: 6.1 %
MCH RBC QN AUTO: 27.1 PG (ref 26–34)
MCHC RBC AUTO-ENTMCNC: 33.2 G/DL (ref 31–37)
MCV RBC AUTO: 81.6 FL
MONOCYTES # BLD AUTO: 1.67 X10(3) UL (ref 0.1–1)
MONOCYTES NFR BLD AUTO: 7 %
NEUTROPHILS # BLD AUTO: 20.42 X10 (3) UL (ref 1.5–7.7)
NEUTROPHILS # BLD AUTO: 20.42 X10(3) UL (ref 1.5–7.7)
NEUTROPHILS NFR BLD AUTO: 86.1 %
OSMOLALITY SERPL CALC.SUM OF ELEC: 288 MOSM/KG (ref 275–295)
P AXIS: 42 DEGREES
P-R INTERVAL: 174 MS
PLATELET # BLD AUTO: 418 10(3)UL (ref 150–450)
POTASSIUM SERPL-SCNC: 3.7 MMOL/L (ref 3.5–5.1)
PROT SERPL-MCNC: 10 G/DL (ref 6.4–8.2)
Q-T INTERVAL: 324 MS
QRS DURATION: 74 MS
QTC CALCULATION (BEZET): 442 MS
R AXIS: -13 DEGREES
RBC # BLD AUTO: 5.43 X10(6)UL
SODIUM SERPL-SCNC: 135 MMOL/L (ref 136–145)
T AXIS: 59 DEGREES
TROPONIN I SERPL HS-MCNC: 30 NG/L
TROPONIN I SERPL HS-MCNC: 32 NG/L
VENTRICULAR RATE: 112 BPM
WBC # BLD AUTO: 23.7 X10(3) UL (ref 4–11)

## 2024-02-27 PROCEDURE — 84484 ASSAY OF TROPONIN QUANT: CPT | Performed by: EMERGENCY MEDICINE

## 2024-02-27 PROCEDURE — 80053 COMPREHEN METABOLIC PANEL: CPT | Performed by: EMERGENCY MEDICINE

## 2024-02-27 PROCEDURE — 96374 THER/PROPH/DIAG INJ IV PUSH: CPT

## 2024-02-27 PROCEDURE — 99285 EMERGENCY DEPT VISIT HI MDM: CPT

## 2024-02-27 PROCEDURE — 93005 ELECTROCARDIOGRAM TRACING: CPT

## 2024-02-27 PROCEDURE — 96376 TX/PRO/DX INJ SAME DRUG ADON: CPT

## 2024-02-27 PROCEDURE — 74019 RADEX ABDOMEN 2 VIEWS: CPT | Performed by: EMERGENCY MEDICINE

## 2024-02-27 PROCEDURE — 93010 ELECTROCARDIOGRAM REPORT: CPT

## 2024-02-27 PROCEDURE — 96375 TX/PRO/DX INJ NEW DRUG ADDON: CPT

## 2024-02-27 PROCEDURE — 85025 COMPLETE CBC W/AUTO DIFF WBC: CPT | Performed by: EMERGENCY MEDICINE

## 2024-02-27 PROCEDURE — 71045 X-RAY EXAM CHEST 1 VIEW: CPT | Performed by: EMERGENCY MEDICINE

## 2024-02-27 PROCEDURE — 84484 ASSAY OF TROPONIN QUANT: CPT | Performed by: INTERNAL MEDICINE

## 2024-02-27 PROCEDURE — 96361 HYDRATE IV INFUSION ADD-ON: CPT

## 2024-02-27 PROCEDURE — 93010 ELECTROCARDIOGRAM REPORT: CPT | Performed by: INTERNAL MEDICINE

## 2024-02-27 RX ORDER — ONDANSETRON 2 MG/ML
4 INJECTION INTRAMUSCULAR; INTRAVENOUS ONCE
Status: COMPLETED | OUTPATIENT
Start: 2024-02-27 | End: 2024-02-27

## 2024-02-27 RX ORDER — SODIUM CHLORIDE 9 MG/ML
INJECTION, SOLUTION INTRAVENOUS CONTINUOUS
Status: DISCONTINUED | OUTPATIENT
Start: 2024-02-27 | End: 2024-02-28

## 2024-02-27 RX ORDER — ONDANSETRON 2 MG/ML
4 INJECTION INTRAMUSCULAR; INTRAVENOUS EVERY 6 HOURS PRN
Status: DISCONTINUED | OUTPATIENT
Start: 2024-02-27 | End: 2024-02-28

## 2024-02-27 RX ORDER — MORPHINE SULFATE 2 MG/ML
2 INJECTION, SOLUTION INTRAMUSCULAR; INTRAVENOUS EVERY 2 HOUR PRN
Status: DISCONTINUED | OUTPATIENT
Start: 2024-02-27 | End: 2024-02-28

## 2024-02-27 RX ORDER — PANTOPRAZOLE SODIUM 40 MG/1
40 TABLET, DELAYED RELEASE ORAL DAILY
Status: DISCONTINUED | OUTPATIENT
Start: 2024-02-27 | End: 2024-02-28

## 2024-02-27 RX ORDER — PANTOPRAZOLE SODIUM 40 MG/1
40 TABLET, DELAYED RELEASE ORAL
Status: DISCONTINUED | OUTPATIENT
Start: 2024-02-27 | End: 2024-02-27

## 2024-02-27 RX ORDER — MORPHINE SULFATE 2 MG/ML
1 INJECTION, SOLUTION INTRAMUSCULAR; INTRAVENOUS EVERY 2 HOUR PRN
Status: DISCONTINUED | OUTPATIENT
Start: 2024-02-27 | End: 2024-02-28

## 2024-02-27 RX ORDER — MAGNESIUM HYDROXIDE/ALUMINUM HYDROXICE/SIMETHICONE 120; 1200; 1200 MG/30ML; MG/30ML; MG/30ML
30 SUSPENSION ORAL 4 TIMES DAILY PRN
Status: DISCONTINUED | OUTPATIENT
Start: 2024-02-27 | End: 2024-02-28

## 2024-02-27 RX ORDER — ASPIRIN 325 MG
325 TABLET ORAL ONCE
Status: COMPLETED | OUTPATIENT
Start: 2024-02-27 | End: 2024-02-27

## 2024-02-27 RX ORDER — HYDRALAZINE HYDROCHLORIDE 20 MG/ML
10 INJECTION INTRAMUSCULAR; INTRAVENOUS EVERY 6 HOURS PRN
Status: DISCONTINUED | OUTPATIENT
Start: 2024-02-27 | End: 2024-02-28

## 2024-02-27 RX ORDER — ACETAMINOPHEN 500 MG
500 TABLET ORAL EVERY 4 HOURS PRN
Status: DISCONTINUED | OUTPATIENT
Start: 2024-02-27 | End: 2024-02-28

## 2024-02-27 RX ORDER — HEPARIN SODIUM 5000 [USP'U]/ML
5000 INJECTION, SOLUTION INTRAVENOUS; SUBCUTANEOUS EVERY 8 HOURS SCHEDULED
Status: DISCONTINUED | OUTPATIENT
Start: 2024-02-27 | End: 2024-02-28

## 2024-02-27 RX ORDER — MORPHINE SULFATE 4 MG/ML
4 INJECTION, SOLUTION INTRAMUSCULAR; INTRAVENOUS ONCE
Status: COMPLETED | OUTPATIENT
Start: 2024-02-27 | End: 2024-02-27

## 2024-02-27 RX ORDER — OMEPRAZOLE 20 MG/1
20 CAPSULE, DELAYED RELEASE ORAL DAILY PRN
COMMUNITY

## 2024-02-27 RX ORDER — TRAMADOL HYDROCHLORIDE 50 MG/1
50 TABLET ORAL EVERY 6 HOURS PRN
Status: DISCONTINUED | OUTPATIENT
Start: 2024-02-27 | End: 2024-02-28

## 2024-02-27 RX ORDER — MORPHINE SULFATE 2 MG/ML
0.5 INJECTION, SOLUTION INTRAMUSCULAR; INTRAVENOUS EVERY 2 HOUR PRN
Status: DISCONTINUED | OUTPATIENT
Start: 2024-02-27 | End: 2024-02-28

## 2024-02-27 RX ORDER — AMLODIPINE BESYLATE 10 MG/1
10 TABLET ORAL DAILY
Status: DISCONTINUED | OUTPATIENT
Start: 2024-02-27 | End: 2024-02-28

## 2024-02-27 RX ORDER — METOCLOPRAMIDE HYDROCHLORIDE 5 MG/ML
10 INJECTION INTRAMUSCULAR; INTRAVENOUS EVERY 8 HOURS PRN
Status: DISCONTINUED | OUTPATIENT
Start: 2024-02-27 | End: 2024-02-28

## 2024-02-27 NOTE — ED INITIAL ASSESSMENT (HPI)
Patient to ED via EMS c/o nausea and vomiting after taking 1st dose of antibiotic, states he feels like something is stuck in throat but unable to verbalize what, managing secretions in triage/speaking in clear voice

## 2024-02-27 NOTE — ED PROVIDER NOTES
Patient Seen in: [facility] Emergency Department      History     Chief Complaint   Patient presents with    Nausea/vomiting    FB in Throat     Stated Complaint: n/v s/p taking 1 dose of abx    Subjective:   HPI    73-year-old male returning emerged department for vomiting.  Patient reports that he was seen last night but same ED physician was given a dose of Augmentin.  He states that soon after taking it and going home he started having vomiting that he cannot get under control.  Vomitus is nonbloody or bilious.  Has had no diarrhea.  He says he could not keep anything down today has some pain in the throat because of it and denies any other complaints no other exacerbating factors or associated symptoms    Objective:   Past Medical History:   Diagnosis Date    Cancer (HCC)     renal    Diabetes (HCC)     Diverticulitis     Diverticulosis of large intestine     Essential hypertension               Past Surgical History:   Procedure Laterality Date    COLONOSCOPY N/A 6/16/2019    Procedure: COLONOSCOPY;  Surgeon: Won Rashid MD;  Location:  ENDOSCOPY    KIDNEY SURGERY      1/3 kidney removed    OTHER      partial nephrectomy of right kidney due to cancer                Social History     Socioeconomic History    Marital status: Single   Tobacco Use    Smoking status: Never    Smokeless tobacco: Never   Vaping Use    Vaping Use: Never used   Substance and Sexual Activity    Alcohol use: No    Drug use: Yes     Types: Cannabis     Comment: occ              Review of Systems    Positive for stated complaint: n/v s/p taking 1 dose of abx  Other systems are as noted in HPI.  Constitutional and vital signs reviewed.      All other systems reviewed and negative except as noted above.    Physical Exam     ED Triage Vitals [02/27/24 0208]   /88   Pulse (!) 135   Resp 26   Temp 97.3 °F (36.3 °C)   Temp src Temporal   SpO2 100 %   O2 Device None (Room air)       Current:/88   Pulse (!) 135   Temp  97.3 °F (36.3 °C) (Temporal)   Resp 26   Ht 172.7 cm (5' 8\")   Wt 86.2 kg   SpO2 100%   BMI 28.89 kg/m²         Physical Exam    Alert patient appears no distress HEENT exam is normal lungs are clear cardiovascular exam shows regular rhythm abdomen soft nontender extremities no Cyanosis or edema no rash back exam is normal    ED Course     Labs Reviewed   COMP METABOLIC PANEL (14) - Abnormal; Notable for the following components:       Result Value    Glucose 229 (*)     Sodium 135 (*)     Creatinine 1.51 (*)     Calcium, Total 10.6 (*)     eGFR-Cr 49 (*)     Total Protein 10.0 (*)     Globulin  5.4 (*)     A/G Ratio 0.9 (*)     All other components within normal limits   CBC W/ DIFFERENTIAL - Abnormal; Notable for the following components:    WBC 23.7 (*)     Neutrophil Absolute Prelim 20.42 (*)     Neutrophil Absolute 20.42 (*)     Monocyte Absolute 1.67 (*)     All other components within normal limits   CBC WITH DIFFERENTIAL WITH PLATELET    Narrative:     The following orders were created for panel order CBC With Differential With Platelet.  Procedure                               Abnormality         Status                     ---------                               -----------         ------                     CBC W/ DIFFERENTIAL[[id number]]          Abnormal            Final result                 Please view results for these tests on the individual orders.   TROPONIN I HIGH SENSITIVITY   REDRAW CMP (P)   RAINBOW DRAW LAVENDER   RAINBOW DRAW LIGHT GREEN     EKG    Rate, intervals and axes as noted on EKG Report.  Rate: 112  Rhythm: Sinus Rhythm  Reading: No areas of acute ST segment elevation or depression                 Differential diagnosis includes vomiting, acute renal failure         MDM        Admission disposition: 2/27/2024  4:05 AM                                        Medical Decision Making  73-year-old male presenting with vomiting.  IV established cardiac monitor shows sinus tachycardia  pulse ox shows no signs of hypoxia.  CBC shows an elevated white cell count metabolic panel shows renal insufficiency patient was given IV fluids antiemetic medication tachycardia has continued improved but not resolved patient will be admitted for IV hydration and further evaluation serial abdominal exam showed the patient's abdomen be soft nonsurgical with no complaints of pain  This note was prepared using Dragon Medical voice recognition dictation software.  As a result errors may occur.  When identified to these areas have been corrected.  While every attempt is made to correct errors during dictation discrepancies may still exist.  Please contact if there are any errors    Problems Addressed:  Nausea and vomiting, unspecified vomiting type: acute illness or injury  Renal insufficiency: acute illness or injury    Amount and/or Complexity of Data Reviewed  Labs: ordered. Decision-making details documented in ED Course.  ECG/medicine tests: ordered and independent interpretation performed. Decision-making details documented in ED Course.        Disposition and Plan     Clinical Impression:  1. Nausea and vomiting, unspecified vomiting type    2. Renal insufficiency         Disposition:  Admit  2/27/2024  4:05 am    Follow-up:  No follow-up provider specified.        Medications Prescribed:  Current Discharge Medication List                            Hospital Problems       Present on Admission  Date Reviewed: 2/27/2024            ICD-10-CM Noted POA    * (Principal) Nausea and vomiting, unspecified vomiting type R11.2 2/27/2024 Unknown

## 2024-02-27 NOTE — ED QUICK NOTES
Orders for admission, patient is aware of plan and ready to go upstairs. Any questions, please call ED RN Marianela at extension [phone].     Patient Covid vaccination status: Fully vaccinated     COVID Test Ordered in ED: None    COVID Suspicion at Admission: N/A    Running Infusions:    sodium chloride 125 mL/hr at 02/27/24 0614        Mental Status/LOC at time of transport: AO x 4    Other pertinent information:   CIWA score: N/A   NIH score:  N/A

## 2024-02-27 NOTE — H&P
General Medicine H&P     Chief Complaint   Patient presents with    Nausea/vomiting    FB in Throat        PCP: Alec Soto MD    History of Present Illness: Patient is a 73 year old male with PMH including but not limited to DM, HTN, renal ca who p/t [facility] ED c n/v.     Pt notes having a toothache x 1d, came to ED yesterday, got dose abx and went home. At home having n/v. No f/c. Pt notes possible red vomit, >5 vomitus, all night. Nl stool, just had BM. Came back to ED and admitted for obs.     No n/v now. Tooth is also better. Will make dentist appt. 159/77, denies HTN hx         Past Medical History:   Diagnosis Date    Cancer (HCC)     renal    Diabetes (HCC)     Diverticulitis     Diverticulosis of large intestine     Essential hypertension       Past Surgical History:   Procedure Laterality Date    COLONOSCOPY N/A 6/16/2019    Procedure: COLONOSCOPY;  Surgeon: Won Rashid MD;  Location:  ENDOSCOPY    KIDNEY SURGERY      1/3 kidney removed    OTHER      partial nephrectomy of right kidney due to cancer        ALL:  Allergies   Allergen Reactions    Codeine NAUSEA AND VOMITING        amLODIPine, 10 mg, Daily  pantoprazole, 40 mg, Daily  heparin, 5,000 Units, Q8H RADHA  ampicillin-sulbactam, 3 g, Q6H        Social History     Tobacco Use    Smoking status: Never    Smokeless tobacco: Never   Substance Use Topics    Alcohol use: No        Fam Hx  Family History   Problem Relation Age of Onset    Cancer Neg        Review of Systems  Comprehensive ROS reviewed and negative except for what's stated above. \    OBJECTIVE:  /73 (BP Location: Right arm)   Pulse 108   Temp 98.5 °F (36.9 °C) (Temporal)   Resp 16   Ht 5' 8\" (1.727 m)   Wt 190 lb (86.2 kg)   SpO2 95%   BMI 28.89 kg/m²     General:  Alert, no distress, appears stated age.    Head:  Normocephalic, without obvious abnormality, atraumatic.   Eyes:  Sclera anicteric, EOMs intact.    Nose: Nares normal,  Mucosa normal   Teeth: poor dentition     Throat: Lips normal   Neck: Supple, symmetrical, trachea midline   Lungs:   Clear to auscultation bilaterally. Normal effort   Chest wall:  No tenderness or deformity   Heart:  Regular rate and rhythm, S1, S2 normal, no murmur, rub or gallop appreciated   Abdomen:   Soft, NT/ND, Bowel sounds normal. No masses,  No organomegaly.    Extremities/MSK: Extremities normal/normal movement, atraumatic, no cyanosis  or edema.   Skin: Skin color, texture, turgor normal. No rashes or lesions.    Neurologic: Moving all extremities spontaneously, no focal deficit appreciated          LABS:   Lab Results   Component Value Date    WBC 23.7 02/27/2024    HGB 14.7 02/27/2024    HCT 44.3 02/27/2024    .0 02/27/2024    CREATSERUM 1.51 02/27/2024    BUN 14 02/27/2024     02/27/2024    K 3.7 02/27/2024     02/27/2024    CO2 23.0 02/27/2024     02/27/2024    CA 10.6 02/27/2024    ALB 4.6 02/27/2024    ALKPHO 117 02/27/2024    BILT 0.8 02/27/2024    TP 10.0 02/27/2024    AST 20 02/27/2024    ALT 31 02/27/2024       Radiology: XR ABDOMEN OBSTRUCTIVE SERIES ROUTINE(2 VW)(CPT=74019)    Result Date: 2/27/2024  PROCEDURE:  XR ABDOMEN OBSTRUCTIVE SERIES ROUTINE(2 VW)(CPT=74019)  TECHNIQUE:  2 view obstructive series of the abdomen and pelvis were obtained.  COMPARISON:  [facility] , CT, CT ABDOMEN PELVIS IV CONTRAST, NO ORAL (ER), 8/11/2023, 7:39 PM.  INDICATIONS:  n/v s/p taking 1 dose of abx  PATIENT STATED HISTORY: (As transcribed by Technologist)  Patient offered no additional history at this time.    FINDINGS:  BOWEL GAS PATTERN:  Non-specific bowel gas pattern.  There is no evidence of free air. CALCIFICATIONS:  None significant.   Surgical clips in right upper quadrant are noted consistent with prior cholecystectomy.            CONCLUSION:  There is no specific evidence of an acute abnormality on plain radiographs of the abdomen.   LOCATION:  [facility]    Dictated by (CST): Dano Wilson MD on 2/27/2024 at 7:15 AM      Finalized by (CST): Dano Wilson MD on 2/27/2024 at 7:17 AM       XR CHEST AP PORTABLE  (CPT=71045)    Result Date: 2/27/2024  PROCEDURE:  XR CHEST AP PORTABLE  (CPT=71045)  TECHNIQUE:  AP chest radiograph was obtained.  COMPARISON:  [facility] , XR, XR CHEST AP PORTABLE  (CPT=71045), 8/11/2023, 9:01 PM.  INDICATIONS:  n/v s/p taking 1 dose of abx  PATIENT STATED HISTORY: (As transcribed by Technologist)   n/v s/p taking 1 dose of abx    FINDINGS:  Lungs and pleural spaces are clear.  Cardiac size is within normal limits.  Mediastinum and torrey are unremarkable.  Chest wall structures are unremarkable.            CONCLUSION:  There is no evidence of active cardiopulmonary disease on this single portable chest radiograph.  Preliminary report was reviewed and there is no significant discrepancy.   LOCATION:  [facility]      Dictated by (CST): Dano Wilson MD on 2/27/2024 at 7:11 AM     Finalized by (CST): Dano Wilson MD on 2/27/2024 at 7:12 AM            ASSESSMENT / PLAN:    73 year old male with PMH including but not limited to DM, HTN, renal ca who p/t [facility] ED c n/v.     # n/v  -supportive care, IVF, pain/naseua meds  - improving today, likely 2/2 Augmentin although tolerating Unasyn  - CXR and obs series ok  - clears for now, adat     # Elevated BP  # HTN  - 159/77, denies HTN hx (but on norvasc), likely 2/2 n/v/pain, follow     # Acute renal insufficiency  - 2/2 above, IVF     # Tooth pain, infection  - IV Unasyn for now, pt will make dentist appt  - was on Augmentin, denies prior n/v from it if has taken     # GERD  -PPI, wean as o/p if appropriate    # DM  - not on meds at home     # Proph  - sqh    Dispo: obs, hopeful dc tmrw    Outpatient records or previous hospital records reviewed. [facility] hospitalist to continue to follow patient while in house. A total of 75 minutes taken.  Greater than 50% face to face encounter.  D/w RN     Franky Munoz MD  [facility] Hospitalist  Pager:  [phone]  2/27/2024  12:51 PM        Addendum  HR up, some chest/epigastric pain reported. EKG/trop ordered. Give asa. Maalox prn. Prn hydralazine for SBP 190s    Franky Munoz MD  [facility] Hospitalist  [phone]  2/27/2024  3:59 PM

## 2024-02-27 NOTE — ED QUICK NOTES
Pt provided with water per ER MD orders.    Pt only able to take couple sips, states \"my throat hurts, it feels like something is stuck.\"

## 2024-02-28 VITALS
WEIGHT: 190 LBS | HEART RATE: 110 BPM | HEIGHT: 68 IN | DIASTOLIC BLOOD PRESSURE: 75 MMHG | SYSTOLIC BLOOD PRESSURE: 169 MMHG | OXYGEN SATURATION: 96 % | BODY MASS INDEX: 28.79 KG/M2 | RESPIRATION RATE: 19 BRPM | TEMPERATURE: 99 F

## 2024-02-28 LAB
ANION GAP SERPL CALC-SCNC: 4 MMOL/L (ref 0–18)
ATRIAL RATE: 116 BPM
BASOPHILS # BLD AUTO: 0.03 X10(3) UL (ref 0–0.2)
BASOPHILS NFR BLD AUTO: 0.1 %
BUN BLD-MCNC: 7 MG/DL (ref 9–23)
CALCIUM BLD-MCNC: 9.3 MG/DL (ref 8.5–10.1)
CHLORIDE SERPL-SCNC: 110 MMOL/L (ref 98–112)
CO2 SERPL-SCNC: 25 MMOL/L (ref 21–32)
CREAT BLD-MCNC: 0.82 MG/DL
EGFRCR SERPLBLD CKD-EPI 2021: 93 ML/MIN/1.73M2 (ref 60–?)
EOSINOPHIL # BLD AUTO: 0.01 X10(3) UL (ref 0–0.7)
EOSINOPHIL NFR BLD AUTO: 0 %
ERYTHROCYTE [DISTWIDTH] IN BLOOD BY AUTOMATED COUNT: 15.4 %
GLUCOSE BLD-MCNC: 158 MG/DL (ref 70–99)
HCT VFR BLD AUTO: 39.4 %
HGB BLD-MCNC: 12.8 G/DL
IMM GRANULOCYTES # BLD AUTO: 0.14 X10(3) UL (ref 0–1)
IMM GRANULOCYTES NFR BLD: 0.7 %
LYMPHOCYTES # BLD AUTO: 2.66 X10(3) UL (ref 1–4)
LYMPHOCYTES NFR BLD AUTO: 13.1 %
MAGNESIUM SERPL-MCNC: 2.2 MG/DL (ref 1.6–2.6)
MCH RBC QN AUTO: 27.3 PG (ref 26–34)
MCHC RBC AUTO-ENTMCNC: 32.5 G/DL (ref 31–37)
MCV RBC AUTO: 84 FL
MONOCYTES # BLD AUTO: 1.72 X10(3) UL (ref 0.1–1)
MONOCYTES NFR BLD AUTO: 8.5 %
NEUTROPHILS # BLD AUTO: 15.67 X10 (3) UL (ref 1.5–7.7)
NEUTROPHILS # BLD AUTO: 15.67 X10(3) UL (ref 1.5–7.7)
NEUTROPHILS NFR BLD AUTO: 77.6 %
OSMOLALITY SERPL CALC.SUM OF ELEC: 289 MOSM/KG (ref 275–295)
P AXIS: 46 DEGREES
P-R INTERVAL: 166 MS
PLATELET # BLD AUTO: 374 10(3)UL (ref 150–450)
POTASSIUM SERPL-SCNC: 3.5 MMOL/L (ref 3.5–5.1)
PROCALCITONIN SERPL-MCNC: 0.11 NG/ML (ref ?–0.16)
Q-T INTERVAL: 290 MS
QRS DURATION: 82 MS
QTC CALCULATION (BEZET): 403 MS
R AXIS: -20 DEGREES
RBC # BLD AUTO: 4.69 X10(6)UL
SODIUM SERPL-SCNC: 139 MMOL/L (ref 136–145)
T AXIS: 49 DEGREES
TROPONIN I SERPL HS-MCNC: 26 NG/L
VENTRICULAR RATE: 116 BPM
WBC # BLD AUTO: 20.2 X10(3) UL (ref 4–11)

## 2024-02-28 PROCEDURE — 80048 BASIC METABOLIC PNL TOTAL CA: CPT | Performed by: HOSPITALIST

## 2024-02-28 PROCEDURE — 84145 PROCALCITONIN (PCT): CPT | Performed by: INTERNAL MEDICINE

## 2024-02-28 PROCEDURE — 84484 ASSAY OF TROPONIN QUANT: CPT | Performed by: INTERNAL MEDICINE

## 2024-02-28 PROCEDURE — 83735 ASSAY OF MAGNESIUM: CPT | Performed by: HOSPITALIST

## 2024-02-28 PROCEDURE — 85025 COMPLETE CBC W/AUTO DIFF WBC: CPT | Performed by: HOSPITALIST

## 2024-02-28 RX ORDER — CLINDAMYCIN HYDROCHLORIDE 300 MG/1
300 CAPSULE ORAL 3 TIMES DAILY
Qty: 21 CAPSULE | Refills: 0 | Status: SHIPPED | OUTPATIENT
Start: 2024-02-28 | End: 2024-03-06

## 2024-02-28 NOTE — PROGRESS NOTES
NURSING ADMISSION NOTE      Patient admitted via Cart  Oriented to room.  Safety precautions initiated.  Bed in low position.  Call light in reach.    Pt. A&O x4. Admission navigator completed. Able to advance diet to clear liquids. Pt. C/o epigastric and stomach pain; PRNs given per MAR. Pt. With elevated B/P; PRNs given.

## 2024-02-28 NOTE — PROGRESS NOTES
[facility] Hospitalist Progress Note     PCP: Alec Soto MD    Chief Complaint: follow-up   Follow up for: The primary encounter diagnosis was Nausea and vomiting, unspecified vomiting type. A diagnosis of Renal insufficiency was also pertinent to this visit.    Overnight/Interim Events:      SUBJECTIVE:  No n/v/cp/sob. Feels sxs yesterday 2/2 OJ and acidity. Wants to stay on liquids.     OBJECTIVE:  Temp:  [97.6 °F (36.4 °C)-98.7 °F (37.1 °C)] 98.6 °F (37 °C)  Pulse:  [] 110  Resp:  [18-20] 19  BP: (155-179)/(66-77) 169/75  SpO2:  [93 %-98 %] 96 %    Intake/Output:    Intake/Output Summary (Last 24 hours) at 2/28/2024 1340  Last data filed at 2/28/2024 1034  Gross per 24 hour   Intake 1475 ml   Output 1800 ml   Net -325 ml       Last 3 Weights   02/27/24 0208 190 lb (86.2 kg)   02/26/24 0401 190 lb (86.2 kg)   08/11/23 2300 179 lb 8 oz (81.4 kg)       Exam    General: Alert, no distress, appears stated age.     Head:  Normocephalic, without obvious abnormality, atraumatic.   Eyes:  Sclera anicteric, EOMs intact.    Nose: Nares normal,  Mucosa normal    Throat: Lips normal  Teeth: poor dentition     Neck: Supple, symmetrical, trachea midline   Lungs:   Clear to auscultation bilaterally. Normal effort   Chest wall:  No tenderness or deformity   Heart:  Regular rate and rhythm, S1, S2 normal, no murmur, rub or gallop appreciated   Abdomen:   Soft, NT/ND, Bowel sounds normal. No masses,  No organomegaly.    Extremities: Extremities normal, atraumatic, no cyanosis or LE edema.   Skin: Skin color, texture, turgor normal. No rashes or lesions.    Neurologic: Moving all extremities spontaneously, no focal deficit appreciated      Data Review:       Labs:     Recent Labs   Lab 02/27/24  0249 02/28/24  0659   WBC 23.7* 20.2*   HGB 14.7 12.8*   MCV 81.6 84.0   .0 374.0       Recent Labs   Lab 02/27/24  0249 02/27/24  0353 02/28/24  0659   *  --  139   K  --  3.7 3.5     --  110   CO2 23.0  --  25.0   BUN  14  --  7*   CREATSERUM 1.51*  --  0.82   CA 10.6*  --  9.3   MG  --   --  2.2   *  --  158*       Recent Labs   Lab 02/27/24  0249 02/27/24  0353   ALT 31  --    AST  --  20   ALB 4.6  --        No results for input(s): \"PGLU\" in the last 168 hours.    No results for input(s): \"TROP\" in the last 168 hours.      Meds:      amLODIPine  10 mg Oral Daily    pantoprazole  40 mg Oral Daily    heparin  5,000 Units Subcutaneous Q8H RADHA    ampicillin-sulbactam  3 g Intravenous Q6H      sodium chloride 100 mL/hr at 02/28/24 1039    sodium chloride Stopped (02/27/24 0800)     acetaminophen, ondansetron, metoclopramide, traMADol, alum-mag hydroxide-simethicone, hydrALAzine, morphINE **OR** morphINE **OR** morphINE       Assessment/Plan:     73 year old male with PMH including but not limited to DM, HTN, renal ca who p/t [facility] ED c n/v.      # n/v  -supportive care, IVF, pain/naseua meds  - improving today, likely 2/2 Augmentin although tolerating Unasyn  - CXR and obs series ok  - clears for now, adat; pt prefers to  stay on liquids.      # Elevated BP  # HTN  - 159/77, denies HTN hx (but on norvasc), likely 2/2 n/v/pain, follow   - close o/p f/u, document BPs as o/p and show PCP, may need further titration   - SBP up to 190s, given prn hydralazine     # Acute renal insufficiency, resolved  - 2/2 above, IVF      # Tooth pain, infection  - IV Unasyn for now (tolerated), pt will make dentist appt hopefully for this week  - was on Augmentin, denies prior n/v from it if has taken; told pt to be cautious if takes again  - given script for clinda on dc, follow for any diarrhea   - WBC 23 to 20, repeat as o/p, no overt infectious sxs; check PCT -->0.11    # HR up, some chest/epigastric pain 2/27  - EKG reviewed, no ST changes; trops neg x3  - may be more GI related, pt feels this way, no prior cardiac hx       # GERD  -PPI, wean as o/p if appropriate     # DM  - not on meds at home      # Proph  - sqh    Dispo:  dc    Questions/concerns were discussed with patien by bedside. D/w RN     Total Time spent with patient and coordinating care:  80 minutes [id number] including discussion about yesterday's cp/HTN    Franky Munoz MD  [facility] Hospitalist  [phone]  2/28/2024  1:40 PM

## 2024-02-28 NOTE — PROGRESS NOTES
NURSING DISCHARGE NOTE    Discharged Home via Ambulatory.  Accompanied by RN  Belongings Taken by patient/family.  Pt discharged, aaox4, discharge instructions given to pt, verbalized understanding.

## 2024-02-28 NOTE — PLAN OF CARE
Patient alert and oriented x 4, lungs clear, room air, abdomen soft, bowel sounds present, voids, VSS, IVF infusing, medicated for abdominal pain-see MAR.

## 2024-02-28 NOTE — PLAN OF CARE
Denies pain, n/v at this time, vitals stable.   Problem: PAIN - ADULT  Goal: Verbalizes/displays adequate comfort level or patient's stated pain goal  Description: INTERVENTIONS:  - Encourage pt to monitor pain and request assistance  - Assess pain using appropriate pain scale  - Administer analgesics based on type and severity of pain and evaluate response  - Implement non-pharmacological measures as appropriate and evaluate response  - Consider cultural and social influences on pain and pain management  - Manage/alleviate anxiety  - Utilize distraction and/or relaxation techniques  - Monitor for opioid side effects  - Notify MD/LIP if interventions unsuccessful or patient reports new pain  - Anticipate increased pain with activity and pre-medicate as appropriate  Outcome: Progressing     Problem: RISK FOR INFECTION - ADULT  Goal: Absence of fever/infection during anticipated neutropenic period  Description: INTERVENTIONS  - Monitor WBC  - Administer growth factors as ordered  - Implement neutropenic guidelines  Outcome: Progressing     Problem: SAFETY ADULT - FALL  Goal: Free from fall injury  Description: INTERVENTIONS:  - Assess pt frequently for physical needs  - Identify cognitive and physical deficits and behaviors that affect risk of falls.  - [place] fall precautions as indicated by assessment.  - Educate pt/family on patient safety including physical limitations  - Instruct pt to call for assistance with activity based on assessment  - Modify environment to reduce risk of injury  - Provide assistive devices as appropriate  - Consider OT/PT consult to assist with strengthening/mobility  - Encourage toileting schedule  Outcome: Progressing

## 2024-07-12 ENCOUNTER — APPOINTMENT (OUTPATIENT)
Dept: CT IMAGING | Facility: HOSPITAL | Age: 74
End: 2024-07-12
Attending: EMERGENCY MEDICINE
Payer: MEDICARE

## 2024-07-12 ENCOUNTER — HOSPITAL ENCOUNTER (INPATIENT)
Facility: HOSPITAL | Age: 74
LOS: 3 days | Discharge: HOME | End: 2024-07-15
Attending: EMERGENCY MEDICINE | Admitting: INTERNAL MEDICINE
Payer: MEDICARE

## 2024-07-12 DIAGNOSIS — R11.10 INTRACTABLE VOMITING: ICD-10-CM

## 2024-07-12 DIAGNOSIS — D72.829 LEUKOCYTOSIS, UNSPECIFIED TYPE: ICD-10-CM

## 2024-07-12 DIAGNOSIS — N17.9 AKI (ACUTE KIDNEY INJURY) (HCC): Primary | ICD-10-CM

## 2024-07-12 PROBLEM — R11.0 NAUSEA: Status: ACTIVE | Noted: 2024-07-12

## 2024-07-12 LAB
ALBUMIN SERPL-MCNC: 4.2 G/DL (ref 3.4–5)
ALBUMIN/GLOB SERPL: 0.9 {RATIO} (ref 1–2)
ALP LIVER SERPL-CCNC: 109 U/L
ALT SERPL-CCNC: 23 U/L
ANION GAP SERPL CALC-SCNC: 12 MMOL/L (ref 0–18)
AST SERPL-CCNC: 13 U/L (ref 15–37)
BASOPHILS # BLD AUTO: 0.04 X10(3) UL (ref 0–0.2)
BASOPHILS NFR BLD AUTO: 0.2 %
BILIRUB SERPL-MCNC: 0.6 MG/DL (ref 0.1–2)
BUN BLD-MCNC: 13 MG/DL (ref 9–23)
CALCIUM BLD-MCNC: 10.2 MG/DL (ref 8.5–10.1)
CHLORIDE SERPL-SCNC: 107 MMOL/L (ref 98–112)
CO2 SERPL-SCNC: 20 MMOL/L (ref 21–32)
CREAT BLD-MCNC: 1.42 MG/DL
EGFRCR SERPLBLD CKD-EPI 2021: 53 ML/MIN/1.73M2 (ref 60–?)
EOSINOPHIL # BLD AUTO: 0 X10(3) UL (ref 0–0.7)
EOSINOPHIL NFR BLD AUTO: 0 %
ERYTHROCYTE [DISTWIDTH] IN BLOOD BY AUTOMATED COUNT: 14.4 %
FLUAV + FLUBV RNA SPEC NAA+PROBE: NEGATIVE
FLUAV + FLUBV RNA SPEC NAA+PROBE: NEGATIVE
GLOBULIN PLAS-MCNC: 4.9 G/DL (ref 2.8–4.4)
GLUCOSE BLD-MCNC: 165 MG/DL (ref 70–99)
HCT VFR BLD AUTO: 42.2 %
HGB BLD-MCNC: 13.9 G/DL
IMM GRANULOCYTES # BLD AUTO: 0.09 X10(3) UL (ref 0–1)
IMM GRANULOCYTES NFR BLD: 0.4 %
LIPASE SERPL-CCNC: 29 U/L (ref 13–75)
LYMPHOCYTES # BLD AUTO: 1.9 X10(3) UL (ref 1–4)
LYMPHOCYTES NFR BLD AUTO: 9.1 %
MCH RBC QN AUTO: 27.3 PG (ref 26–34)
MCHC RBC AUTO-ENTMCNC: 32.9 G/DL (ref 31–37)
MCV RBC AUTO: 82.7 FL
MONOCYTES # BLD AUTO: 1.73 X10(3) UL (ref 0.1–1)
MONOCYTES NFR BLD AUTO: 8.3 %
NEUTROPHILS # BLD AUTO: 17.12 X10 (3) UL (ref 1.5–7.7)
NEUTROPHILS # BLD AUTO: 17.12 X10(3) UL (ref 1.5–7.7)
NEUTROPHILS NFR BLD AUTO: 82 %
OSMOLALITY SERPL CALC.SUM OF ELEC: 292 MOSM/KG (ref 275–295)
PLATELET # BLD AUTO: 358 10(3)UL (ref 150–450)
POTASSIUM SERPL-SCNC: 3.7 MMOL/L (ref 3.5–5.1)
PROT SERPL-MCNC: 9.1 G/DL (ref 6.4–8.2)
RBC # BLD AUTO: 5.1 X10(6)UL
RSV RNA SPEC NAA+PROBE: NEGATIVE
SARS-COV-2 RNA RESP QL NAA+PROBE: NOT DETECTED
SODIUM SERPL-SCNC: 139 MMOL/L (ref 136–145)
WBC # BLD AUTO: 20.9 X10(3) UL (ref 4–11)

## 2024-07-12 PROCEDURE — 85025 COMPLETE CBC W/AUTO DIFF WBC: CPT | Performed by: EMERGENCY MEDICINE

## 2024-07-12 PROCEDURE — 0241U SARS-COV-2/FLU A AND B/RSV BY PCR (GENEXPERT): CPT | Performed by: EMERGENCY MEDICINE

## 2024-07-12 PROCEDURE — 0241U SARS-COV-2/FLU A AND B/RSV BY PCR (GENEXPERT): CPT

## 2024-07-12 PROCEDURE — 80053 COMPREHEN METABOLIC PANEL: CPT

## 2024-07-12 PROCEDURE — 99285 EMERGENCY DEPT VISIT HI MDM: CPT

## 2024-07-12 PROCEDURE — 74177 CT ABD & PELVIS W/CONTRAST: CPT | Performed by: EMERGENCY MEDICINE

## 2024-07-12 PROCEDURE — 83690 ASSAY OF LIPASE: CPT | Performed by: STUDENT IN AN ORGANIZED HEALTH CARE EDUCATION/TRAINING PROGRAM

## 2024-07-12 PROCEDURE — 85025 COMPLETE CBC W/AUTO DIFF WBC: CPT

## 2024-07-12 PROCEDURE — 96361 HYDRATE IV INFUSION ADD-ON: CPT

## 2024-07-12 PROCEDURE — 96374 THER/PROPH/DIAG INJ IV PUSH: CPT

## 2024-07-12 PROCEDURE — 80053 COMPREHEN METABOLIC PANEL: CPT | Performed by: EMERGENCY MEDICINE

## 2024-07-12 RX ORDER — METOCLOPRAMIDE HYDROCHLORIDE 5 MG/ML
10 INJECTION INTRAMUSCULAR; INTRAVENOUS ONCE
Status: COMPLETED | OUTPATIENT
Start: 2024-07-12 | End: 2024-07-12

## 2024-07-12 RX ORDER — POLYETHYLENE GLYCOL 3350 17 G/17G
17 POWDER, FOR SOLUTION ORAL DAILY
COMMUNITY

## 2024-07-12 RX ORDER — ONDANSETRON 2 MG/ML
4 INJECTION INTRAMUSCULAR; INTRAVENOUS EVERY 6 HOURS PRN
Status: DISCONTINUED | OUTPATIENT
Start: 2024-07-12 | End: 2024-07-15

## 2024-07-12 RX ORDER — SODIUM CHLORIDE 9 MG/ML
INJECTION, SOLUTION INTRAVENOUS CONTINUOUS
Status: DISCONTINUED | OUTPATIENT
Start: 2024-07-12 | End: 2024-07-15

## 2024-07-12 RX ORDER — SODIUM CHLORIDE 9 MG/ML
INJECTION, SOLUTION INTRAVENOUS CONTINUOUS
Status: ACTIVE | OUTPATIENT
Start: 2024-07-12 | End: 2024-07-12

## 2024-07-12 RX ORDER — ONDANSETRON 2 MG/ML
4 INJECTION INTRAMUSCULAR; INTRAVENOUS EVERY 6 HOURS PRN
Status: DISCONTINUED | OUTPATIENT
Start: 2024-07-12 | End: 2024-07-12

## 2024-07-12 RX ORDER — HEPARIN SODIUM 5000 [USP'U]/ML
5000 INJECTION, SOLUTION INTRAVENOUS; SUBCUTANEOUS EVERY 8 HOURS SCHEDULED
Status: DISCONTINUED | OUTPATIENT
Start: 2024-07-12 | End: 2024-07-15

## 2024-07-12 RX ORDER — ONDANSETRON 4 MG/1
4 TABLET, ORALLY DISINTEGRATING ORAL EVERY 6 HOURS PRN
Status: DISCONTINUED | OUTPATIENT
Start: 2024-07-12 | End: 2024-07-15

## 2024-07-12 RX ORDER — ACETAMINOPHEN 500 MG
500 TABLET ORAL EVERY 4 HOURS PRN
Status: DISCONTINUED | OUTPATIENT
Start: 2024-07-12 | End: 2024-07-15

## 2024-07-12 RX ORDER — ONDANSETRON 2 MG/ML
4 INJECTION INTRAMUSCULAR; INTRAVENOUS EVERY 4 HOURS PRN
Status: DISCONTINUED | OUTPATIENT
Start: 2024-07-12 | End: 2024-07-12

## 2024-07-12 RX ORDER — METOCLOPRAMIDE HYDROCHLORIDE 5 MG/ML
10 INJECTION INTRAMUSCULAR; INTRAVENOUS EVERY 8 HOURS PRN
Status: DISCONTINUED | OUTPATIENT
Start: 2024-07-12 | End: 2024-07-15

## 2024-07-12 NOTE — H&P
[facility] Hospitalist History and Physical     PCP:  Alec Soto MD      Chief Complaint: nausea /vomiting     History of Present Illness: Patient is a 73 year old male with hx of DM, HTN, here n/v and abdominal pain.     Started about 2 days ago.  Pt states he had multiple episodes of emesis, no blood noted. No diarrhea, no dysuria no fevers noted no chest pain no SOB noted.  Pt presented today due to worsening symptoms.  He had a BM in the ED per pt that helped his pain.  No new meds no new foods, no recent travel, no sick contacts    In the ED, WBC around 20, CT abd pending.     Review of Systems  Pertinent items are noted in HPI, Otherwise all 10 systems reviewed and negative.      [facility]    Current Meds  Scheduled Meds:   Continuous Infusions:   PRN Meds:     Allergies   Allergen Reactions    Codeine NAUSEA AND VOMITING        Past Medical History:    Cancer (HCC)    renal    Diabetes (HCC)    Diverticulitis    Diverticulosis of large intestine    Essential hypertension      Past Surgical History:   Procedure Laterality Date    Colonoscopy N/A 6/16/2019    Procedure: COLONOSCOPY;  Surgeon: Won Rashid MD;  Location:  ENDOSCOPY    Kidney surgery      1/3 kidney removed    Other      partial nephrectomy of right kidney due to cancer      Social History     Tobacco Use    Smoking status: Never    Smokeless tobacco: Never   Substance Use Topics    Alcohol use: No      Family History   Problem Relation Age of Onset    Cancer Neg             Intake/Output:  No intake/output data recorded.  Wt Readings from Last 3 Encounters:   07/12/24 185 lb (83.9 kg)   02/27/24 190 lb (86.2 kg)   02/26/24 190 lb (86.2 kg)         Exam:     Temp:  [99.6 °F (37.6 °C)] 99.6 °F (37.6 °C)  Pulse:  [] 112  Resp:  [11-28] 15  BP: (154-155)/(71-79) 154/71  SpO2:  [92 %-99 %] 97 %  General:  Alert, no distress, appears stated age.   Head:  Normocephalic, without obvious abnormality, atraumatic.    Eyes:  Sclera anicteric, No  conjunctival pallor, EOMs intact.    Throat: MMM     Neck: Supple, symmetrical, trachea midline    Lungs:   Clear to auscultation bilaterally. Normal effort    Chest wall:  No tenderness or deformity.   Heart:  Regular rate and rhythm, no peripheral edema    Abdomen:   Soft, NT/ND, +bs    MSK: Atraumatic, no cyanosis or edema.    Skin: No visible rashes or lesions.     Neurologic: Normal strength, no focal deficit appreciated            Labs:     Chem:  Recent Labs   Lab 07/12/24  1119      K 3.7      CO2 20.0*   BUN 13   ALT 23   AST 13*   ALB 4.2       HEM:  Recent Labs   Lab 07/12/24  1120   WBC 20.9*   HGB 13.9   .0   MCV 82.7       Coagulation:  Recent Labs   Lab 07/12/24  1120   HCT 42.2   HGB 13.9   .0          Assessment/Plan:  Patient is a 73 year old male with hx of DM, HTN, here n/v and abdominal pain.          Plan:      Abd pain  Nausea / vomiting  - CT abd pending  - IV fluids, diet as tolerated pending CT   - monitor     Elevated WBC  - check UA  - check CT  - might be related to dehydration given the nausea/vomiting     DM  - diet controleld     HTN  - home meds       Prophylaxis:  DVT: SCD         Janay Mir MD   [facility] Hospitalist

## 2024-07-12 NOTE — ED PROVIDER NOTES
Patient Seen in: [facility] Emergency Department      History     Chief Complaint   Patient presents with    Nausea/Vomiting/Diarrhea     Stated Complaint:     Subjective:   HPI    73-year-old male presents reporting 36 hours of intractable nausea and vomiting.  He denies any diarrhea.  He reports generalized abdominal discomfort that is nonradiating.  No aggravating or alleviating factors.  He says his coworker next to him had COVID and he is wondering if that could be causing the symptoms.  He denies any cough or congestion.  He reports a history of a previous kidney surgery secondary to cancer.  He says he has been unable to eat or drink anything over the past 2 days.    Objective:   Past Medical History:    Cancer (HCC)    renal    Diabetes (HCC)    Diverticulitis    Diverticulosis of large intestine    Essential hypertension              Past Surgical History:   Procedure Laterality Date    Colonoscopy N/A 6/16/2019    Procedure: COLONOSCOPY;  Surgeon: Won Rashid MD;  Location: [facility]    Kidney surgery      1/3 kidney removed    Other      partial nephrectomy of right kidney due to cancer                Social History     Socioeconomic History    Marital status:    Tobacco Use    Smoking status: Never    Smokeless tobacco: Never   Vaping Use    Vaping status: Never Used   Substance and Sexual Activity    Alcohol use: No    Drug use: Yes     Types: Cannabis     Comment: occ     Social Determinants of Health     Food Insecurity: No Food Insecurity (7/12/2024)    Food Insecurity     Food Insecurity: Never true   Transportation Needs: No Transportation Needs (7/12/2024)    Transportation Needs     Lack of Transportation: No   Housing Stability: Low Risk  (7/12/2024)    Housing Stability     Housing Instability: No              Review of Systems    Positive for stated Chief Complaint: Nausea/Vomiting/Diarrhea    Other systems are as noted in HPI.  Constitutional and vital signs reviewed.       All other systems reviewed and negative except as noted above.    Physical Exam     ED Triage Vitals [07/12/24 1116]   /79   Pulse 106   Resp (!) 28   Temp 99.6 °F (37.6 °C)   Temp src Oral   SpO2 99 %   O2 Device None (Room air)       Current Vitals:   Vital Signs  BP: 142/74  Pulse: 105  Resp: 16  Temp: 98.9 °F (37.2 °C)  Temp src: Oral  MAP (mmHg): 91    Oxygen Therapy  SpO2: 98 %  O2 Device: None (Room air)  Pulse Oximetry Type: Continuous  Oximetry Probe Site Changed: No  Pulse Ox Probe Location: Left hand            Physical Exam    General:  Vitals as listed.  Appears moderately ill  HEENT: Sclerae anicteric.  Conjunctivae show no pallor.  Oropharynx clear, mucous membranes moist   Lungs: good air exchange and clear   Heart: Resting tachycardia  Abdomen: Soft and nondistended.  Mild generalized tenderness on palpation.  Normal bowel sounds.  No abdominal masses.  No peritoneal signs   Extremities: no edema, normal peripheral pulses   Neuro: Alert oriented and nonfocal   Skin: no rashes or nodules    ED Course     Labs Reviewed   COMP METABOLIC PANEL (14) - Abnormal; Notable for the following components:       Result Value    Glucose 165 (*)     CO2 20.0 (*)     Creatinine 1.42 (*)     Calcium, Total 10.2 (*)     eGFR-Cr 53 (*)     AST 13 (*)     Total Protein 9.1 (*)     Globulin  4.9 (*)     A/G Ratio 0.9 (*)     All other components within normal limits   URINALYSIS WITH CULTURE REFLEX - Abnormal; Notable for the following components:    Urine Color Colorless (*)     All other components within normal limits   CBC W/ DIFFERENTIAL - Abnormal; Notable for the following components:    WBC 20.9 (*)     Neutrophil Absolute Prelim 17.12 (*)     Neutrophil Absolute 17.12 (*)     Monocyte Absolute 1.73 (*)     All other components within normal limits   LIPASE - Normal   SARS-COV-2/FLU A AND B/RSV BY PCR (GENEXPERT) - Normal    Narrative:     This test is intended for the qualitative detection and  differentiation of SARS-CoV-2, influenza A, influenza B, and respiratory syncytial virus (RSV) viral RNA in nasopharyngeal or nares swabs from individuals suspected of respiratory viral infection consistent with COVID-19 by their healthcare provider. Signs and symptoms of respiratory viral infection due to SARS-CoV-2, influenza, and RSV can be similar.    Test performed using the Xpert Xpress SARS-CoV-2/FLU/RSV (real time RT-PCR)  assay on the GeneXpert instrument, [employer], [place] [address].   This test is being used under the Food and Drug Administration's Emergency Use Authorization.    The authorized Fact Sheet for Healthcare Providers for this assay is available upon request from the laboratory.   CBC WITH DIFFERENTIAL WITH PLATELET    Narrative:     The following orders were created for panel order CBC With Differential With Platelet.  Procedure                               Abnormality         Status                     ---------                               -----------         ------                     CBC W/ DIFFERENTIAL[[id number]]          Abnormal            Final result                 Please view results for these tests on the individual orders.   CBC WITH DIFFERENTIAL WITH PLATELET   COMP METABOLIC PANEL (14)   RAINBOW DRAW LAVENDER   RAINBOW DRAW LIGHT GREEN             CT ABDOMEN+PELVIS(CONTRAST ONLY)(CPT=74177)    Result Date: 7/12/2024  PROCEDURE:  CT ABDOMEN+PELVIS (CONTRAST ONLY) (CPT=74177)  COMPARISON:  [facility] , CT, CT ABDOMEN PELVIS IV CONTRAST, NO ORAL (ER), 8/11/2023, 7:39 PM.  INDICATIONS:  n/v abd pain. wbc 21  TECHNIQUE:  CT scanning was performed from the dome of the diaphragm to the pubic symphysis with non-ionic intravenous contrast material. Post contrast coronal MPR imaging was performed.  Dose reduction techniques were used. Dose information is transmitted to the ACR (American College of Radiology) NRDR (National Radiology Data Registry) which includes the Dose Index Registry.   PATIENT STATED HISTORY:(As transcribed by Technologist)  Patient has nausea vomiting, abdominal pain with elevated WBC   CONTRAST USED:  100cc of Isovue 370  FINDINGS:  LIVER:  No enlargement, atrophy, abnormal density, or significant focal lesion.  BILIARY:  Calcification in the gallbladder wall is stable.  This may represent a calcified gallstone. PANCREAS:  No lesion, fluid collection, ductal dilatation, or atrophy.  SPLEEN:  No enlargement or focal lesion.  KIDNEYS:  Multiple cysts in the kidneys.  Largest in the upper pole the right kidney measures 2.6 x 2.6 cm.  Largest in the upper pole left kidney measures 2.2 x 1.7 cm. ADRENALS:  No mass or enlargement.  AORTA/VASCULAR:  No aneurysm or dissection.  RETROPERITONEUM:  No mass or adenopathy.  BOWEL/MESENTERY:  There is marked diverticulosis of the colon without evidence of acute diverticulitis.  The appendix is normal.  Infiltration of the mesenteric fat is again noted.  This is unchanged since 2023. ABDOMINAL WALL:  No mass or hernia.  URINARY BLADDER:  No visible focal wall thickening, lesion, or calculus.  PELVIC NODES:  No adenopathy.  PELVIC ORGANS:  Enlarged prostate gland measures 6.1 cm. BONES:  No bony lesion or fracture.  LUNG BASES:  No visible pulmonary or pleural disease.  OTHER:  Negative.             CONCLUSION:   1. No evidence of an acute inflammatory process.  2. Marked diverticulosis of the colon without evidence of acute diverticulitis.  3. Diffuse infiltration of mesenteric fat is unchanged since prior exam.  4. Enlarged prostate gland is again noted.   LOCATION:  [place]   Dictated by (CST): Angelito He MD on 7/12/2024 at 4:11 PM     Finalized by (CST): Angelito He MD on 7/12/2024 at 4:14 PM               MDM      73-year-old male presents reporting he has been unable to tolerate anything by mouth over the last 2 days.  He arrives tachycardic and nauseous.    Differential includes but is not limited to gastroenteritis, bowel  obstruction, metabolic disturbance, dehydration, a life threat.    CBC, CMP, viral nasal swab, CT abdomen and pelvis ordered for further evaluation.  0.9 NS 1 L bolus x 2, Reglan 10 mg IV    Laboratory evaluation shows EDILMA with a creatinine of 1.4.  He also has a significant leukocytosis at 20.9.  He remains tachycardic after IV hydration here.  Low-grade temp at 99.6 °F.  CT is pending.  The patient is moderately ill-appearing with tachycardia and unable to tolerate p.o. despite antiemetics.  Will admit for further management.  He is agreeable.  Discussed with admitting physician.    My independent interpretation of CT of the abdomen pelvis is that there is no evidence of free air.    Radiology reports \"no evidence of an acute inflammatory process\".          Admission disposition: 7/13/2024  6:09 AM                                        Medical Decision Making      Disposition and Plan     Clinical Impression:  1. EDILMA (acute kidney injury) (HCC)    2. Intractable vomiting    3. Leukocytosis, unspecified type         Disposition:  Admit  7/13/2024  6:09 am    Follow-up:  No follow-up provider specified.        Medications Prescribed:  Current Discharge Medication List                            Hospital Problems       Present on Admission  Date Reviewed: 2/27/2024            ICD-10-CM Noted POA    * (Principal) EDILMA (acute kidney injury) (HCC) N17.9 7/12/2024 Unknown    Intractable vomiting R11.10 7/12/2024 Unknown    Leukocytosis, unspecified type D72.829 1/3/2018 Unknown    Nausea R11.0 7/12/2024 Unknown

## 2024-07-12 NOTE — ED QUICK NOTES
Patient arrives via EMS from home. Patient states he was at work last week and his coworker had Covid. States the last 24 hours he's been having nausea, vomiting. Not able to tolerate PO. Low grade fever. Patient denies eating anything abnormal, such as restaurants, etc. Denies any other sick contacts. Denies abdominal pain or diarrhea.

## 2024-07-12 NOTE — PLAN OF CARE
Problem: Patient/Family Goals  Goal: Patient/Family Long Term Goal  Description: Patient's Long Term Goal: \"gain my weight back about 15 lbs back\"     Interventions:  - dietician consult  Resolution of nausea   - See additional Care Plan goals for specific interventions  Outcome: Progressing  Goal: Patient/Family Short Term Goal  Description: Patient's Short Term Goal: \"get back to working out\"    Interventions:   - resolution of current symptoms  Tolerating diet    - See additional Care Plan goals for specific interventions  Outcome: Progressing     Problem: GASTROINTESTINAL - ADULT  Goal: Minimal or absence of nausea and vomiting  Description: INTERVENTIONS:  - Maintain adequate hydration with IV or PO as ordered and tolerated  - Nasogastric tube to low intermittent suction as ordered  - Evaluate effectiveness of ordered antiemetic medications  - Provide nonpharmacologic comfort measures as appropriate  - Advance diet as tolerated, if ordered  - Obtain nutritional consult as needed  - Evaluate fluid balance  Outcome: Progressing     Problem: METABOLIC/FLUID AND ELECTROLYTES - ADULT  Goal: Hemodynamic stability and optimal renal function maintained  Description: INTERVENTIONS:  - Monitor labs and assess for signs and symptoms of volume excess or deficit  - Monitor intake, output and patient weight  - Monitor urine specific gravity, serum osmolarity and serum sodium as indicated or ordered  - Monitor response to interventions for patient's volume status, including labs, urine output, blood pressure (other measures as available)  - Encourage oral intake as appropriate  - Instruct patient on fluid and nutrition restrictions as appropriate  Outcome: Progressing     Patient alert and oriented, denies pain and nausea at current time. Patient eating dinner, ambulates independently. Patient updated progressing and in agreement with POC.

## 2024-07-12 NOTE — ED INITIAL ASSESSMENT (HPI)
Nausea, vomiting started 24 hours ago. Low grade fever. Exposed to Covid. No diarrhea. No abdominal pain. Not able to tolerate PO.

## 2024-07-12 NOTE — PLAN OF CARE
NURSING ADMISSION NOTE      Patient admitted via Cart  Oriented to room.  Safety precautions initiated.  Bed in low position.  Call light in reach.    Patient alert and oriented. Denies pain denies nausea. Diet order received. Patient ambulatory.

## 2024-07-12 NOTE — ED QUICK NOTES
Orders for admission, patient is aware of plan and ready to go upstairs. Any questions, please call ED RN Tasia at extension [phone].     Patient Covid vaccination status: Fully vaccinated     COVID Test Ordered in ED: SARS-CoV-2/Flu A and B/RSV by PCR (GeneXpert)    COVID Suspicion at Admission: N/A    Running Infusions:      Mental Status/LOC at time of transport: Alert, oriented X4.    Other pertinent information:   CIWA score: N/A   NIH score:  N/A

## 2024-07-13 ENCOUNTER — ANESTHESIA EVENT (OUTPATIENT)
Dept: ENDOSCOPY | Facility: HOSPITAL | Age: 74
End: 2024-07-13
Payer: MEDICARE

## 2024-07-13 LAB
ALBUMIN SERPL-MCNC: 3.9 G/DL (ref 3.4–5)
ALBUMIN/GLOB SERPL: 0.9 {RATIO} (ref 1–2)
ALP LIVER SERPL-CCNC: 100 U/L
ALT SERPL-CCNC: 23 U/L
ANION GAP SERPL CALC-SCNC: 8 MMOL/L (ref 0–18)
AST SERPL-CCNC: 19 U/L (ref 15–37)
BASOPHILS # BLD AUTO: 0.03 X10(3) UL (ref 0–0.2)
BASOPHILS NFR BLD AUTO: 0.2 %
BILIRUB SERPL-MCNC: 0.6 MG/DL (ref 0.1–2)
BILIRUB UR QL STRIP.AUTO: NEGATIVE
BUN BLD-MCNC: 6 MG/DL (ref 9–23)
CALCIUM BLD-MCNC: 9.2 MG/DL (ref 8.5–10.1)
CHLORIDE SERPL-SCNC: 106 MMOL/L (ref 98–112)
CLARITY UR REFRACT.AUTO: CLEAR
CO2 SERPL-SCNC: 24 MMOL/L (ref 21–32)
COLOR UR AUTO: COLORLESS
CREAT BLD-MCNC: 1.03 MG/DL
EGFRCR SERPLBLD CKD-EPI 2021: 77 ML/MIN/1.73M2 (ref 60–?)
EOSINOPHIL # BLD AUTO: 0.01 X10(3) UL (ref 0–0.7)
EOSINOPHIL NFR BLD AUTO: 0.1 %
ERYTHROCYTE [DISTWIDTH] IN BLOOD BY AUTOMATED COUNT: 14.6 %
GLOBULIN PLAS-MCNC: 4.3 G/DL (ref 2.8–4.4)
GLUCOSE BLD-MCNC: 155 MG/DL (ref 70–99)
GLUCOSE UR STRIP.AUTO-MCNC: NORMAL MG/DL
HCT VFR BLD AUTO: 40.9 %
HGB BLD-MCNC: 13.3 G/DL
IMM GRANULOCYTES # BLD AUTO: 0.05 X10(3) UL (ref 0–1)
IMM GRANULOCYTES NFR BLD: 0.3 %
KETONES UR STRIP.AUTO-MCNC: NEGATIVE MG/DL
LEUKOCYTE ESTERASE UR QL STRIP.AUTO: NEGATIVE
LYMPHOCYTES # BLD AUTO: 2.43 X10(3) UL (ref 1–4)
LYMPHOCYTES NFR BLD AUTO: 15.7 %
MCH RBC QN AUTO: 27.9 PG (ref 26–34)
MCHC RBC AUTO-ENTMCNC: 32.5 G/DL (ref 31–37)
MCV RBC AUTO: 85.7 FL
MONOCYTES # BLD AUTO: 1.26 X10(3) UL (ref 0.1–1)
MONOCYTES NFR BLD AUTO: 8.1 %
NEUTROPHILS # BLD AUTO: 11.69 X10 (3) UL (ref 1.5–7.7)
NEUTROPHILS # BLD AUTO: 11.69 X10(3) UL (ref 1.5–7.7)
NEUTROPHILS NFR BLD AUTO: 75.6 %
NITRITE UR QL STRIP.AUTO: NEGATIVE
OSMOLALITY SERPL CALC.SUM OF ELEC: 287 MOSM/KG (ref 275–295)
PH UR STRIP.AUTO: 6.5 [PH] (ref 5–8)
PLATELET # BLD AUTO: 337 10(3)UL (ref 150–450)
POTASSIUM SERPL-SCNC: 3.8 MMOL/L (ref 3.5–5.1)
PROT SERPL-MCNC: 8.2 G/DL (ref 6.4–8.2)
PROT UR STRIP.AUTO-MCNC: NEGATIVE MG/DL
PSA SERPL-MCNC: 8.36 NG/ML (ref ?–4)
RBC # BLD AUTO: 4.77 X10(6)UL
RBC UR QL AUTO: NEGATIVE
SODIUM SERPL-SCNC: 138 MMOL/L (ref 136–145)
SP GR UR STRIP.AUTO: 1.01 (ref 1–1.03)
UROBILINOGEN UR STRIP.AUTO-MCNC: NORMAL MG/DL
WBC # BLD AUTO: 15.5 X10(3) UL (ref 4–11)

## 2024-07-13 PROCEDURE — 84153 ASSAY OF PSA TOTAL: CPT | Performed by: INTERNAL MEDICINE

## 2024-07-13 PROCEDURE — 85025 COMPLETE CBC W/AUTO DIFF WBC: CPT | Performed by: INTERNAL MEDICINE

## 2024-07-13 PROCEDURE — 81003 URINALYSIS AUTO W/O SCOPE: CPT | Performed by: INTERNAL MEDICINE

## 2024-07-13 PROCEDURE — 80053 COMPREHEN METABOLIC PANEL: CPT | Performed by: INTERNAL MEDICINE

## 2024-07-13 RX ORDER — CALCIUM CARBONATE 500 MG/1
500 TABLET, CHEWABLE ORAL 4 TIMES DAILY PRN
Status: DISCONTINUED | OUTPATIENT
Start: 2024-07-13 | End: 2024-07-15

## 2024-07-13 RX ORDER — PANTOPRAZOLE SODIUM 20 MG/1
20 TABLET, DELAYED RELEASE ORAL ONCE
Status: COMPLETED | OUTPATIENT
Start: 2024-07-13 | End: 2024-07-13

## 2024-07-13 RX ORDER — KETOROLAC TROMETHAMINE 15 MG/ML
15 INJECTION, SOLUTION INTRAMUSCULAR; INTRAVENOUS ONCE AS NEEDED
Status: COMPLETED | OUTPATIENT
Start: 2024-07-13 | End: 2024-07-13

## 2024-07-13 NOTE — PLAN OF CARE
Patient with poor appetite, denies nausea, denies abdominal pain. Patient's vital signs stable. Patient for EGD tomorrow, instructed NPO after midnight.

## 2024-07-13 NOTE — PLAN OF CARE
Problem: Patient/Family Goals  Goal: Patient/Family Long Term Goal  Description: Patient's Long Term Goal: \"gain my weight back about 15 lbs back\"     Interventions:  - dietician consult  Resolution of nausea   - See additional Care Plan goals for specific interventions  Outcome: Progressing  Goal: Patient/Family Short Term Goal  Description: Patient's Short Term Goal: \"get back to working out\"    Interventions:   - resolution of current symptoms  Tolerating diet    - See additional Care Plan goals for specific interventions  Outcome: Progressing     Problem: GASTROINTESTINAL - ADULT  Goal: Minimal or absence of nausea and vomiting  Description: INTERVENTIONS:  - Maintain adequate hydration with IV or PO as ordered and tolerated  - Nasogastric tube to low intermittent suction as ordered  - Evaluate effectiveness of ordered antiemetic medications  - Provide nonpharmacologic comfort measures as appropriate  - Advance diet as tolerated, if ordered  - Obtain nutritional consult as needed  - Evaluate fluid balance  Outcome: Progressing     Problem: METABOLIC/FLUID AND ELECTROLYTES - ADULT  Goal: Hemodynamic stability and optimal renal function maintained  Description: INTERVENTIONS:  - Monitor labs and assess for signs and symptoms of volume excess or deficit  - Monitor intake, output and patient weight  - Monitor urine specific gravity, serum osmolarity and serum sodium as indicated or ordered  - Monitor response to interventions for patient's volume status, including labs, urine output, blood pressure (other measures as available)  - Encourage oral intake as appropriate  - Instruct patient on fluid and nutrition restrictions as appropriate  Outcome: Progressing

## 2024-07-13 NOTE — PROGRESS NOTES
[facility] Hospitalist Progress Note       SUBJECTIVE:  Had a bad night states had abd pain couldn't eat    No chest pain no SOB no diarrhea    CT okay, prostate was enalarged    OBJECTIVE:  Scheduled Meds:    pantoprazole  40 mg Intravenous Q12H    heparin  5,000 Units Subcutaneous Q8H RADHA     Continuous Infusions:    sodium chloride 100 mL/hr at 07/13/24 0300     PRN Meds:   calcium carbonate    acetaminophen    metoclopramide    ondansetron **OR** ondansetron    Vitals  Vitals:    07/13/24 0829   BP: 159/78   Pulse: 97   Resp: 16   Temp: 98.7 °F (37.1 °C)         Exam   Gen-    no acute distress, alert and oriented x 3   RESP-   Lungs CTA, normal respiratory effort  CV-      Heart RRR, no mgr  Abd-    soft, nondistended, nontender, bowel sounds present  Skin-    no rash  Neuro-  no focal neurologic deficits  Ext-      No edema in extremities   Psych- alert and oriented x 3     Labs:     Chem:  Recent Labs   Lab 07/12/24  1119      K 3.7      CO2 20.0*   BUN 13   ALT 23   AST 13*   ALB 4.2       HEM:  Recent Labs   Lab 07/12/24  1120   WBC 20.9*   HGB 13.9   .0   MCV 82.7       Coagulation:  Recent Labs   Lab 07/12/24  1120   HCT 42.2   HGB 13.9   .0       Cardiac:  No results for input(s): \"CKMB\" in the last 168 hours.    Invalid input(s): \"CKTOTAL\", \"CKMBINDEX\", \"TROPONINI\"    Urinalysis:   Recent Labs   Lab 07/13/24  0318   UROBILINOGEN Normal   NITRITE Negative          AP:     73 year old male with hx of DM, HTN, here n/v and abdominal pain.            Plan:       Abd pain  Nausea / vomiting  - CT abd no acute pathology noted  - IV fluids    - monitor - states had a bad night had a hard time tolerating diet, + abd pain  - IV ppi, GI consulted      Elevated WBC - labs pending this AM  - check UA - neg  - check CT - no acute pathology   - might be related to dehydration given the nausea/vomiting     Enlarged prostate  - check PSA  - d/w pt      DM  - diet controleld      HTN  - home meds         Prophylaxis:  DVT: STAR Mir MD   [facility] Hospitalist

## 2024-07-13 NOTE — ANESTHESIA PREPROCEDURE EVALUATION
ED Provider Note  [facility]      History   No chief complaint on file.    The history is provided by the patient and medical records.     Layne Guadarrama is a 79 year old female with a medical history significant for nonischemic cardiomyopathy s/p HM III LVAD (11/22/2017), atrial fibrillation (on warfarin), type 2 diabetes mellitus, CKD stage III, hypertension and hyperlipidemia who presents to the Emergency Department via EMS for evaluation of shortness of breath.  Patient reports that she has been feeling short of breath with exertion or when laying flat since 1/28/2021.  Patient reports that she felt fluid overloaded as she has had some increased bilateral leg swelling.  She denies any leg pain.  The patient had to sleep in her recliner last night.  Patient states that she lives alone and is concerned about her shortness of breath.  The patient contacted her cardiology team and was told to take an additional torsemide 10 mg.  Patient is normally on torsemide 20 mg daily; yesterday and today she took torsemide twice daily 20 mg / 10 mg as well as an extra 40 mEq of KCl.  Plan was for patient to have laboratory studies done on 2/1/2021.  Patient reports that she did have some minimal increase in urine output with the increase of her doses, but she continued to feel short of breath today and presents to the Emergency Department now for further evaluation and management.  Patient denies any chest pain, cough, fevers, nausea or vomiting.  She does note that she has some chronic, mild, intermittent epigastric abdominal pain due to an ulcer, but she typically takes an antacid and this pain will resolve.  She denies any recent new abdominal pain.  Patient reports that she has chronic paresthesias in her feet, but this has not changed recently.  Patient denies any problems at her driveline entry site.  She denies any history of pulmonary disease.  Of note, patient underwent a colonoscopy on  PRE-OP EVALUATION    Patient Name: Kandis Xavier    Admit Diagnosis: Intractable vomiting [R11.10]  EDILMA (acute kidney injury) (HCC) [N17.9]  Leukocytosis, unspecified type [D72.829]    Pre-op Diagnosis: Vomiting [R11.10]    ESOPHAGOGASTRODUODENOSCOPY (EGD)    Anesthesia Procedure: ESOPHAGOGASTRODUODENOSCOPY (EGD)    Surgeons and Role:     * Christopher Orta MD - Primary    Pre-op vitals reviewed.  Temp: 98.7 °F (37.1 °C)  Pulse: 103  Resp: 16  BP: 138/72  SpO2: 98 %  Body mass index is 25.53 kg/m².    Current medications reviewed.  Hospital Medications:   [COMPLETED] ketorolac (Toradol) 15 MG/ML injection 15 mg  15 mg Intravenous Once PRN    [COMPLETED] pantoprazole (Protonix) DR tab 20 mg  20 mg Oral Once    calcium carbonate (Tums) chewable tab 500 mg  500 mg Oral QID PRN    pantoprazole (Protonix) 40 mg in sodium chloride 0.9% PF 10 mL IV push  40 mg Intravenous Q12H    [COMPLETED] sodium chloride 0.9 % IV bolus 1,000 mL  1,000 mL Intravenous Once    [COMPLETED] sodium chloride 0.9 % IV bolus 1,000 mL  1,000 mL Intravenous Once    [COMPLETED] metoclopramide (Reglan) 5 mg/mL injection 10 mg  10 mg Intravenous Once    sodium chloride 0.9% infusion   Intravenous Continuous    heparin (Porcine) 5000 UNIT/ML injection 5,000 Units  5,000 Units Subcutaneous Q8H RADHA    acetaminophen (Tylenol Extra Strength) tab 500 mg  500 mg Oral Q4H PRN    metoclopramide (Reglan) 5 mg/mL injection 10 mg  10 mg Intravenous Q8H PRN    [] sodium chloride 0.9% infusion   Intravenous Continuous    [COMPLETED] iopamidol 76% (ISOVUE-370) injection for power injector  100 mL Intravenous ONCE PRN    ondansetron (Zofran-ODT) disintegrating tab 4 mg  4 mg Oral Q6H PRN    Or    ondansetron (Zofran) 4 MG/2ML injection 4 mg  4 mg Intravenous Q6H PRN       Outpatient Medications:     Medications Prior to Admission   Medication Sig Dispense Refill Last Dose    polyethylene glycol, PEG 3350, 17 g Oral Powd Pack Take 17 g by mouth daily.    1/26/2021 (4 days ago), but patient reports that she continued to take all of her medications as prescribed leading up to and since the procedure.    Past Medical History  Past Medical History:   Diagnosis Date     Allergic rhinitis, cause unspecified      Antiplatelet or antithrombotic long-term use      Arrhythmia      Atrial fibrillation (H)      Chronic kidney disease, stage 3      Congestive heart failure, unspecified      Diffuse cystic mastopathy      Dyslipidemia      Gout 12/30/2009     HFrEF (heart failure with reduced ejection fraction) (H)      Hypertension goal BP (blood pressure) < 140/90 9/30/2011     Hyposmolality and/or hyponatremia      Idiopathic cardiomyopathy (H)      Impacted cerumen 3/19/2012     Obesity, unspecified      Osteoarthritis     knees     Peptic ulcer, unspecified site, unspecified as acute or chronic, without mention of hemorrhage, perforation, or obstruction      Pneumonia 3/25/2020     Pulmonary embolism with infarction (HCC) [I26.99] 3/18/2016     Tubular adenoma of colon      Type 2 diabetes, HbA1C goal < 8% (H) 10/31/2010     Type II or unspecified type diabetes mellitus without mention of complication, not stated as uncontrolled      Past Surgical History:   Procedure Laterality Date     ARTHROPLASTY KNEE Right 3/10/2015    knee replacement     BIOPSY  Jan2016    cyst under chin on right side     CATARACT IOL, RT/LT Bilateral 2016     COLONOSCOPY N/A 1/26/2021    Procedure: COLONOSCOPY;  Surgeon: Kris Katz MD;  Location:  GI     COLONOSCOPY N/A 1/26/2021    Procedure: Colonoscopy, With Polypectomy And Biopsy;  Surgeon: Kris Katz MD;  Location:  GI     CV RIGHT HEART CATH MEASUREMENTS RECORDED N/A 6/3/2019    Procedure: CV RIGHT HEART CATH;  Surgeon: Juan Diego Kerns MD;  Location:  HEART CARDIAC CATH LAB     HYSTERECTOMY TOTAL ABDOMINAL       IMPLANT IMPLANTABLE CARDIOVERTER DEFIBRILLATOR Left 02/02/2017    [employer] ICD      INSERT  7/10/2024    omeprazole 20 MG Oral Capsule Delayed Release Take 1 capsule (20 mg total) by mouth daily.   7/10/2024    AMLODIPINE 10 MG Oral Tab TAKE 1 TABLET BY MOUTH EVERY DAY 90 tablet 0 7/10/2024    Multiple Vitamin (TAB-A-CARINA) Oral Tab Take 1 tablet by mouth daily.   7/10/2024       Allergies: Codeine      Anesthesia Evaluation        Anesthetic Complications  (-) history of anesthetic complications         GI/Hepatic/Renal  Comment: Renal cell ca sp partial resection, right    Enlarged prostate per CT this admit           (+) chronic renal disease         (+) diverticulitis           Cardiovascular      ECG reviewed.      MET: >4      (+) hypertension and well controlled                                    Endo/Other  Comment: Diet controlled dm    (+) diabetes and well controlled,  not using insulin                         Pulmonary    Negative pulmonary ROS.                       Neuro/Psych    Negative neuro/psych ROS.                          Presented with abd  pain and N/V.  CT neg, leukocytosis on work up.          Past Surgical History:   Procedure Laterality Date    Colonoscopy N/A 6/16/2019    Procedure: COLONOSCOPY;  Surgeon: Won Rashid MD;  Location:  ENDOSCOPY    Kidney surgery      1/3 kidney removed    Other      partial nephrectomy of right kidney due to cancer     Social History     Socioeconomic History    Marital status:    Tobacco Use    Smoking status: Never    Smokeless tobacco: Never   Vaping Use    Vaping status: Never Used   Substance and Sexual Activity    Alcohol use: No    Drug use: Yes     Types: Cannabis     Comment: occ     History   Drug Use    Types: Cannabis     Comment: occ     Available pre-op labs reviewed.  Lab Results   Component Value Date    WBC 15.5 (H) 07/13/2024    RBC 4.77 07/13/2024    HGB 13.3 07/13/2024    HCT 40.9 07/13/2024    MCV 85.7 07/13/2024    MCH 27.9 07/13/2024    MCHC 32.5 07/13/2024    RDW 14.6 07/13/2024    .0 07/13/2024      Lab Results   Component Value Date     07/13/2024    K 3.8 07/13/2024     07/13/2024    CO2 24.0 07/13/2024    BUN 6 (L) 07/13/2024    CREATSERUM 1.03 07/13/2024     (H) 07/13/2024    CA 9.2 07/13/2024             ASA: 2   Plan: MAC           Comment: Chart review!                  Present on Admission:  **None**         VENTRICULAR ASSIST DEVICE LEFT (HEARTMATE II) Left 2017    HM III     PAROTIDECTOMY Right 2016    Procedure: PAROTIDECTOMY;  Surgeon: Rell Murphy MD;  Location: [facility] OR     ZZC NONSPECIFIC PROCEDURE  1994    TVH-prolapse     ZZC NONSPECIFIC PROCEDURE      nvd x 3          acetaminophen (TYLENOL) 325 MG tablet       allopurinol (ZYLOPRIM) 100 MG tablet       amLODIPine (NORVASC) 10 MG tablet       aspirin (ASA) 81 MG EC tablet       B-12 TR 1000 MCG CR tablet       BD THONY U/F 32G X 4 MM insulin pen needle       bisacodyl (DULCOLAX) 5 MG EC tablet       Cholecalciferol (VITAMIN D3) 50 MCG ( UT) CAPS       digoxin (LANOXIN) 125 MCG tablet       dulaglutide (TRULICITY) 1.5 MG/0.5ML pen       famotidine (PEPCID) 20 MG tablet       glipiZIDE (GLUCOTROL) 5 MG tablet       hydrALAZINE (APRESOLINE) 25 MG tablet       insulin glargine (BASAGLAR KWIKPEN) 100 UNIT/ML pen       lactobacillus rhamnosus, GG, (CULTURELL) capsule       multivitamin, therapeutic with minerals (THERA-VIT-M) TABS tablet       pantoprazole (PROTONIX) 40 MG EC tablet       polyethylene glycol (GOLYTELY) 236 g suspension       polyethylene glycol 3350 POWD       potassium chloride ER (KLOR-CON M) 10 MEQ CR tablet       pravastatin (PRAVACHOL) 20 MG tablet       torsemide (DEMADEX) 20 MG tablet       warfarin ANTICOAGULANT (COUMADIN) 1 MG tablet      Allergies   Allergen Reactions     Blood Transfusion Related (Informational Only) Other (See Comments)     Patient has a history of a clinically significant antibody against RBC antigens.  A delay in compatible RBCs may occur.     Cats      Eyes burn      Isordil [Isosorbide]      headaches     Seasonal Allergies      Uncaria Tomentosa (Cats Claw)      Family History  Family History   Problem Relation Age of Onset     C.A.D. Father          at age 72, CABG at 68     Cancer - colorectal Mother          at age 69     Cardiovascular Mother         CHF     Family History Negative  "Sister      Family History Negative Daughter      Family History Negative Son      Family History Negative Son      Respiratory Brother         Sleep Apnea     Social History   Social History     Tobacco Use     Smoking status: Never Smoker     Smokeless tobacco: Never Used   Substance Use Topics     Alcohol use: Yes     Frequency: Never     Binge frequency: Never     Comment: holidays     Drug use: No      Past medical history, past surgical history, medications, allergies, family history, and social history were reviewed with the patient. No additional pertinent items.       Review of Systems   Constitutional: Negative for fever.   HENT: Negative for congestion, rhinorrhea and sore throat.    Respiratory: Positive for shortness of breath (with exertion and laying flat). Negative for cough.    Cardiovascular: Positive for leg swelling (bilateral, worsening). Negative for chest pain.   Gastrointestinal: Negative for abdominal pain, nausea and vomiting.   Genitourinary: Negative for dysuria.   Musculoskeletal: Negative for myalgias.        Negative for leg pain   Skin: Negative for color change and rash.   Neurological: Negative for headaches.        Positive for paresthesias in feet; chronic, unchanged   All other systems reviewed and are negative.      Physical Exam   BP: 106/69  Pulse: 64  Temp: 99.1  F (37.3  C)  Resp: 20  Height: 167.6 cm (5' 6\")  Weight: 76.7 kg (169 lb)  SpO2: 98 %  Physical Exam  GEN:  Well developed, no acute distress  HEENT:  EOMI, Mucous membranes are moist.   Cardio:  Mechanical hum from LVAD, radial pulses equal bilaterally  PULM:  Lungs clear, good air movement, no wheezes, rales  Abd:  Soft, normal bowel sounds, no focal tenderness  Musculoskeletal:  normal range of motion of the extremities, both ankles and feet have mild swelling, no calf swelling or calf tenderness  Neuro:  Alert and oriented X3, Follows commands, moving all extremities spontaneously   Skin:  Warm, dry    ED Course "      Procedures     8:03 PM  The patient was seen and examined by Annabella Stover MD in Room ED15.              Chest x-ray was reviewed by me and results are shown below.  Labs were reviewed by me and are shown below as well.  Her BNP level is elevated today and compared to the last recorded BNP in the chart from 2019, it has significantly increased.  Patient was given IV Lasix in the emergency department for fluid overload and pulmonary edema, acute CHF exacerbation.     Results for orders placed or performed during the hospital encounter of 01/30/21   XR Chest 2 Views     Status: None    Narrative    EXAMINATION:  XR CHEST 2 VW 1/30/2021 9:10 PM.    COMPARISON: 6/5/2020.    HISTORY:  shortness of breath    FINDINGS: PA and lateral views of the chest. Left chest wall cardiac  device with intact lead projecting over the right ventricle. Again  noted LVAD with intact drive line. Midline trachea. Cardiomediastinal  silhouette is stable. Pulmonary vasculature is distinct. Mild  interstitial prominence. No pleural effusion or pneumothorax. No focal  airspace opacity.      Impression    IMPRESSION: Interstitial prominence suggestive of mild pulmonary  edema. Otherwise no focal airspace opacity.    I have personally reviewed the examination and initial interpretation  and I agree with the findings.    STEFANIA SAMPSON MD   CBC with platelets differential     Status: Abnormal   Result Value Ref Range    WBC 14.2 (H) 4.0 - 11.0 10e9/L    RBC Count 4.19 3.8 - 5.2 10e12/L    Hemoglobin 12.5 11.7 - 15.7 g/dL    Hematocrit 38.9 35.0 - 47.0 %    MCV 93 78 - 100 fl    MCH 29.8 26.5 - 33.0 pg    MCHC 32.1 31.5 - 36.5 g/dL    RDW 14.3 10.0 - 15.0 %    Platelet Count 248 150 - 450 10e9/L    Diff Method Automated Method     % Neutrophils 88.3 %    % Lymphocytes 4.0 %    % Monocytes 6.3 %    % Eosinophils 0.4 %    % Basophils 0.4 %    % Immature Granulocytes 0.6 %    Nucleated RBCs 0 0 /100    Absolute Neutrophil 12.5 (H) 1.6 -  8.3 10e9/L    Absolute Lymphocytes 0.6 (L) 0.8 - 5.3 10e9/L    Absolute Monocytes 0.9 0.0 - 1.3 10e9/L    Absolute Eosinophils 0.1 0.0 - 0.7 10e9/L    Absolute Basophils 0.1 0.0 - 0.2 10e9/L    Abs Immature Granulocytes 0.1 0 - 0.4 10e9/L    Absolute Nucleated RBC 0.0    Comprehensive metabolic panel     Status: Abnormal   Result Value Ref Range    Sodium 139 133 - 144 mmol/L    Potassium 4.2 3.4 - 5.3 mmol/L    Chloride 105 94 - 109 mmol/L    Carbon Dioxide 30 20 - 32 mmol/L    Anion Gap 5 3 - 14 mmol/L    Glucose 212 (H) 70 - 99 mg/dL    Urea Nitrogen 15 7 - 30 mg/dL    Creatinine 1.65 (H) 0.52 - 1.04 mg/dL    GFR Estimate 29 (L) >60 mL/min/[1.73_m2]    GFR Estimate If Black 34 (L) >60 mL/min/[1.73_m2]    Calcium 9.0 8.5 - 10.1 mg/dL    Bilirubin Total 1.1 0.2 - 1.3 mg/dL    Albumin 3.2 (L) 3.4 - 5.0 g/dL    Protein Total 7.7 6.8 - 8.8 g/dL    Alkaline Phosphatase 55 40 - 150 U/L    ALT 33 0 - 50 U/L    AST 23 0 - 45 U/L   INR     Status: Abnormal   Result Value Ref Range    INR 1.85 (H) 0.86 - 1.14   Asymptomatic SARS-CoV-2 COVID-19 Virus (Coronavirus) by PCR     Status: None    Specimen: Nasopharyngeal   Result Value Ref Range    SARS-CoV-2 Virus Specimen Source Nasopharyngeal     SARS-CoV-2 PCR Result NEGATIVE     SARS-CoV-2 PCR Comment       Testing was performed using the [employer] Xpress SARS-CoV-2 Assay on the Cepheid Gene-Xpert   Instrument Systems. Additional information about this Emergency Use Authorization (EUA)   assay can be found via the Lab Guide.     Magnesium     Status: None   Result Value Ref Range    Magnesium 1.6 1.6 - 2.3 mg/dL   Digoxin level     Status: None   Result Value Ref Range    Digoxin Level 0.8 0.5 - 2.0 ug/L   Nt probnp inpatient     Status: Abnormal   Result Value Ref Range    N-Terminal Pro BNP Inpatient 6,072 (H) 0 - 1,800 pg/mL   Lactate Dehydrogenase     Status: Abnormal   Result Value Ref Range    Lactate Dehydrogenase 271 (H) 81 - 234 U/L     Medications   furosemide (LASIX)  injection 20 mg (20 mg Intravenous Given 1/30/21 2030)        Assessments & Plan (with Medical Decision Making)   Patient presents with exertional shortness of breath as well as orthopnea.  She has known history of heart failure and has an LVAD.  BNP is elevated, there is pulmonary edema on her chest x-ray, she has swelling in her ankles and feet consistent with fluid overload.  Patient will be admitted to the cardiology/cards II service for treatment and monitoring.  She has no chest pain, doubt acute PE or ACS, no fever or cough to suggest infectious etiology.    I have reviewed the nursing notes. I have reviewed the findings, diagnosis, plan and need for follow up with the patient.    New Prescriptions    No medications on file       Final diagnoses:   Shortness of breath   LVAD (left ventricular assist device) present (H)   Acute pulmonary edema (H)       --  I, Andrzej Song, am serving as a trained medical scribe to document services personally performed by Annabella Stover MD, based on the provider's statements to me.     I, Annabella Stover MD, was physically present and have reviewed and verified the accuracy of this note documented by Andrzej Song.    Annabella Stover MD  [facility] EMERGENCY DEPARTMENT  1/30/2021     Annabella Stover MD  01/30/21 [id number]

## 2024-07-13 NOTE — PROGRESS NOTES
Pt A&Ox4 on room air, no complaints of pain, tolerating medications well per mar. VSS. IV fluids infusing. No complaints of nausea, was able to eat a little of his dinner tonight but didn't want to push it. Call light within reach, frequent checks made, needs met.     2230: pt having nausea again prn zofran given.  0030: pt complaining of more nausea/ middle abdomen pain, gave reglan for the nausea and then first tylenol pt pain was at 6/10 and then that wasn't helping pt pain increased to 8/10 augustine BAUTISTA received order for IV toradol one time.   0300: pt complaining of acid reflex, augustine bautista got an order for protonix once and tums prn.

## 2024-07-13 NOTE — CONSULTS
[facility] IP Report of Consultation Gastroenterology    7/13/2024    Kandis Xavier  male   Christopher Orta MD     [id number]  10/6/1950 Primary Care Physician  Alec Soto MD     Reason for Consultation: abd pain, N/V, reduced appetite    HPI: 73M w/ mmp, including IBS-C, chronic GERD, DM, HTN, among other issues.    Admitted w/ worsening EG/SX abd pain over the last 2-3 days, assoc w/ inability to eat d/t pain + persistent N/V. No diarrhea noted, no recent travel/sick contacts.    CT performed on admission, no significant findings that would contribute to pain. Pt does report some improvement in sxs overnight w/ IV PPI that was started(?).    PROBLEM LIST:     Patient Active Problem List   Diagnosis    Intractable vomiting with nausea    Intractable vomiting with nausea, unspecified vomiting type    Leukocytosis, unspecified type    Abdominal pain of unknown etiology    Leukocytosis    Acute diverticulitis    C. difficile colitis    Renal carcinoma, right (HCC)    Hyperglycemia    Abdominal pain, acute    HTN (hypertension)    Acute renal failure (HCC)    Acute renal failure, unspecified acute renal failure type (HCC)    Acute colitis    Colitis    Nausea and vomiting in adult    Nausea and vomiting, unspecified vomiting type    Renal insufficiency    EDILMA (acute kidney injury) (HCC)    Intractable vomiting    Nausea       PATIENT HISTORY:     Past Medical History:    Cancer (HCC)    renal    Diabetes (HCC)    Diverticulitis    Diverticulosis of large intestine    Essential hypertension      Past Surgical History:   Procedure Laterality Date    Colonoscopy N/A 6/16/2019    Procedure: COLONOSCOPY;  Surgeon: Won Rashid MD;  Location:  ENDOSCOPY    Kidney surgery      1/3 kidney removed    Other      partial nephrectomy of right kidney due to cancer      Family History   Problem Relation Age of Onset    Cancer Neg       Social History     Socioeconomic History    Marital status:    Tobacco Use     Smoking status: Never    Smokeless tobacco: Never   Vaping Use    Vaping status: Never Used   Substance and Sexual Activity    Alcohol use: No    Drug use: Yes     Types: Cannabis     Comment: occ     Social Determinants of Health     Food Insecurity: No Food Insecurity (7/12/2024)    Food Insecurity     Food Insecurity: Never true   Transportation Needs: No Transportation Needs (7/12/2024)    Transportation Needs     Lack of Transportation: No   Housing Stability: Low Risk  (7/12/2024)    Housing Stability     Housing Instability: No        Allergies;  Allergies   Allergen Reactions    Codeine NAUSEA AND VOMITING        Medications:    Current Facility-Administered Medications:     calcium carbonate (Tums) chewable tab 500 mg, 500 mg, Oral, QID PRN    pantoprazole (Protonix) 40 mg in sodium chloride 0.9% PF 10 mL IV push, 40 mg, Intravenous, Q12H    sodium chloride 0.9% infusion, , Intravenous, Continuous    heparin (Porcine) 5000 UNIT/ML injection 5,000 Units, 5,000 Units, Subcutaneous, Q8H RADHA    acetaminophen (Tylenol Extra Strength) tab 500 mg, 500 mg, Oral, Q4H PRN    metoclopramide (Reglan) 5 mg/mL injection 10 mg, 10 mg, Intravenous, Q8H PRN    ondansetron (Zofran-ODT) disintegrating tab 4 mg, 4 mg, Oral, Q6H PRN **OR** ondansetron (Zofran) 4 MG/2ML injection 4 mg, 4 mg, Intravenous, Q6H PRN     REVIEW OF SYSTEMS:   10pt ROS performed and o/w negative, see HPI for pertinent details.      EXAM:   /72 (BP Location: Right arm)   Pulse 103   Temp 98.7 °F (37.1 °C) (Oral)   Resp 16   Ht 5' 8\" (1.727 m)   Wt 167 lb 14.4 oz (76.2 kg)   SpO2 98%   BMI 25.53 kg/m²  Body mass index is 25.53 kg/m².  Gen: cooperative, pleasant, not diaphoretic, nad   HEENT: ncat, normal neck ROM, normal op w/o ulcer/exudate, anicteric, mmm   Resp/Chest: normal respiratory effort, not dyspneic, no incr WOB/cough  CV: no reported palpitations or syncope  Abd: nt, nd, no clinical features suggestive of peritoneal s/s  noted  Ext: no c/c/e   Skin: warm, perfused, no jaundice, no pallor  Neuro: aaox3, grossly intact, no asterixis noted, normal speech       LAB/IMAGING RESULTS:     Lab Results   Component Value Date    WBC 15.5 07/13/2024    HGB 13.3 07/13/2024    HCT 40.9 07/13/2024    .0 07/13/2024     [unfilled]  Lab Results   Component Value Date    WBC 15.5 07/13/2024    HGB 13.3 07/13/2024    HCT 40.9 07/13/2024    .0 07/13/2024    CREATSERUM 1.03 07/13/2024    BUN 6 07/13/2024     07/13/2024    K 3.8 07/13/2024     07/13/2024    CO2 24.0 07/13/2024     07/13/2024    CA 9.2 07/13/2024    ALB 3.9 07/13/2024    ALKPHO 100 07/13/2024    BILT 0.6 07/13/2024    TP 8.2 07/13/2024    AST 19 07/13/2024    ALT 23 07/13/2024    PSA 8.36 07/13/2024     Con CT AP 7/12/24  LIVER:  No enlargement, atrophy, abnormal density, or significant focal lesion.    BILIARY:  Calcification in the gallbladder wall is stable.  This may represent a calcified gallstone.  PANCREAS:  No lesion, fluid collection, ductal dilatation, or atrophy.    SPLEEN:  No enlargement or focal lesion.    KIDNEYS:  Multiple cysts in the kidneys.  Largest in the upper pole the right kidney measures 2.6 x 2.6 cm.  Largest in the upper pole left kidney measures 2.2 x 1.7 cm.  ADRENALS:  No mass or enlargement.    AORTA/VASCULAR:  No aneurysm or dissection.    RETROPERITONEUM:  No mass or adenopathy.    BOWEL/MESENTERY:  There is marked diverticulosis of the colon without evidence of acute diverticulitis.  The appendix is normal.  Infiltration of the mesenteric fat is again noted.  This is unchanged since 2023.  ABDOMINAL WALL:  No mass or hernia.    URINARY BLADDER:  No visible focal wall thickening, lesion, or calculus.    PELVIC NODES:  No adenopathy.    PELVIC ORGANS:  Enlarged prostate gland measures 6.1 cm.  BONES:  No bony lesion or fracture.    LUNG BASES:  No visible pulmonary or pleural disease.    OTHER:  Negative.                      Impression   CONCLUSION:       1. No evidence of an acute inflammatory process.     2. Marked diverticulosis of the colon without evidence of acute diverticulitis.     3. Diffuse infiltration of mesenteric fat is unchanged since prior exam.     4. Enlarged prostate gland is again noted.       Per Dr. Rashid's OV note 2/2022  Extensive inpt evaluation with repeated cts, GB imaging, EGD and colonoscopy negative. 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  Suspect functional bowel issues - constipation predominant IBS  Plan:  1. MIralax 1 dose per day.  2. Keep a diet and symptom diary over the next 1 - 2 weeks to determine whether there are any dietary factors, medications or stressors that correlate with symptoms development.  3. Dicyclomine 10 mg every 6 hours as needed for abdominal cramps. Continue omeprazole daily.  4. Colonoscopy in 2024.  5. Follow up with me in 2 - 3 months.     ASSESSMENT AND PLAN:   #Abdominal pain  #Nausea/vomiting  #Early satiety + anorexia    Interval clinical improvement overnight w/ IV PPI, pt feels like he's better able to tolerate FLD this morning w/ improved appetite.  Continue Protonix 40mg IV BID for now.  Diag MAC EGD tomorrow per pt's preference.  Will continue to follow w/ you.  Discussed w/ pt + RN today.    The patient indicates understanding of these issues and agrees to the plan.      A total of 60 minutes was spent in direct patient care + assessment, chart review, and care coordination today.    Christopher Orta MD  Department of Gastroenterology  [facility]  7/13/2024  11:36 AM

## 2024-07-13 NOTE — DIETARY MALNUTRITION NOTE
[facility]   part of [facility]    NUTRITION ASSESSMENT    Pt meets moderate malnutrition criteria at this time.    CRITERIA FOR MALNUTRITION DIAGNOSIS:  Criteria for non-severe malnutrition diagnosis: chronic illness related to wt loss 7.5% in 3 months and energy intake less than75% for greater than 1 month      NUTRITION INTERVENTION:    Meal and Snacks - Monitor and encourage adequate PO intake.   Medical Food Supplements - Ensure Plus High Protein BID. Rationale/use for oral supplements discussed. Coupons provided for home usage.    PATIENT STATUS: 07/13/24  73 year old male with hx of DM, HTN, here n/v and abdominal pain. Chart reviewed for +MST. Still with pain this AM, trying to eat some jello. Admits he hasn't been eating well since March when he had new dentures made. Still ill-fitting and uncomfortable. Tolerating soft foods. Added ensure BID. Per epic wt history pt is down 23# x 5 months. Denies food intolerances.      ANTHROPOMETRICS:  Ht: 172.7 cm (5' 8\")  Wt: 76.2 kg (167 lb 14.4 oz).   BMI: Body mass index is 25.53 kg/m².  IBW: 70 kg      WEIGHT HISTORY:   Weight loss: Yes, 23# (12%) x 5 months    Wt Readings from Last 10 Encounters:   07/12/24 76.2 kg (167 lb 14.4 oz)   02/27/24 86.2 kg (190 lb)   02/26/24 86.2 kg (190 lb)   08/11/23 81.4 kg (179 lb 8 oz)   09/09/22 83.9 kg (185 lb)   02/03/22 100.7 kg (222 lb)   01/06/22 83.5 kg (184 lb)   12/16/21 83.5 kg (184 lb)   11/30/21 90.7 kg (200 lb)   09/27/21 86.2 kg (190 lb)        NUTRITION:  Diet:       Procedures    Regular/General diet Is Patient on Accuchecks? No      Food Allergies: No  Cultural/Ethnic/Orthodoxy Preferences Addressed: Yes    Percent Meals Eaten (last 3 days)       None            GI system review: WNL Last BM: 7/12  Skin and wounds: intact    NUTRITION RELATED PHYSICAL FINDINGS:     1. Body Fat/Muscle Mass: no wasting noted, malnutrition related to PO intake and weight loss     2. Fluid Accumulation: none per RN  documentation     NUTRITION PRESCRIPTION: 76.2kg  Calories: 1900 -2280 calories/day (25-30 kcal/kg)  Protein:  grams protein/day (1.2-1.5 grams protein per kg)  Fluid: ~1 ml/kcal or per MD discretion    NUTRITION DIAGNOSIS/PROBLEM:  Malnutrition related to inability to take or tolerate as evidenced by documented/reported insufficient oral intake and documented/reported unintentional weight loss      MONITOR AND EVALUATE/NUTRITION GOALS:  PO intake of 75% of meals TID - New  PO intake of 75% of oral nutrition supplement/s - New  Weight stable within 1 to 2 lbs during admission - New      MEDICATIONS:  Reviewed    LABS:  POC glu 151-184    Pt is at Moderate nutrition risk    Alejandra Stahl RD, LDN  Clinical Nutrition  [id number]

## 2024-07-14 ENCOUNTER — ANESTHESIA (OUTPATIENT)
Dept: ENDOSCOPY | Facility: HOSPITAL | Age: 74
End: 2024-07-14
Payer: MEDICARE

## 2024-07-14 ENCOUNTER — APPOINTMENT (OUTPATIENT)
Dept: GENERAL RADIOLOGY | Facility: HOSPITAL | Age: 74
End: 2024-07-14
Attending: INTERNAL MEDICINE
Payer: MEDICARE

## 2024-07-14 LAB
ANION GAP SERPL CALC-SCNC: 8 MMOL/L (ref 0–18)
BASOPHILS # BLD AUTO: 0.04 X10(3) UL (ref 0–0.2)
BASOPHILS NFR BLD AUTO: 0.3 %
BUN BLD-MCNC: 8 MG/DL (ref 9–23)
CALCIUM BLD-MCNC: 8.9 MG/DL (ref 8.5–10.1)
CHLORIDE SERPL-SCNC: 106 MMOL/L (ref 98–112)
CO2 SERPL-SCNC: 23 MMOL/L (ref 21–32)
CREAT BLD-MCNC: 0.98 MG/DL
EGFRCR SERPLBLD CKD-EPI 2021: 82 ML/MIN/1.73M2 (ref 60–?)
EOSINOPHIL # BLD AUTO: 0.05 X10(3) UL (ref 0–0.7)
EOSINOPHIL NFR BLD AUTO: 0.4 %
ERYTHROCYTE [DISTWIDTH] IN BLOOD BY AUTOMATED COUNT: 14.4 %
GLUCOSE BLD-MCNC: 118 MG/DL (ref 70–99)
HCT VFR BLD AUTO: 38.9 %
HGB BLD-MCNC: 12.8 G/DL
IMM GRANULOCYTES # BLD AUTO: 0.04 X10(3) UL (ref 0–1)
IMM GRANULOCYTES NFR BLD: 0.3 %
LYMPHOCYTES # BLD AUTO: 2.52 X10(3) UL (ref 1–4)
LYMPHOCYTES NFR BLD AUTO: 19.5 %
MCH RBC QN AUTO: 27.5 PG (ref 26–34)
MCHC RBC AUTO-ENTMCNC: 32.9 G/DL (ref 31–37)
MCV RBC AUTO: 83.5 FL
MONOCYTES # BLD AUTO: 1.1 X10(3) UL (ref 0.1–1)
MONOCYTES NFR BLD AUTO: 8.5 %
NEUTROPHILS # BLD AUTO: 9.16 X10 (3) UL (ref 1.5–7.7)
NEUTROPHILS # BLD AUTO: 9.16 X10(3) UL (ref 1.5–7.7)
NEUTROPHILS NFR BLD AUTO: 71 %
OSMOLALITY SERPL CALC.SUM OF ELEC: 283 MOSM/KG (ref 275–295)
PLATELET # BLD AUTO: 320 10(3)UL (ref 150–450)
POTASSIUM SERPL-SCNC: 3.4 MMOL/L (ref 3.5–5.1)
RBC # BLD AUTO: 4.66 X10(6)UL
SODIUM SERPL-SCNC: 137 MMOL/L (ref 136–145)
WBC # BLD AUTO: 12.9 X10(3) UL (ref 4–11)

## 2024-07-14 PROCEDURE — 80048 BASIC METABOLIC PNL TOTAL CA: CPT | Performed by: INTERNAL MEDICINE

## 2024-07-14 PROCEDURE — 0DJ08ZZ INSPECTION OF UPPER INTESTINAL TRACT, VIA NATURAL OR ARTIFICIAL OPENING ENDOSCOPIC: ICD-10-PCS | Performed by: INTERNAL MEDICINE

## 2024-07-14 PROCEDURE — 71045 X-RAY EXAM CHEST 1 VIEW: CPT | Performed by: INTERNAL MEDICINE

## 2024-07-14 PROCEDURE — 85025 COMPLETE CBC W/AUTO DIFF WBC: CPT | Performed by: INTERNAL MEDICINE

## 2024-07-14 RX ORDER — ONDANSETRON 2 MG/ML
INJECTION INTRAMUSCULAR; INTRAVENOUS AS NEEDED
Status: DISCONTINUED | OUTPATIENT
Start: 2024-07-14 | End: 2024-07-14 | Stop reason: SURG

## 2024-07-14 RX ORDER — LIDOCAINE HYDROCHLORIDE 10 MG/ML
INJECTION, SOLUTION EPIDURAL; INFILTRATION; INTRACAUDAL; PERINEURAL AS NEEDED
Status: DISCONTINUED | OUTPATIENT
Start: 2024-07-14 | End: 2024-07-14 | Stop reason: SURG

## 2024-07-14 RX ORDER — DEXAMETHASONE SODIUM PHOSPHATE 4 MG/ML
VIAL (ML) INJECTION AS NEEDED
Status: DISCONTINUED | OUTPATIENT
Start: 2024-07-14 | End: 2024-07-14 | Stop reason: SURG

## 2024-07-14 RX ORDER — POTASSIUM CHLORIDE 20 MEQ/1
40 TABLET, EXTENDED RELEASE ORAL ONCE
Status: COMPLETED | OUTPATIENT
Start: 2024-07-14 | End: 2024-07-14

## 2024-07-14 RX ORDER — SODIUM CHLORIDE, SODIUM LACTATE, POTASSIUM CHLORIDE, CALCIUM CHLORIDE 600; 310; 30; 20 MG/100ML; MG/100ML; MG/100ML; MG/100ML
INJECTION, SOLUTION INTRAVENOUS CONTINUOUS PRN
Status: DISCONTINUED | OUTPATIENT
Start: 2024-07-14 | End: 2024-07-14 | Stop reason: SURG

## 2024-07-14 RX ORDER — METOCLOPRAMIDE HYDROCHLORIDE 5 MG/ML
INJECTION INTRAMUSCULAR; INTRAVENOUS AS NEEDED
Status: DISCONTINUED | OUTPATIENT
Start: 2024-07-14 | End: 2024-07-14 | Stop reason: SURG

## 2024-07-14 RX ADMIN — DEXAMETHASONE SODIUM PHOSPHATE 4 MG: 4 MG/ML VIAL (ML) INJECTION at 08:33:00

## 2024-07-14 RX ADMIN — LIDOCAINE HYDROCHLORIDE 25 MG: 10 INJECTION, SOLUTION EPIDURAL; INFILTRATION; INTRACAUDAL; PERINEURAL at 08:33:00

## 2024-07-14 RX ADMIN — METOCLOPRAMIDE HYDROCHLORIDE 10 MG: 5 INJECTION INTRAMUSCULAR; INTRAVENOUS at 08:33:00

## 2024-07-14 RX ADMIN — ONDANSETRON 4 MG: 2 INJECTION INTRAMUSCULAR; INTRAVENOUS at 08:40:00

## 2024-07-14 RX ADMIN — SODIUM CHLORIDE, SODIUM LACTATE, POTASSIUM CHLORIDE, CALCIUM CHLORIDE: 600; 310; 30; 20 INJECTION, SOLUTION INTRAVENOUS at 08:31:00

## 2024-07-14 NOTE — H&P
History and Physical for Endoscopic Procedure      Kandis Xavier Patient Status:  Inpatient    10/6/1950 MRN [id number]   Location [facility] 4NW-A Attending Janay Mir MD   Hosp Day # 2 PCP Alec Soto MD     Date of Consult:  2024     Reason for Consultation:  Epigastric pain, N/V, early satiety + anorexia    History of Present Illness:  Kandis Xavier is a a(n) 73 year old male.     History:  Past Medical History:    Cancer (HCC)    renal    Diabetes (HCC)    Diverticulitis    Diverticulosis of large intestine    Essential hypertension     Past Surgical History:   Procedure Laterality Date    Colonoscopy N/A 2019    Procedure: COLONOSCOPY;  Surgeon: Won Rashid MD;  Location:  ENDOSCOPY    Kidney surgery      /3 kidney removed    Other      partial nephrectomy of right kidney due to cancer     Family History   Problem Relation Age of Onset    Cancer Neg       reports that he has never smoked. He has never used smokeless tobacco. He reports current drug use. Drug: Cannabis. He reports that he does not drink alcohol.    Allergies:  Allergies   Allergen Reactions    Codeine NAUSEA AND VOMITING       Medications:    Current Facility-Administered Medications:     potassium chloride 40 mEq in 250mL sodium chloride 0.9% IVPB premix, 40 mEq, Intravenous, Once    calcium carbonate (Tums) chewable tab 500 mg, 500 mg, Oral, QID PRN    pantoprazole (Protonix) 40 mg in sodium chloride 0.9% PF 10 mL IV push, 40 mg, Intravenous, Q12H    sodium chloride 0.9% infusion, , Intravenous, Continuous    heparin (Porcine) 5000 UNIT/ML injection 5,000 Units, 5,000 Units, Subcutaneous, Q8H [facility]    acetaminophen (Tylenol Extra Strength) tab 500 mg, 500 mg, Oral, Q4H PRN    metoclopramide (Reglan) 5 mg/mL injection 10 mg, 10 mg, Intravenous, Q8H PRN    ondansetron (Zofran-ODT) disintegrating tab 4 mg, 4 mg, Oral, Q6H PRN **OR** ondansetron (Zofran) 4 MG/2ML injection 4 mg, 4 mg, Intravenous, Q6H PRN    Review of  Systems:  Gastrointestinal: negative other than specified in the HPI  General: negative other than specified in the HPI  Neurological: negative other than specified in the HPI  Cardiovascular: negative other than specified in the HPI  Respiratory: negative other than specified in the HPI    Physical Exam:  No acute distress  RRR  CTA B/L  SOFT +BS    Assessment/Plan:  Patient Active Problem List   Diagnosis    Intractable vomiting with nausea    Intractable vomiting with nausea, unspecified vomiting type    Leukocytosis, unspecified type    Abdominal pain of unknown etiology    Leukocytosis    Acute diverticulitis    C. difficile colitis    Renal carcinoma, right (HCC)    Hyperglycemia    Abdominal pain, acute    HTN (hypertension)    Acute renal failure (HCC)    Acute renal failure, unspecified acute renal failure type (HCC)    Acute colitis    Colitis    Nausea and vomiting in adult    Nausea and vomiting, unspecified vomiting type    Renal insufficiency    EDILMA (acute kidney injury) (HCC)    Intractable vomiting    Nausea       EGD today    Christopher Orta MD  7/14/2024  7:51 AM

## 2024-07-14 NOTE — ANESTHESIA POSTPROCEDURE EVALUATION
[facility]    Kandis Xavier Patient Status:  Inpatient   Age/Gender 73 year old male MRN [id number]   Location [facility] END[facility]COPY [facility] Attending Janay Mir MD   Hosp Day # 2 PCP Alec Soto MD       Anesthesia Post-op Note    ESOPHAGOGASTRODUODENOSCOPY (EGD)    Procedure Summary       Date: 07/14/24 Room / Location:  ENDOSCOPY 02 / [facility]    Anesthesia Start: 0831 Anesthesia Stop: 0852    Procedure: ESOPHAGOGASTRODUODENOSCOPY (EGD) Diagnosis:       Vomiting      (ESOPHAGITIS)    Surgeons: Christopher Orta MD Anesthesiologist: Colin Nieves MD    Anesthesia Type: MAC ASA Status: 2            Anesthesia Type: MAC    Vitals Value Taken Time   /72 07/14/24 0852   Temp 98.0 07/14/24 0852   Pulse 85 07/14/24 0852   Resp 16 07/14/24 0852   SpO2 96 % 07/14/24 0852   Vitals shown include unfiled device data.    Patient Location: Endoscopy    Anesthesia Type: MAC    Airway Patency: patent    Postop Pain Control: adequate    Mental Status: mildly sedated but able to meaningfully participate in the post-anesthesia evaluation    Nausea/Vomiting: none    Cardiopulmonary/Hydration status: stable euvolemic    Complications: no apparent anesthesia related complications    Postop vital signs: stable    Dental Exam: Unchanged from Preop    Patient to be discharged from PACU when criteria met.

## 2024-07-14 NOTE — PROGRESS NOTES
[facility] Hospitalist Progress Note       SUBJECTIVE:  S/p EGD with erosive esophagitis     Has some nausea post procedure    Low grade temp of 100.4 last night no fevers this AM     OBJECTIVE:  Scheduled Meds:    pantoprazole  40 mg Intravenous Q12H    heparin  5,000 Units Subcutaneous Q8H RADHA     Continuous Infusions:    sodium chloride 100 mL/hr at 07/13/24 1239     PRN Meds:   calcium carbonate    acetaminophen    metoclopramide    ondansetron **OR** ondansetron    Vitals  Vitals:    07/14/24 0935   BP:    Pulse: 91   Resp:    Temp:          Exam   Gen-    no acute distress, alert and oriented x 3   RESP-   Lungs CTA, normal respiratory effort  CV-      Heart RRR, no mgr  Abd-    soft, nondistended, nontender, bowel sounds present  Skin-    no rash  Neuro-  no focal neurologic deficits  Ext-      No edema in extremities   Psych- alert and oriented x 3     Labs:     Chem:  Recent Labs   Lab 07/12/24  1119 07/13/24  1000 07/14/24  0633    138 137   K 3.7 3.8 3.4*    106 106   CO2 20.0* 24.0 23.0   BUN 13 6* 8*   ALT 23 23  --    AST 13* 19  --    ALB 4.2 3.9  --        HEM:  Recent Labs   Lab 07/12/24  1120 07/13/24  1000 07/14/24  0633   WBC 20.9* 15.5* 12.9*   HGB 13.9 13.3 12.8*   .0 337.0 320.0   MCV 82.7 85.7 83.5       Coagulation:  Recent Labs   Lab 07/12/24  1120 07/13/24  1000 07/14/24  0633   HCT 42.2 40.9 38.9*   HGB 13.9 13.3 12.8*   .0 337.0 320.0       Cardiac:  No results for input(s): \"CKMB\" in the last 168 hours.    Invalid input(s): \"CKTOTAL\", \"CKMBINDEX\", \"TROPONINI\"    Urinalysis:   Recent Labs   Lab 07/13/24  0318   UROBILINOGEN Normal   NITRITE Negative          AP:    73 year old male with hx of DM, HTN, here n/v and abdominal pain.            Plan:       Abd pain  Nausea / vomiting  - CT abd no acute pathology noted  - IV fluids    - monitor - states had a bad night had a hard time tolerating diet, + abd pain  - IV ppi, GI consulted - s/p EGD with esophagitits noted,  continue PPI      Elevated WBC - improved  - check UA - neg  - check CT - no acute pathology      Low grade temp  - monitor   - no fevers this AM   - check xray      Enlarged prostate  - check PSA 8  - d/w pt to follow up with urology      DM  - diet controleld      HTN  - home meds        Prophylaxis:  DVT: heparin subcutaneous            Janay Mir MD   [facility] Hospitalist

## 2024-07-14 NOTE — PROGRESS NOTES
Pt A&Ox4 on room air, VSS other than temp at 100.4 tylenol administered. Tolerating medications well per mar. NPO at midnight for egd. AM heparin on hold. No complaints of pain or nausea/ vomiting overnight. Call light within reach, frequent checks made, needs met.

## 2024-07-14 NOTE — OPERATIVE REPORT
EGD Operative Report    Kandis Xavier Patient Status:  Inpatient    10/6/1950 MRN [id number]   [facility] Attending Janay Mir MD   Hosp Day #   2 PCP Alec Soto MD       Pre-Operative Diagnosis: Epigastric + substernal pain, nausea/vomiting    Post-Operative Diagnosis:  - Evidence of active erosive LA Grade C esophagitis in the distal esophagus, does appear to be improving on PPI therapy.  - Esophagus otherwise normal.  - Normal stomach.  - Normal duodenum.    Procedure Performed: EGD     Informed Consent: Informed consent for both the procedure and sedation were obtained from the patient. The potentially life-threatening complications of sedation, bleeding,  Perforation, transfusion or repeat endoscopy were reviewed along with the possible need for hospitalization, surgical management, transfusion or repeat endoscopy should one of these complications arise. The patient understands and is agreeable to proceed.    Sedation Type: MAC. Patient received sedation with monitored anesthesia provided by an Anesthesiologist  Moderate Sedation Time: None. Deep sedation provided by anesthesia.    Procedure Description: The patient was placed in the left lateral decubitus position.  A bite block was placed in the patient’s mouth. The endoscope was inserted through the mouth and advanced under direct visualization to the 3rd portion of the duodenum and was then withdrawn to examine the duodenal bulb and gastric antrum. The endoscope was then retroflexed to examine the incisura, GE junction, cardia, body, and fundus and then withdrawn to examine the esophagus. The endoscope was then removed from the patient. The patient tolerated the procedure well with no immediate complications and was transferred to the recovery area in stable  condition.    Complications:  No immediate complications noted.    EGD Findings:  - Evidence of active erosive LA Grade C esophagitis in the distal esophagus, does appear to be improving on PPI therapy.  - Esophagus otherwise normal.  - Normal stomach.  - Normal duodenum.    Recommendations:  - [facility] patient to [facility] for [facility] care.  - May resume FLD upon return to [facility], may advance as tolerated.  - Continue Protonix 40mg IV BID for now, but would discharge on 40mg PO BID x4 weeks (then daily thereafter).  - Continue anti-emetics as needed for now, cannot rule out recent uncaptured viral gastroenteritis aggravating more chronic GERD features.  - Will sign off for now, call if questions arise. OK to discharge home from GI standpoint when appropriate.  - Return to primary care provider as advised.  - Findings were discussed with the patient and requested family/acquaintance today following procedure.    Discharge:  The patient was given an after visit summary detailing the procedure, findings, recommendations and follow up plans.    Christopher Orta MD  7/14/2024  8:39 AM

## 2024-07-14 NOTE — PLAN OF CARE
Patient c/o nausea this morning.,denies abdominal pain. EGD done, procedure tolerated well. Full liquid diet tolerated well. Zofran given as needed. Patient's vitals signs stable.

## 2024-07-14 NOTE — PLAN OF CARE
Problem: Patient/Family Goals  Goal: Patient/Family Long Term Goal  Description: Patient's Long Term Goal: \"gain my weight back about 15 lbs back\"     Interventions:  - dietician consult  Resolution of nausea   - See additional Care Plan goals for specific interventions  Outcome: Progressing  Goal: Patient/Family Short Term Goal  Description: Patient's Short Term Goal: \"get back to working out\"    Interventions:   - resolution of current symptoms  Tolerating diet    - See additional Care Plan goals for specific interventions  Outcome: Progressing     Problem: GASTROINTESTINAL - ADULT  Goal: Minimal or absence of nausea and vomiting  Description: INTERVENTIONS:  - Maintain adequate hydration with IV or PO as ordered and tolerated  - Nasogastric tube to low intermittent suction as ordered  - Evaluate effectiveness of ordered antiemetic medications  - Provide nonpharmacologic comfort measures as appropriate  - Advance diet as tolerated, if ordered  - Obtain nutritional consult as needed  - Evaluate fluid balance  Outcome: Progressing     Problem: METABOLIC/FLUID AND ELECTROLYTES - ADULT  Goal: Hemodynamic stability and optimal renal function maintained  Description: INTERVENTIONS:  - Monitor labs and assess for signs and symptoms of volume excess or deficit  - Monitor intake, output and patient weight  - Monitor urine specific gravity, serum osmolarity and serum sodium as indicated or ordered  - Monitor response to interventions for patient's volume status, including labs, urine output, blood pressure (other measures as available)  - Encourage oral intake as appropriate  - Instruct patient on fluid and nutrition restrictions as appropriate  Outcome: Progressing     Problem: PAIN - ADULT  Goal: Verbalizes/displays adequate comfort level or patient's stated pain goal  Description: INTERVENTIONS:  - Encourage pt to monitor pain and request assistance  - Assess pain using appropriate pain scale  - Administer analgesics  based on type and severity of pain and evaluate response  - Implement non-pharmacological measures as appropriate and evaluate response  - Consider cultural and social influences on pain and pain management  - Manage/alleviate anxiety  - Utilize distraction and/or relaxation techniques  - Monitor for opioid side effects  - Notify MD/LIP if interventions unsuccessful or patient reports new pain  - Anticipate increased pain with activity and pre-medicate as appropriate  Outcome: Progressing

## 2024-07-15 VITALS
RESPIRATION RATE: 16 BRPM | SYSTOLIC BLOOD PRESSURE: 148 MMHG | BODY MASS INDEX: 25.44 KG/M2 | DIASTOLIC BLOOD PRESSURE: 68 MMHG | WEIGHT: 167.88 LBS | HEART RATE: 69 BPM | OXYGEN SATURATION: 97 % | HEIGHT: 68 IN | TEMPERATURE: 98 F

## 2024-07-15 LAB
ANION GAP SERPL CALC-SCNC: 4 MMOL/L (ref 0–18)
BASOPHILS # BLD AUTO: 0.03 X10(3) UL (ref 0–0.2)
BASOPHILS NFR BLD AUTO: 0.2 %
BUN BLD-MCNC: 14 MG/DL (ref 9–23)
CALCIUM BLD-MCNC: 9.2 MG/DL (ref 8.5–10.1)
CHLORIDE SERPL-SCNC: 111 MMOL/L (ref 98–112)
CO2 SERPL-SCNC: 26 MMOL/L (ref 21–32)
CREAT BLD-MCNC: 1 MG/DL
EGFRCR SERPLBLD CKD-EPI 2021: 80 ML/MIN/1.73M2 (ref 60–?)
EOSINOPHIL # BLD AUTO: 0 X10(3) UL (ref 0–0.7)
EOSINOPHIL NFR BLD AUTO: 0 %
ERYTHROCYTE [DISTWIDTH] IN BLOOD BY AUTOMATED COUNT: 14.1 %
GLUCOSE BLD-MCNC: 104 MG/DL (ref 70–99)
HCT VFR BLD AUTO: 36 %
HGB BLD-MCNC: 12 G/DL
IMM GRANULOCYTES # BLD AUTO: 0.08 X10(3) UL (ref 0–1)
IMM GRANULOCYTES NFR BLD: 0.5 %
LYMPHOCYTES # BLD AUTO: 2.67 X10(3) UL (ref 1–4)
LYMPHOCYTES NFR BLD AUTO: 17.8 %
MCH RBC QN AUTO: 27.5 PG (ref 26–34)
MCHC RBC AUTO-ENTMCNC: 33.3 G/DL (ref 31–37)
MCV RBC AUTO: 82.6 FL
MONOCYTES # BLD AUTO: 1.33 X10(3) UL (ref 0.1–1)
MONOCYTES NFR BLD AUTO: 8.9 %
NEUTROPHILS # BLD AUTO: 10.9 X10 (3) UL (ref 1.5–7.7)
NEUTROPHILS # BLD AUTO: 10.9 X10(3) UL (ref 1.5–7.7)
NEUTROPHILS NFR BLD AUTO: 72.6 %
OSMOLALITY SERPL CALC.SUM OF ELEC: 293 MOSM/KG (ref 275–295)
PLATELET # BLD AUTO: 316 10(3)UL (ref 150–450)
POTASSIUM SERPL-SCNC: 4 MMOL/L (ref 3.5–5.1)
POTASSIUM SERPL-SCNC: 4 MMOL/L (ref 3.5–5.1)
RBC # BLD AUTO: 4.36 X10(6)UL
SODIUM SERPL-SCNC: 141 MMOL/L (ref 136–145)
WBC # BLD AUTO: 15 X10(3) UL (ref 4–11)

## 2024-07-15 PROCEDURE — 85025 COMPLETE CBC W/AUTO DIFF WBC: CPT | Performed by: INTERNAL MEDICINE

## 2024-07-15 PROCEDURE — 84132 ASSAY OF SERUM POTASSIUM: CPT | Performed by: INTERNAL MEDICINE

## 2024-07-15 PROCEDURE — 80048 BASIC METABOLIC PNL TOTAL CA: CPT | Performed by: INTERNAL MEDICINE

## 2024-07-15 RX ORDER — PANTOPRAZOLE SODIUM 40 MG/1
TABLET, DELAYED RELEASE ORAL
Qty: 60 TABLET | Refills: 1 | Status: SHIPPED
Start: 2024-07-15

## 2024-07-15 NOTE — PLAN OF CARE
Cleared for discharge by GI and hospitalist, patient agrees with the plan.  Tolerated full liquids, advanced to soft, low fiber diet.  Requesting for some hutton and sausage which his current diet do not allow to have too much grease in them, he ordered pan cake, ate only few bites with some tenderness at the end of his meal.  Had a BM which the patient claimed the symptoms disappeared.   1100 Discharged via wheelchair, instructed on home medications and follow up with PCP, hematologist and urologist, voiced understanding.  Given script for Protonix.  Problem: GASTROINTESTINAL - ADULT  Goal: Minimal or absence of nausea and vomiting  Description: INTERVENTIONS:  - Maintain adequate hydration with IV or PO as ordered and tolerated  - Nasogastric tube to low intermittent suction as ordered  - Evaluate effectiveness of ordered antiemetic medications  - Provide nonpharmacologic comfort measures as appropriate  - Advance diet as tolerated, if ordered  - Obtain nutritional consult as needed  - Evaluate fluid balance  Outcome: Adequate for Discharge     Problem: METABOLIC/FLUID AND ELECTROLYTES - ADULT  Goal: Hemodynamic stability and optimal renal function maintained  Description: INTERVENTIONS:  - Monitor labs and assess for signs and symptoms of volume excess or deficit  - Monitor intake, output and patient weight  - Monitor urine specific gravity, serum osmolarity and serum sodium as indicated or ordered  - Monitor response to interventions for patient's volume status, including labs, urine output, blood pressure (other measures as available)  - Encourage oral intake as appropriate  - Instruct patient on fluid and nutrition restrictions as appropriate  Outcome: Adequate for Discharge     Problem: PAIN - ADULT  Goal: Verbalizes/displays adequate comfort level or patient's stated pain goal  Description: INTERVENTIONS:  - Encourage pt to monitor pain and request assistance  - Assess pain using appropriate pain scale  -  Administer analgesics based on type and severity of pain and evaluate response  - Implement non-pharmacological measures as appropriate and evaluate response  - Consider cultural and social influences on pain and pain management  - Manage/alleviate anxiety  - Utilize distraction and/or relaxation techniques  - Monitor for opioid side effects  - Notify MD/LIP if interventions unsuccessful or patient reports new pain  - Anticipate increased pain with activity and pre-medicate as appropriate  Outcome: Adequate for Discharge

## 2024-07-15 NOTE — DISCHARGE SUMMARY
[facility] Hospitalist Discharge Summary    Patient ID  Kandis Xavier  [id number]  73 year old  10/6/1950  Admit date: 7/12/2024  Discharge date: 7/15/24  Attending: Janay Mir MD   Primary Care Physician: Alec Soto MD   Reason for admission:  (see HPI on HP for further detail)  Discharge condition: fair  Disposition: home    Important follow up:  -PCP    -specialists: Urology and heme         Discharge med list     Medication List        START taking these medications      pantoprazole 40 MG Tbec  Commonly known as: Protonix  Take 40mg tablet twice a day for 1 month until 8/15 and then on 8/16 start taking it daily            CONTINUE taking these medications      amLODIPine 10 MG Tabs  Commonly known as: Norvasc  TAKE 1 TABLET BY MOUTH EVERY DAY     polyethylene glycol (PEG 3350) 17 g Pack  Commonly known as: Miralax     Tab-A-Kvng Tabs            STOP taking these medications      omeprazole 20 MG Cpdr  Commonly known as: PriLOSEC               Where to Get Your Medications        You can get these medications from any pharmacy    Bring a paper prescription for each of these medications  pantoprazole 40 MG Tbec         Discharge Diagnoses/Hospital course:   73 year old male with hx of DM, HTN, here n/v and abdominal pain.            Plan:       Abd pain  Nausea / vomiting  - CT abd no acute pathology noted   - IV ppi, GI consulted - s/p EGD with esophagitits noted, continue PPI      Elevated WBC - improved  - check UA - neg  - check CT - no acute pathology   - has hx of chronically elevated WBC, follow up w hematology      Low grade temp  - monitor   - no fevers this AM   - check xray - wnl UA negative      Enlarged prostate  - check PSA 8  - d/w pt to follow up with urology      DM  - diet controleld      HTN  - home meds    Consults:  IP CONSULT TO HOSPITALIST  NURSING CONSULT TO DIETITIAN  IP CONSULT TO GASTROENTEROLOGY    Radiology:  XR CHEST AP/PA (1 VIEW) (CPT=71045)    Result Date: 7/14/2024             PROCEDURE:  XR CHEST AP/PA (1 VIEW) (CPT=71045)  TECHNIQUE:  AP chest radiograph was obtained.  COMPARISON:  [facility] , XR, XR CHEST AP PORTABLE  (CPT=71045), 2/27/2024, 2:53 AM.  INDICATIONS:  eval for feverr  PATIENT STATED HISTORY: (As transcribed by Technologist)  Patient offered no additional history at this time.     FINDINGS: Cardiac silhouette and pulmonary vasculature are unremarkable. No consolidation, pleural effusion or pneumothorax. IMPRESSION: Unremarkable portable chest radiograph.   LOCATION:  [place] [id number]      Dictated by (CST): Angelito He MD on 7/14/2024 at 2:06 PM     Finalized by (CST): Angelito He MD on 7/14/2024 at 2:06 PM       CT ABDOMEN+PELVIS(CONTRAST ONLY)(CPT=74177)    Result Date: 7/12/2024  PROCEDURE:  CT ABDOMEN+PELVIS (CONTRAST ONLY) (CPT=74177)  COMPARISON:  [facility] , CT, CT ABDOMEN PELVIS IV CONTRAST, NO ORAL (ER), 8/11/2023, 7:39 PM.  INDICATIONS:  n/v abd pain. wbc 21  TECHNIQUE:  CT scanning was performed from the dome of the diaphragm to the pubic symphysis with non-ionic intravenous contrast material. Post contrast coronal MPR imaging was performed.  Dose reduction techniques were used. Dose information is transmitted to the ACR (American College of Radiology) NRDR (National Radiology Data Registry) which includes the Dose Index Registry.  PATIENT STATED HISTORY:(As transcribed by Technologist)  Patient has nausea vomiting, abdominal pain with elevated WBC   CONTRAST USED:  100cc of Isovue 370  FINDINGS:  LIVER:  No enlargement, atrophy, abnormal density, or significant focal lesion.  BILIARY:  Calcification in the gallbladder wall is stable.  This may represent a calcified gallstone. PANCREAS:  No lesion, fluid collection, ductal dilatation, or atrophy.  SPLEEN:  No enlargement or focal lesion.  KIDNEYS:  Multiple cysts in the kidneys.  Largest in the upper pole the right kidney measures 2.6 x 2.6 cm.  Largest in the upper pole left kidney measures 2.2 x 1.7 cm.  ADRENALS:  No mass or enlargement.  AORTA/VASCULAR:  No aneurysm or dissection.  RETROPERITONEUM:  No mass or adenopathy.  BOWEL/MESENTERY:  There is marked diverticulosis of the colon without evidence of acute diverticulitis.  The appendix is normal.  Infiltration of the mesenteric fat is again noted.  This is unchanged since 2023. ABDOMINAL WALL:  No mass or hernia.  URINARY BLADDER:  No visible focal wall thickening, lesion, or calculus.  PELVIC NODES:  No adenopathy.  PELVIC ORGANS:  Enlarged prostate gland measures 6.1 cm. BONES:  No bony lesion or fracture.  LUNG BASES:  No visible pulmonary or pleural disease.  OTHER:  Negative.             CONCLUSION:   1. No evidence of an acute inflammatory process.  2. Marked diverticulosis of the colon without evidence of acute diverticulitis.  3. Diffuse infiltration of mesenteric fat is unchanged since prior exam.  4. Enlarged prostate gland is again noted.   LOCATION:  [facility]   Dictated by (CST): Angelito He MD on 7/12/2024 at 4:11 PM     Finalized by (CST): Angelito He MD on 7/12/2024 at 4:14 PM         Operative reports:  Procedure(s) (LRB):  ESOPHAGOGASTRODUODENOSCOPY (EGD) (N/A)        Day of discharge exam:  Vitals:    07/15/24 0830   BP: 148/68   Pulse: 69   Resp:    Temp: 97.8 °F (36.6 °C)     No acute distress, alert    Lungs clear  Heart regular  Abdomen benign     Patient and/or family had opportunity to ask questions and expressed understanding and agreement with therapeutic plan as outlined      50  minutes spent on discharge    Janay Mir MD

## 2024-07-15 NOTE — DISCHARGE INSTRUCTIONS
Follow up with urology asap regarding the elevated psa and enlarged prostate seen on CT scan this is very important b/c if not evaluated it can be cancer    Follow up with Dr. Ireland b/c you have an elevated WBC that is chronic and should be evaluated by him

## 2024-07-15 NOTE — PROGRESS NOTES
Pt A&Ox4 on room air, no complaints of pain. Tolerating medications well per mar. VSS. Tolerating diet. Call light within reach, frequent checks made, needs met.

## 2024-07-18 NOTE — PAYOR COMM NOTE
ASKING FOR RECONSIDERATION INPT    ADMISSION REVIEW     Payor: [employer] ADV PPO  Subscriber #:  [id number]  Authorization Number: [id number]    Admit date: 7/12/24  Admit time:  5:02 PM       REVIEW DOCUMENTATION:     ED Provider Notes        ED Provider Notes signed by Gal Reid MD at 7/13/2024  6:09 AM       Author: Gal Reid MD Service: -- Author Type: Physician    Filed: 7/13/2024  6:09 AM Date of Service: 7/12/2024  1:43 PM Status: Signed    : Gal Reid MD (Physician)           Patient Seen in: [facility] Emergency Department      History     Chief Complaint   Patient presents with    Nausea/Vomiting/Diarrhea     Stated Complaint:     Subjective:   HPI    73-year-old male presents reporting 36 hours of intractable nausea and vomiting.  He denies any diarrhea.  He reports generalized abdominal discomfort that is nonradiating.  No aggravating or alleviating factors.  He says his coworker next to him had COVID and he is wondering if that could be causing the symptoms.  He denies any cough or congestion.  He reports a history of a previous kidney surgery secondary to cancer.  He says he has been unable to eat or drink anything over the past 2 days.    Objective:   Past Medical History:    Cancer (HCC)    renal    Diabetes (HCC)    Diverticulitis    Diverticulosis of large intestine    Essential hypertension              Past Surgical History:   Procedure Laterality Date    Colonoscopy N/A 6/16/2019    Procedure: COLONOSCOPY;  Surgeon: Wno Rashid MD;  Location:  ENDOSCOPY    Kidney surgery      1/3 kidney removed    Other      partial nephrectomy of right kidney due to cancer                Social History     Socioeconomic History    Marital status:    Tobacco Use    Smoking status: Never    Smokeless tobacco: Never   Vaping Use    Vaping status: Never Used   Substance and Sexual Activity    Alcohol use: No    Drug use: Yes     Types: Cannabis     Comment: occ      Social Determinants of Health     Food Insecurity: No Food Insecurity (7/12/2024)    Food Insecurity     Food Insecurity: Never true   Transportation Needs: No Transportation Needs (7/12/2024)    Transportation Needs     Lack of Transportation: No   Housing Stability: Low Risk  (7/12/2024)    Housing Stability     Housing Instability: No              Review of Systems    Positive for stated Chief Complaint: Nausea/Vomiting/Diarrhea    Other systems are as noted in HPI.  Constitutional and vital signs reviewed.      All other systems reviewed and negative except as noted above.    Physical Exam     ED Triage Vitals [07/12/24 1116]   /79   Pulse 106   Resp (!) 28   Temp 99.6 °F (37.6 °C)   Temp src Oral   SpO2 99 %   O2 Device None (Room air)       Current Vitals:   Vital Signs  BP: 142/74  Pulse: 105  Resp: 16  Temp: 98.9 °F (37.2 °C)  Temp src: Oral  MAP (mmHg): 91    Oxygen Therapy  SpO2: 98 %  O2 Device: None (Room air)  Pulse Oximetry Type: Continuous  Oximetry Probe Site Changed: No  Pulse Ox Probe Location: Left hand            Physical Exam    General:  Vitals as listed.  Appears moderately ill  HEENT: Sclerae anicteric.  Conjunctivae show no pallor.  Oropharynx clear, mucous membranes moist   Lungs: good air exchange and clear   Heart: Resting tachycardia  Abdomen: Soft and nondistended.  Mild generalized tenderness on palpation.  Normal bowel sounds.  No abdominal masses.  No peritoneal signs   Extremities: no edema, normal peripheral pulses   Neuro: Alert oriented and nonfocal   Skin: no rashes or nodules    ED Course     Labs Reviewed   COMP METABOLIC PANEL (14) - Abnormal; Notable for the following components:       Result Value    Glucose 165 (*)     CO2 20.0 (*)     Creatinine 1.42 (*)     Calcium, Total 10.2 (*)     eGFR-Cr 53 (*)     AST 13 (*)     Total Protein 9.1 (*)     Globulin  4.9 (*)     A/G Ratio 0.9 (*)     All other components within normal limits   URINALYSIS WITH CULTURE REFLEX  - Abnormal; Notable for the following components:    Urine Color Colorless (*)     All other components within normal limits   CBC W/ DIFFERENTIAL - Abnormal; Notable for the following components:    WBC 20.9 (*)     Neutrophil Absolute Prelim 17.12 (*)     Neutrophil Absolute 17.12 (*)     Monocyte Absolute 1.73 (*)     All other components within normal limits   LIPASE - Normal   SARS-COV-2/FLU A AND B/RSV BY PCR (GENEXPERT) - Normal    Narrative:     This test is intended for the qualitative detection and differentiation of SARS-CoV-2, influenza A, influenza B, and respiratory syncytial virus (RSV) viral RNA in nasopharyngeal or nares swabs from individuals suspected of respiratory viral infection consistent with COVID-19 by their healthcare provider. Signs and symptoms of respiratory viral infection due to SARS-CoV-2, influenza, and RSV can be similar.    Test performed using the Xpert Xpress SARS-CoV-2/FLU/RSV (real time RT-PCR)  assay on the GeneXpert instrument, [employer], [place] [address].   This test is being used under the Food and Drug Administration's Emergency Use Authorization.    The authorized Fact Sheet for Healthcare Providers for this assay is available upon request from the laboratory.   CBC WITH DIFFERENTIAL WITH PLATELET    Narrative:     The following orders were created for panel order CBC With Differential With Platelet.  Procedure                               Abnormality         Status                     ---------                               -----------         ------                     CBC W/ DIFFERENTIAL[[id number]]          Abnormal            Final result                 Please view results for these tests on the individual orders.   CBC WITH DIFFERENTIAL WITH PLATELET   COMP METABOLIC PANEL (14)   RAINBOW DRAW LAVENDER   RAINBOW DRAW LIGHT GREEN             CT ABDOMEN+PELVIS(CONTRAST ONLY)(CPT=74177)    Result Date: 7/12/2024  PROCEDURE:  CT ABDOMEN+PELVIS (CONTRAST ONLY)  (CPT=74177)  COMPARISON:  [facility] , CT, CT ABDOMEN PELVIS IV CONTRAST, NO ORAL (ER), 8/11/2023, 7:39 PM.  INDICATIONS:  n/v abd pain. wbc 21  TECHNIQUE:  CT scanning was performed from the dome of the diaphragm to the pubic symphysis with non-ionic intravenous contrast material. Post contrast coronal MPR imaging was performed.  Dose reduction techniques were used. Dose information is transmitted to the ACR (American College of Radiology) NRDR (National Radiology Data Registry) which includes the Dose Index Registry.  PATIENT STATED HISTORY:(As transcribed by Technologist)  Patient has nausea vomiting, abdominal pain with elevated WBC   CONTRAST USED:  100cc of Isovue 370  FINDINGS:  LIVER:  No enlargement, atrophy, abnormal density, or significant focal lesion.  BILIARY:  Calcification in the gallbladder wall is stable.  This may represent a calcified gallstone. PANCREAS:  No lesion, fluid collection, ductal dilatation, or atrophy.  SPLEEN:  No enlargement or focal lesion.  KIDNEYS:  Multiple cysts in the kidneys.  Largest in the upper pole the right kidney measures 2.6 x 2.6 cm.  Largest in the upper pole left kidney measures 2.2 x 1.7 cm. ADRENALS:  No mass or enlargement.  AORTA/VASCULAR:  No aneurysm or dissection.  RETROPERITONEUM:  No mass or adenopathy.  BOWEL/MESENTERY:  There is marked diverticulosis of the colon without evidence of acute diverticulitis.  The appendix is normal.  Infiltration of the mesenteric fat is again noted.  This is unchanged since 2023. ABDOMINAL WALL:  No mass or hernia.  URINARY BLADDER:  No visible focal wall thickening, lesion, or calculus.  PELVIC NODES:  No adenopathy.  PELVIC ORGANS:  Enlarged prostate gland measures 6.1 cm. BONES:  No bony lesion or fracture.  LUNG BASES:  No visible pulmonary or pleural disease.  OTHER:  Negative.             CONCLUSION:   1. No evidence of an acute inflammatory process.  2. Marked diverticulosis of the colon without evidence of acute  diverticulitis.  3. Diffuse infiltration of mesenteric fat is unchanged since prior exam.  4. Enlarged prostate gland is again noted.   LOCATION:  [facility]   Dictated by (CST): Angelito He MD on 7/12/2024 at 4:11 PM     Finalized by (CST): Angelito He MD on 7/12/2024 at 4:14 PM              MDM      73-year-old male presents reporting he has been unable to tolerate anything by mouth over the last 2 days.  He arrives tachycardic and nauseous.    Differential includes but is not limited to gastroenteritis, bowel obstruction, metabolic disturbance, dehydration, a life threat.    CBC, CMP, viral nasal swab, CT abdomen and pelvis ordered for further evaluation.  0.9 NS 1 L bolus x 2, Reglan 10 mg IV    Laboratory evaluation shows EDILMA with a creatinine of 1.4.  He also has a significant leukocytosis at 20.9.  He remains tachycardic after IV hydration here.  Low-grade temp at 99.6 °F.  CT is pending.  The patient is moderately ill-appearing with tachycardia and unable to tolerate p.o. despite antiemetics.  Will admit for further management.  He is agreeable.  Discussed with admitting physician.    My independent interpretation of CT of the abdomen pelvis is that there is no evidence of free air.    Radiology reports \"no evidence of an acute inflammatory process\".          Admission disposition: 7/13/2024  6:09 AM                                        Medical Decision Making      Disposition and Plan     Clinical Impression:  1. EDILMA (acute kidney injury) (HCC)    2. Intractable vomiting    3. Leukocytosis, unspecified type         Disposition:  Admit  7/13/2024  6:09 am    Follow-up:  No follow-up provider specified.        Medications Prescribed:  Current Discharge Medication List                            Hospital Problems       Present on Admission  Date Reviewed: 2/27/2024            ICD-10-CM Noted POA    * (Principal) EDILMA (acute kidney injury) (HCC) N17.9 7/12/2024 Unknown    Intractable vomiting R11.10  7/12/2024 Unknown    Leukocytosis, unspecified type D72.829 1/3/2018 Unknown    Nausea R11.0 7/12/2024 Unknown                     Signed by Gal Reid MD on 7/13/2024  6:09 AM         MEDICATIONS ADMINISTERED IN LAST 1 DAY:  Administration History       None            Vitals (last day) before discharge       Date/Time Temp Pulse Resp BP SpO2 Weight O2 Device O2 Flow Rate (L/min) [facility]    07/15/24 0830 97.8 °F (36.6 °C) 69 -- 148/68 97 % -- None (Room air) --     07/15/24 0451 98.4 °F (36.9 °C) 60 16 134/61 94 % -- None (Room air) --     07/14/24 1957 99.4 °F (37.4 °C) 79 16 138/68 99 % -- None (Room air) --     07/14/24 1100 98.7 °F (37.1 °C) 99 -- 142/76 98 % -- None (Room air) --     07/14/24 0935 -- 91 -- -- 94 % -- -- -- OR    07/14/24 0930 -- 97 18 159/73 93 % -- -- -- OR    07/14/24 0925 -- 80 14 170/80 91 % -- -- -- OR    07/14/24 0920 -- 78 14 165/77 92 % -- -- -- OR    07/14/24 0918 -- 82 16 165/77 92 % -- -- -- OR    07/14/24 0915 -- 87 15 186/101 99 % -- -- -- OR    07/14/24 0910 -- 85 16 152/81 96 % -- -- -- OR    07/14/24 0905 -- 77 16 159/79 99 % -- -- -- OR    07/14/24 0900 -- 78 18 147/82 97 % -- -- -- OR    07/14/24 0855 -- 81 16 131/83 98 % -- -- -- OR    07/14/24 0850 -- 87 14 104/72 95 % -- -- -- OR    07/14/24 0845 -- 93 16 77/36 95 % -- -- -- DC    07/14/24 0752 98.7 °F (37.1 °C) 84 -- 150/65 100 % -- None (Room air) -- ES    07/14/24 0551 98.9 °F (37.2 °C) 83 -- 144/77 98 % -- None (Room air) -- SV           7/12 H&P        Chief Complaint: nausea /vomiting      History of Present Illness: Patient is a 73 year old male with hx of DM, HTN, here n/v and abdominal pain.      Started about 2 days ago.  Pt states he had multiple episodes of emesis, no blood noted. No diarrhea, no dysuria no fevers noted no chest pain no SOB noted.  Pt presented today due to worsening symptoms.  He had a BM in the ED per pt that helped his pain.  No new meds no new foods, no recent travel, no sick  contacts     In the ED, WBC around 20, CT abd pending.          Plan:       Abd pain  Nausea / vomiting  - CT abd pending  - IV fluids, diet as tolerated pending CT   - monitor      Elevated WBC  - check UA  - check CT  - might be related to dehydration given the nausea/vomiting      DM  - diet controleld      HTN  - home meds        Prophylaxis:  DVT: SCD     7/13 GI CONSULT NOTE  Reason for Consultation: abd pain, N/V, reduced appetite     HPI: 73M w/ mmp, including IBS-C, chronic GERD, DM, HTN, among other issues.    Admitted w/ worsening EG/SX abd pain over the last 2-3 days, assoc w/ inability to eat d/t pain + persistent N/V. No diarrhea noted, no recent travel/sick contacts.     CT performed on admission, no significant findings that would contribute to pain. Pt does report some improvement in sxs overnight w/ IV PPI that was started(?).         EXAM:   /72 (BP Location: Right arm)   Pulse 103   Temp 98.7 °F (37.1 °C) (Oral)   Resp 16   Ht 5' 8\" (1.727 m)   Wt 167 lb 14.4 oz (76.2 kg)   SpO2 98%   BMI 25.53 kg/m²  Body mass index is 25.53 kg/m².    Lab Results   Component Value Date     WBC 15.5 07/13/2024     HGB 13.3 07/13/2024     HCT 40.9 07/13/2024     .0 07/13/2024     CREATSERUM 1.03 07/13/2024     BUN 6 07/13/2024      07/13/2024     K 3.8 07/13/2024      07/13/2024     CO2 24.0 07/13/2024      07/13/2024     CA 9.2 07/13/2024     ALB 3.9 07/13/2024     ALKPHO 100 07/13/2024     BILT 0.6 07/13/2024     TP 8.2 07/13/2024     AST 19 07/13/2024     ALT 23 07/13/2024     PSA 8.36 07/13/2024       ASSESSMENT AND PLAN:   #Abdominal pain  #Nausea/vomiting  #Early satiety + anorexia     Interval clinical improvement overnight w/ IV PPI, pt feels like he's better able to tolerate FLD this morning w/ improved appetite.  Continue Protonix 40mg IV BID for now.  Diag MAC EGD tomorrow per pt's preference.  Will continue to follow w/ you.  Discussed w/ pt + RN today.     The  patient indicates understanding of these issues and agrees to the plan.     7/13 HOSPITALIST NOTE    SUBJECTIVE:  Had a bad night states had abd pain couldn't eat     No chest pain no SOB no diarrhea     CT okay, prostate was enalarged     OBJECTIVE:  Scheduled Meds:   Scheduled Medications    pantoprazole  40 mg Intravenous Q12H    heparin  5,000 Units Subcutaneous Q8H [facility]         Continuous Infusions:   Medication Infusions    sodium chloride 100 mL/hr at 07/13/24 0300         AP:      73 year old male with hx of DM, HTN, here n/v and abdominal pain.            Plan:       Abd pain  Nausea / vomiting  - CT abd no acute pathology noted  - IV fluids    - monitor - states had a bad night had a hard time tolerating diet, + abd pain  - IV ppi, GI consulted      Elevated WBC - labs pending this AM  - check UA - neg  - check CT - no acute pathology   - might be related to dehydration given the nausea/vomiting      Enlarged prostate  - check PSA  - d/w pt      DM  - diet controleld      HTN  - home meds        Prophylaxis:  DVT: SCD           7/14  HOSPITALIST NOTE       SUBJECTIVE:  S/p EGD with erosive esophagitis      Has some nausea post procedure     Low grade temp of 100.4 last night no fevers this AM      OBJECTIVE:  Scheduled Meds:   Scheduled Medications    pantoprazole  40 mg Intravenous Q12H    heparin  5,000 Units Subcutaneous Q8H [facility]        Recent Labs   Lab 07/12/24  1119 07/13/24  1000 07/14/24  0633    138 137   K 3.7 3.8 3.4*    106 106   CO2 20.0* 24.0 23.0   BUN 13 6* 8*   ALT 23 23  --    AST 13* 19  --    ALB 4.2 3.9  --          HEM:        Recent Labs   Lab 07/12/24  1120 07/13/24  1000 07/14/24  0633   WBC 20.9* 15.5* 12.9*   HGB 13.9 13.3 12.8*   .0 337.0 320.0   MCV 82.7 85.7 83.5         Coagulation:        Recent Labs   Lab 07/12/24  1120 07/13/24  1000 07/14/24  0633   HCT 42.2 40.9 38.9*   HGB 13.9 13.3 12.8*   .0 337.0 320.0          Plan:       Abd pain  Nausea /  vomiting  - CT abd no acute pathology noted  - IV fluids    - monitor - states had a bad night had a hard time tolerating diet, + abd pain  - IV ppi, GI consulted - s/p EGD with esophagitits noted, continue PPI      Elevated WBC - improved  - check UA - neg  - check CT - no acute pathology      Low grade temp  - monitor   - no fevers this AM   - check xray      Enlarged prostate  - check PSA 8  - d/w pt to follow up with urology      DM  - diet controleld      HTN  - home meds     7/14 GI OP NOTE       Pre-Operative Diagnosis: Epigastric + substernal pain, nausea/vomiting     Post-Operative Diagnosis:  - Evidence of active erosive LA Grade C esophagitis in the distal esophagus, does appear to be improving on PPI therapy.  - Esophagus otherwise normal.  - Normal stomach.  - Normal duodenum.     Procedure Performed: EGD       EGD Findings:  - Evidence of active erosive LA Grade C esophagitis in the distal esophagus, does appear to be improving on PPI therapy.  - Esophagus otherwise normal.  - Normal stomach.  - Normal duodenum.     Recommendations:  - Return patient to [facility] for ongoing care.  - May resume FLD upon return to [facility], may advance as tolerated.  - Continue Protonix 40mg IV BID for now, but would discharge on 40mg PO BID x4 weeks (then daily thereafter).  - Continue anti-emetics as needed for now, cannot rule out recent uncaptured viral gastroenteritis aggravating more chronic GERD features.  - Will sign off for now, call if questions arise. OK to discharge home from GI standpoint when appropriate.  - Return to primary care provider as advised.  - Findings were discussed with the patient and requested family/acquaintance today following procedure.     Discharge:  The patient was given an after visit summary detailing the procedure, findings, recommendations and follow up plans.

## 2024-07-18 NOTE — PAYOR COMM NOTE
--------------  DISCHARGE REVIEW    Payor: [employer] ADV PPO  Subscriber #:  [id number]  Authorization Number: [id number]    Admit date: 7/12/24  Admit time:   5:02 PM  Discharge Date: 7/15/2024 11:06 AM     Admitting Physician: Franky Munoz MD  Attending Physician:  Sudha att. providers found  Primary Care Physician: Alec Soto MD          Discharge Summary Notes        Discharge Summary signed by Janay Mir MD at 7/15/2024 11:58 AM       Author: Janay Mir MD Specialty: HOSPITALIST, Internal Medicine Author Type: Physician    Filed: 7/15/2024 11:58 AM Date of Service: 7/15/2024  9:56 AM Status: Signed    : Janay Mir MD (Physician)         [facility] Hospitalist Discharge Summary    Patient ID  Kandis Xavier  [id number]  73 year old  10/6/1950  Admit date: 7/12/2024  Discharge date: 7/15/24  Attending: Janay Mir MD   Primary Care Physician: lAec Soto MD   Reason for admission:  (see HPI on HP for further detail)  Discharge condition: fair  Disposition: home    Important follow up:  -PCP    -specialists: Urology and heme         Discharge med list     Medication List        START taking these medications      pantoprazole 40 MG Tbec  Commonly known as: Protonix  Take 40mg tablet twice a day for 1 month until 8/15 and then on 8/16 start taking it daily            CONTINUE taking these medications      amLODIPine 10 MG Tabs  Commonly known as: Norvasc  TAKE 1 TABLET BY MOUTH EVERY DAY     polyethylene glycol (PEG 3350) 17 g Pack  Commonly known as: Miralax     Tab-A-Kvng Tabs            STOP taking these medications      omeprazole 20 MG Cpdr  Commonly known as: PriLOSEC               Where to Get Your Medications        You can get these medications from any pharmacy    Bring a paper prescription for each of these medications  pantoprazole 40 MG Tbec         Discharge Diagnoses/Hospital course:   73 year old male with hx of DM, HTN, here n/v and abdominal pain.            Plan:       Abd  pain  Nausea / vomiting  - CT abd no acute pathology noted   - IV ppi, GI consulted - s/p EGD with esophagitits noted, continue PPI      Elevated WBC - improved  - check UA - neg  - check CT - no acute pathology   - has hx of chronically elevated WBC, follow up w hematology      Low grade temp  - monitor   - no fevers this AM   - check xray - wnl UA negative      Enlarged prostate  - check PSA 8  - d/w pt to follow up with urology      DM  - diet controleld      HTN  - home meds    Consults:  IP CONSULT TO HOSPITALIST  NURSING CONSULT TO DIETITIAN  IP CONSULT TO GASTROENTEROLOGY    Radiology:  XR CHEST AP/PA (1 VIEW) (CPT=71045)    Result Date: 7/14/2024            PROCEDURE:  XR CHEST AP/PA (1 VIEW) (CPT=71045)  TECHNIQUE:  AP chest radiograph was obtained.  COMPARISON:  [facility] , XR, XR CHEST AP PORTABLE  (CPT=71045), 2/27/2024, 2:53 AM.  INDICATIONS:  eval for feverr  PATIENT STATED HISTORY: (As transcribed by Technologist)  Patient offered no additional history at this time.     FINDINGS: Cardiac silhouette and pulmonary vasculature are unremarkable. No consolidation, pleural effusion or pneumothorax. IMPRESSION: Unremarkable portable chest radiograph.   LOCATION:  [place] [id number]      Dictated by (CST): Angelito He MD on 7/14/2024 at 2:06 PM     Finalized by (CST): Angelito He MD on 7/14/2024 at 2:06 PM       CT ABDOMEN+PELVIS(CONTRAST ONLY)(CPT=74177)    Result Date: 7/12/2024  PROCEDURE:  CT ABDOMEN+PELVIS (CONTRAST ONLY) (CPT=74177)  COMPARISON:  [facility] , CT, CT ABDOMEN PELVIS IV CONTRAST, NO ORAL (ER), 8/11/2023, 7:39 PM.  INDICATIONS:  n/v abd pain. wbc 21  TECHNIQUE:  CT scanning was performed from the dome of the diaphragm to the pubic symphysis with non-ionic intravenous contrast material. Post contrast coronal MPR imaging was performed.  Dose reduction techniques were used. Dose information is transmitted to the ACR (American College of Radiology) NRDR (National Radiology Data Registry) which  includes the Dose Index Registry.  PATIENT STATED HISTORY:(As transcribed by Technologist)  Patient has nausea vomiting, abdominal pain with elevated WBC   CONTRAST USED:  100cc of Isovue 370  FINDINGS:  LIVER:  No enlargement, atrophy, abnormal density, or significant focal lesion.  BILIARY:  Calcification in the gallbladder wall is stable.  This may represent a calcified gallstone. PANCREAS:  No lesion, fluid collection, ductal dilatation, or atrophy.  SPLEEN:  No enlargement or focal lesion.  KIDNEYS:  Multiple cysts in the kidneys.  Largest in the upper pole the right kidney measures 2.6 x 2.6 cm.  Largest in the upper pole left kidney measures 2.2 x 1.7 cm. ADRENALS:  No mass or enlargement.  AORTA/VASCULAR:  No aneurysm or dissection.  RETROPERITONEUM:  No mass or adenopathy.  BOWEL/MESENTERY:  There is marked diverticulosis of the colon without evidence of acute diverticulitis.  The appendix is normal.  Infiltration of the mesenteric fat is again noted.  This is unchanged since 2023. ABDOMINAL WALL:  No mass or hernia.  URINARY BLADDER:  No visible focal wall thickening, lesion, or calculus.  PELVIC NODES:  No adenopathy.  PELVIC ORGANS:  Enlarged prostate gland measures 6.1 cm. BONES:  No bony lesion or fracture.  LUNG BASES:  No visible pulmonary or pleural disease.  OTHER:  Negative.             CONCLUSION:   1. No evidence of an acute inflammatory process.  2. Marked diverticulosis of the colon without evidence of acute diverticulitis.  3. Diffuse infiltration of mesenteric fat is unchanged since prior exam.  4. Enlarged prostate gland is again noted.   LOCATION:  [facility]   Dictated by (CST): Angelito He MD on 7/12/2024 at 4:11 PM     Finalized by (CST): Angelito He MD on 7/12/2024 at 4:14 PM         Operative reports:  Procedure(s) (LRB):  ESOPHAGOGASTRODUODENOSCOPY (EGD) (N/A)        Day of discharge exam:  Vitals:    07/15/24 0830   BP: 148/68   Pulse: 69   Resp:    Temp: 97.8 °F (36.6 °C)      No acute distress, alert    Lungs clear  Heart regular  Abdomen benign     Patient and/or family had opportunity to ask questions and expressed understanding and agreement with therapeutic plan as outlined      50  minutes spent on discharge    Janay Mir MD    Electronically signed by Janay Mir MD on 7/15/2024 11:58 AM         REVIEWER COMMENTS

## 2025-01-30 ENCOUNTER — APPOINTMENT (OUTPATIENT)
Dept: CT IMAGING | Facility: HOSPITAL | Age: 75
End: 2025-01-30
Attending: EMERGENCY MEDICINE
Payer: MEDICARE

## 2025-01-30 ENCOUNTER — HOSPITAL ENCOUNTER (EMERGENCY)
Facility: HOSPITAL | Age: 75
Discharge: HOME | End: 2025-01-30
Attending: EMERGENCY MEDICINE
Payer: MEDICARE

## 2025-01-30 VITALS
RESPIRATION RATE: 16 BRPM | HEART RATE: 78 BPM | SYSTOLIC BLOOD PRESSURE: 158 MMHG | TEMPERATURE: 98 F | DIASTOLIC BLOOD PRESSURE: 74 MMHG | OXYGEN SATURATION: 99 %

## 2025-01-30 DIAGNOSIS — R11.2 NAUSEA AND VOMITING IN ADULT: Primary | ICD-10-CM

## 2025-01-30 DIAGNOSIS — N28.1 RENAL CYST: ICD-10-CM

## 2025-01-30 DIAGNOSIS — N17.9 AKI (ACUTE KIDNEY INJURY): ICD-10-CM

## 2025-01-30 LAB
ALBUMIN SERPL-MCNC: 5.4 G/DL (ref 3.2–4.8)
ALBUMIN/GLOB SERPL: 1.4 {RATIO} (ref 1–2)
ALP LIVER SERPL-CCNC: 107 U/L
ALT SERPL-CCNC: 27 U/L
ANION GAP SERPL CALC-SCNC: 12 MMOL/L (ref 0–18)
AST SERPL-CCNC: 26 U/L (ref ?–34)
BASOPHILS # BLD AUTO: 0.03 X10(3) UL (ref 0–0.2)
BASOPHILS NFR BLD AUTO: 0.2 %
BILIRUB SERPL-MCNC: 0.8 MG/DL (ref 0.2–1.1)
BILIRUB UR QL STRIP.AUTO: NEGATIVE
BUN BLD-MCNC: 20 MG/DL (ref 9–23)
CALCIUM BLD-MCNC: 10.3 MG/DL (ref 8.7–10.6)
CHLORIDE SERPL-SCNC: 105 MMOL/L (ref 98–112)
CO2 SERPL-SCNC: 23 MMOL/L (ref 21–32)
CREAT BLD-MCNC: 1.66 MG/DL
EGFRCR SERPLBLD CKD-EPI 2021: 43 ML/MIN/1.73M2 (ref 60–?)
EOSINOPHIL # BLD AUTO: 0 X10(3) UL (ref 0–0.7)
EOSINOPHIL NFR BLD AUTO: 0 %
ERYTHROCYTE [DISTWIDTH] IN BLOOD BY AUTOMATED COUNT: 15.2 %
FLUAV + FLUBV RNA SPEC NAA+PROBE: NEGATIVE
FLUAV + FLUBV RNA SPEC NAA+PROBE: NEGATIVE
GLOBULIN PLAS-MCNC: 3.8 G/DL (ref 2–3.5)
GLUCOSE BLD-MCNC: 146 MG/DL (ref 70–99)
GLUCOSE UR STRIP.AUTO-MCNC: NORMAL MG/DL
HCT VFR BLD AUTO: 47 %
HGB BLD-MCNC: 15.9 G/DL
HYALINE CASTS #/AREA URNS AUTO: PRESENT /LPF
IMM GRANULOCYTES # BLD AUTO: 0.08 X10(3) UL (ref 0–1)
IMM GRANULOCYTES NFR BLD: 0.4 %
KETONES UR STRIP.AUTO-MCNC: NEGATIVE MG/DL
LEUKOCYTE ESTERASE UR QL STRIP.AUTO: NEGATIVE
LIPASE SERPL-CCNC: 33 U/L (ref 12–53)
LYMPHOCYTES # BLD AUTO: 2.12 X10(3) UL (ref 1–4)
LYMPHOCYTES NFR BLD AUTO: 11.7 %
MCH RBC QN AUTO: 27.5 PG (ref 26–34)
MCHC RBC AUTO-ENTMCNC: 33.8 G/DL (ref 31–37)
MCV RBC AUTO: 81.2 FL
MONOCYTES # BLD AUTO: 1.46 X10(3) UL (ref 0.1–1)
MONOCYTES NFR BLD AUTO: 8.1 %
NEUTROPHILS # BLD AUTO: 14.43 X10 (3) UL (ref 1.5–7.7)
NEUTROPHILS # BLD AUTO: 14.43 X10(3) UL (ref 1.5–7.7)
NEUTROPHILS NFR BLD AUTO: 79.6 %
NITRITE UR QL STRIP.AUTO: NEGATIVE
OSMOLALITY SERPL CALC.SUM OF ELEC: 295 MOSM/KG (ref 275–295)
PH UR STRIP.AUTO: 5.5 [PH] (ref 5–8)
PLATELET # BLD AUTO: 328 10(3)UL (ref 150–450)
POTASSIUM SERPL-SCNC: 3.4 MMOL/L (ref 3.5–5.1)
PROT SERPL-MCNC: 9.2 G/DL (ref 5.7–8.2)
PROT UR STRIP.AUTO-MCNC: 30 MG/DL
RBC # BLD AUTO: 5.79 X10(6)UL
RSV RNA SPEC NAA+PROBE: NEGATIVE
SARS-COV-2 RNA RESP QL NAA+PROBE: NOT DETECTED
SODIUM SERPL-SCNC: 140 MMOL/L (ref 136–145)
SP GR UR STRIP.AUTO: 1.01 (ref 1–1.03)
UROBILINOGEN UR STRIP.AUTO-MCNC: NORMAL MG/DL
WBC # BLD AUTO: 18.1 X10(3) UL (ref 4–11)

## 2025-01-30 PROCEDURE — 74176 CT ABD & PELVIS W/O CONTRAST: CPT | Performed by: EMERGENCY MEDICINE

## 2025-01-30 PROCEDURE — 81001 URINALYSIS AUTO W/SCOPE: CPT | Performed by: EMERGENCY MEDICINE

## 2025-01-30 PROCEDURE — 0241U SARS-COV-2/FLU A AND B/RSV BY PCR (GENEXPERT): CPT | Performed by: EMERGENCY MEDICINE

## 2025-01-30 PROCEDURE — 85025 COMPLETE CBC W/AUTO DIFF WBC: CPT | Performed by: EMERGENCY MEDICINE

## 2025-01-30 PROCEDURE — 99284 EMERGENCY DEPT VISIT MOD MDM: CPT

## 2025-01-30 PROCEDURE — 80053 COMPREHEN METABOLIC PANEL: CPT | Performed by: EMERGENCY MEDICINE

## 2025-01-30 PROCEDURE — 99285 EMERGENCY DEPT VISIT HI MDM: CPT

## 2025-01-30 PROCEDURE — 96361 HYDRATE IV INFUSION ADD-ON: CPT

## 2025-01-30 PROCEDURE — 83690 ASSAY OF LIPASE: CPT | Performed by: EMERGENCY MEDICINE

## 2025-01-30 PROCEDURE — 96374 THER/PROPH/DIAG INJ IV PUSH: CPT

## 2025-01-30 RX ORDER — ONDANSETRON 4 MG/1
4 TABLET, ORALLY DISINTEGRATING ORAL EVERY 4 HOURS PRN
Qty: 10 TABLET | Refills: 0 | Status: SHIPPED | OUTPATIENT
Start: 2025-01-30 | End: 2025-02-06

## 2025-01-30 RX ORDER — ONDANSETRON 2 MG/ML
4 INJECTION INTRAMUSCULAR; INTRAVENOUS ONCE
Status: COMPLETED | OUTPATIENT
Start: 2025-01-30 | End: 2025-01-30

## 2025-01-30 NOTE — ED PROVIDER NOTES
Patient Seen in: [facility] Emergency Department      History     Chief Complaint   Patient presents with    Nausea/Vomiting/Diarrhea     Stated Complaint:     Subjective:   74-year-old male, remote history of renal carcinoma status post partial nephrectomy 30+ years ago, cancer free since, history of diverticulitis and diverticulosis, hypertension, presents with nausea vomiting x 3 to 4 days.  No diarrhea.  No bloody stools.  No hematemesis.  No fevers.  States he is no abdominal pain whatsoever.  No flank pain.  No dysuria or decreased urine output.  States is from some bad chicken tacos a few days ago.              Objective:     Past Medical History:    Cancer (HCC)    renal    Diabetes (HCC)    Diverticulitis    Diverticulosis of large intestine    Essential hypertension              Past Surgical History:   Procedure Laterality Date    Colonoscopy N/A 6/16/2019    Procedure: COLONOSCOPY;  Surgeon: Won Rashid MD;  Location: [facility]    Kidney surgery      1/3 kidney removed    Other      partial nephrectomy of right kidney due to cancer                Social History     Socioeconomic History    Marital status:    Tobacco Use    Smoking status: Never    Smokeless tobacco: Never   Vaping Use    Vaping status: Never Used   Substance and Sexual Activity    Alcohol use: No    Drug use: Yes     Types: Cannabis     Comment: occ     Social Drivers of Health     Food Insecurity: No Food Insecurity (7/12/2024)    Food Insecurity     Food Insecurity: Never true   Transportation Needs: No Transportation Needs (7/12/2024)    Transportation Needs     Lack of Transportation: No   Housing Stability: Low Risk  (7/12/2024)    Housing Stability     Housing Instability: No                  Physical Exam     ED Triage Vitals [01/30/25 1355]   /75   Pulse 104   Resp 18   Temp 98.6 °F (37 °C)   Temp src Oral   SpO2 99 %   O2 Device None (Room air)       Current Vitals:   Vital Signs  BP: 158/74  Pulse:  78  Resp: 16  Temp: 98.2 °F (36.8 °C)  Temp src: Oral    Oxygen Therapy  SpO2: 99 %  O2 Device: None (Room air)        Physical Exam  Vitals and nursing note reviewed.   Constitutional:       General: He is not in acute distress.     Appearance: He is not toxic-appearing.   HENT:      Head: Normocephalic.   Cardiovascular:      Rate and Rhythm: Normal rate and regular rhythm.      Heart sounds: Normal heart sounds.   Pulmonary:      Effort: Pulmonary effort is normal.      Breath sounds: Normal breath sounds.   Abdominal:      Palpations: Abdomen is soft.      Tenderness: There is no abdominal tenderness. There is no guarding or rebound. Negative signs include Watson's sign and McBurney's sign.      Hernia: No hernia is present.   Skin:     General: Skin is warm and dry.   Neurological:      General: No focal deficit present.      Mental Status: He is alert.   Psychiatric:         Behavior: Behavior normal.             ED Course     Labs Reviewed   COMP METABOLIC PANEL (14) - Abnormal; Notable for the following components:       Result Value    Glucose 146 (*)     Potassium 3.4 (*)     Creatinine 1.66 (*)     eGFR-Cr 43 (*)     Total Protein 9.2 (*)     Albumin 5.4 (*)     Globulin  3.8 (*)     All other components within normal limits   CBC WITH DIFFERENTIAL WITH PLATELET - Abnormal; Notable for the following components:    WBC 18.1 (*)     Neutrophil Absolute Prelim 14.43 (*)     Neutrophil Absolute 14.43 (*)     Monocyte Absolute 1.46 (*)     All other components within normal limits   URINALYSIS WITH CULTURE REFLEX - Abnormal; Notable for the following components:    Clarity Urine Turbid (*)     Blood Urine Trace (*)     Protein Urine 30 (*)     WBC Urine 6-10 (*)     Squamous Epi. Cells Few (*)     Hyaline Casts Present (*)     All other components within normal limits   LIPASE - Normal   SARS-COV-2/FLU A AND B/RSV BY PCR (GENEXPERT) - Normal    Narrative:     This test is intended for the qualitative detection  and differentiation of SARS-CoV-2, influenza A, influenza B, and respiratory syncytial virus (RSV) viral RNA in nasopharyngeal or nares swabs from individuals suspected of respiratory viral infection consistent with COVID-19 by their healthcare provider. Signs and symptoms of respiratory viral infection due to SARS-CoV-2, influenza, and RSV can be similar.    Test performed using the Xpert Xpress SARS-CoV-2/FLU/RSV (real time RT-PCR)  assay on the GeneXpert instrument, [employer], [place] [address].   This test is being used under the Food and Drug Administration's Emergency Use Authorization.    The authorized Fact Sheet for Healthcare Providers for this assay is available upon request from the laboratory.                   [facility]      CT ABDOMEN+PELVIS(CPT=74176)    Result Date: 1/30/2025  CONCLUSION:  No acute abdominal or pelvic disease process on this noncontrast CT with limitations thereof.  The prostate gland is enlarged.  No hydronephrosis.  Assessment of the kidneys limited without contrast, but multiple cystic masses are present.  No bowel obstruction ascites or free air.   LOCATION:  [facility]   Dictated by (CST): Maximilian Reeves MD on 1/30/2025 at 4:27 PM     Finalized by (CST): Maximilian Reeves MD on 1/30/2025 at 4:31 PM        I independently interpreted the CT abdomen pelvis on the obvious signs of obstructive process, do note the incidentals as discussed with the patient at bedside    Differential diagnosis includes, but not limited to, norovirus, bowel obstruction, perforation, bacterial infection, viral syndrome    External chart review demonstrates outpatient telephone encounter with GI earlier today, canceled his GI appointment and will reschedule    74-year-old male presents with nausea and vomiting for a few days.  Some mild EDILMA.  No obvious signs of obstructive or infectious process.  May be viral.  Has no complaints of abdominal pain.  Abdomen is soft nontender nondistended.  No flank pain.  No  urinary symptoms.  Remote history of renal cancer.  Discussed his renal cyst with me in follow-up for monitoring and further work and evaluation.  Verbalized understanding of the importance of this.  Zofran ODT as needed at home.  She is IV fluids here.  He is very well-appearing and nontoxic.  Tolerating p.o., no vomiting episodes here.  Said no diarrhea.  Well-appearing, further workup including possible mission to the hospital offered discussed and declined at this time.  Shared decision making utilized to discharge home after discussion of risk, benefits and alternatives    Patient was screened and evaluated during this visit.  As the treating physician attending to the patient, I determined within reasonable clinical confidence and prior to discharge, that an emergency medical condition was not or was no longer present.  There was no indication for further evaluation, treatment, or admission on an emergency basis.  Comprehensive verbal and written discharge and follow-up instructions were provided to help prevent relapse or worsening.  Patient was instructed to follow-up with their primary care provider for further evaluation and treatment, return immediately to ER for worsening, concerning, new, or changing/persisting symptoms. I discussed the case with the patient and they had no questions, complaints, or concerns.  Patient was comfortable going home.     Per the discharge paperwork, patients are encouraged to and given instructions on how to sign up for [facility], where they have access to their records, including any/all incidental findings.     This note was prepared using Dragon Medical voice recognition dictation software. As a result errors may occur. When identified these errors have been corrected. While every attempt is made to correct errors during dictation discrepancies may still exist    Note to patient: The 21st Century Cures Act makes medical notes like these available to patients in the interest  of transparency. However, this is a medical document intended as peer to peer communication. It is written in medical language and may contain abbreviations or verbiage that are unfamiliar. It may appear blunt or direct. Medical documents are intended to carry relevant information, facts as evident, and the clinical opinion of the practitioner.           Medical Decision Making      Disposition and Plan     Clinical Impression:  1. Nausea and vomiting in adult    2. Renal cyst    3. EDILMA (acute kidney injury)         Disposition:  Discharge  1/30/2025  6:01 pm    Follow-up:  Alec Soto MD  [address] [facility] [address]  [phone]    Follow up      Taisha Holder MD  [address]  [address]  [facility] [id number] [phone]    Follow up            Medications Prescribed:  Discharge Medication List as of 1/30/2025  6:05 PM        START taking these medications    Details   ondansetron 4 MG Oral Tablet Dispersible Take 1 tablet (4 mg total) by mouth every 4 (four) hours as needed for Nausea., Normal, Disp-10 tablet, R-0                 Supplementary Documentation:

## 2025-08-30 ENCOUNTER — HOSPITAL ENCOUNTER (EMERGENCY)
Facility: HOSPITAL | Age: 75
Discharge: HOME | End: 2025-08-30
Attending: EMERGENCY MEDICINE

## 2025-08-30 VITALS
TEMPERATURE: 98 F | SYSTOLIC BLOOD PRESSURE: 142 MMHG | WEIGHT: 180 LBS | HEART RATE: 73 BPM | DIASTOLIC BLOOD PRESSURE: 73 MMHG | BODY MASS INDEX: 27.28 KG/M2 | OXYGEN SATURATION: 100 % | HEIGHT: 68 IN | RESPIRATION RATE: 13 BRPM

## 2025-08-30 DIAGNOSIS — H16.002 CORNEAL ULCER OF LEFT EYE: Primary | ICD-10-CM

## 2025-08-30 RX ORDER — POLYMYXIN B SULFATE AND TRIMETHOPRIM 1; 10000 MG/ML; [USP'U]/ML
1 SOLUTION OPHTHALMIC
Qty: 1 EACH | Refills: 0 | Status: SHIPPED | OUTPATIENT
Start: 2025-08-30

## 2025-08-30 RX ORDER — VALACYCLOVIR HYDROCHLORIDE 1 G/1
1000 TABLET, FILM COATED ORAL 3 TIMES DAILY
Qty: 21 TABLET | Refills: 0 | Status: SHIPPED | OUTPATIENT
Start: 2025-08-30 | End: 2025-09-06

## 2025-08-30 RX ORDER — OFLOXACIN 3 MG/ML
1 SOLUTION/ DROPS OPHTHALMIC 2 TIMES DAILY
COMMUNITY
Start: 2025-06-06 | End: 2025-08-30

## 2025-08-30 RX ORDER — PREDNISOLONE ACETATE 10 MG/ML
1 SUSPENSION/ DROPS OPHTHALMIC 4 TIMES DAILY
COMMUNITY
Start: 2025-08-11

## 2025-08-30 RX ORDER — FLUORESCEIN SODIUM 1 MG/MG
1 STRIP OPHTHALMIC ONCE
Status: COMPLETED | OUTPATIENT
Start: 2025-08-30 | End: 2025-08-30

## 2025-08-30 RX ORDER — DICYCLOMINE HYDROCHLORIDE 10 MG/1
10 CAPSULE ORAL
COMMUNITY
Start: 2025-07-14

## 2025-08-30 RX ORDER — TETRACAINE HYDROCHLORIDE 5 MG/ML
1 SOLUTION OPHTHALMIC ONCE
Status: COMPLETED | OUTPATIENT
Start: 2025-08-30 | End: 2025-08-30

## (undated) DEVICE — 3M™ RED DOT™ MONITORING ELECTRODE WITH FOAM TAPE AND STICKY GEL, 50/BAG, 20/CASE, 72/PLT 2570: Brand: RED DOT™

## (undated) DEVICE — ENDOSCOPY PACK UPPER: Brand: MEDLINE INDUSTRIES, INC.

## (undated) DEVICE — MEDI-VAC SUCTION HANDLE REGULAR CAPACITY: Brand: CARDINAL HEALTH

## (undated) DEVICE — 1200CC GUARDIAN II: Brand: GUARDIAN

## (undated) DEVICE — KIT CUSTOM ENDOPROCEDURE STERIS

## (undated) DEVICE — Device: Brand: DEFENDO AIR/WATER/SUCTION AND BIOPSY VALVE

## (undated) DEVICE — 10FT COMBINED O2 DELIVERY/CO2 MONITORING. FILTER WITH MICROSTREAM TYPE LUER: Brand: DUAL ADULT NASAL CANNULA

## (undated) DEVICE — ENDOSCOPY PACK - LOWER: Brand: MEDLINE INDUSTRIES, INC.

## (undated) DEVICE — BITEBLOCK ENDOSCP 60FR MAXI STRP

## (undated) DEVICE — FILTERLINE NASAL ADULT O2/CO2

## (undated) DEVICE — KIT VLV 5 PC AIR H2O SUCT BX ENDOGATOR CONN

## (undated) DEVICE — FORCEP BIOPSY RJ4 LG CAP W/ND

## (undated) NOTE — LETTER
Bharti Goldberg [address]  [place], [address] [facility]    Consent for Operation  Date: __________________                                Time: _______________    1.  I authorize the performance upon Kandis Cardenas the following operation:  Procedure( procedure has been videotaped, the surgeon will obtain the original videotape. The hospital will not be responsible for storage or maintenance of this tape.   8. For the purpose of advancing medical education, I consent to the admittance of observers to the STATEMENTS REQUIRING INSERTION OR COMPLETION WERE FILLED IN.     Signature of Patient:   ___________________________    When the patient is a minor or mentally incompetent to give consent:  Signature of person authorized to consent for patient: ____________ supplements, and pills I can buy without a prescription (including street drugs/illegal medications). Failure to inform my anesthesiologist about these medicines may increase my risk of anesthetic complications. iv.  If I am allergic to anything or have ha Anesthesiologist Signature     Date   Time  I have discussed the procedure and information above with the patient (or patient’s representative) and answered their questions. The patient or their representative has agreed to have anesthesia services.     ___

## (undated) NOTE — LETTER
July 15, 2024  To Whom It May Concern,  Please let this letter certify that FLAQUITA FELIX   was seen on {DATE 2:4442} at *** [place]::\"pm\"}  at [facility]:8450}. They may return to:        []  Work     []   School    []   P.E. Class   []  Sports     []  Without restrictions     []   With the following restrictions:      Additional Comments:         Please feel free to contact me at [phone] with any questions.   Sincerely,    No name on file

## (undated) NOTE — LETTER
Date & Time: 8/3/2020, 8:23 PM  Patient: Vicky Contreras  Encounter Provider(s):    Meryl Butler MD       To Whom It May Concern:    Vicky Contreras was seen and treated in our department on 8/3/2020. He ,ay return to work 8/06/20.     If you have any

## (undated) NOTE — ED AVS SNAPSHOT
Baldemar Lefort   MRN: [id number]    Department:  [facility] Emergency Department   Date of Visit:  6/2/2018           Disclosure     Insurance plans vary and the physician(s) referred by the ER may not be covered by your plan.  Please contact your tell this physician (or your personal doctor if your instructions are to return to your personal doctor) about any new or lasting problems. The primary care or specialist physician will see patients referred from the [facility] Emergency Department.  Bonifacio Ly

## (undated) NOTE — LETTER
[address]  [address]  Authorization for Surgical Operation and Procedure     Date:___________                                                                                                         Time:__________  I hereby authorize Surgeon(s):  Christopher Orta MD, my physician and his/her assistants (if applicable), which may include medical students, residents, and/or fellows, to perform the following surgical operation/ procedure and administer such anesthesia as may be determined necessary by my physician:  Operation/Procedure name (s) Procedure(s):  ESOPHAGOGASTRODUODENOSCOPY (EGD) on Kandis Xaveir   2.   I recognize that during the surgical operation/procedure, unforeseen conditions may necessitate additional or different procedures than those listed above.  I, therefore, further authorize and request that the above-named surgeon, assistants, or designees perform such procedures as are, in their judgment, necessary and desirable.    3.   My surgeon/physician has discussed prior to my surgery the potential benefits, risks and side effects of this procedure; the likelihood of achieving goals; and potential problems that might occur during recuperation.  They also discussed reasonable alternatives to the procedure, including risks, benefits, and side effects related to the alternatives and risks related to not receiving this procedure.  I have had all my questions answered and I acknowledge that no guarantee has been made as to the result that may be obtained.    4.   Should the need arise during my operation/procedure, which includes change of level of care prior to discharge, I also consent to the administration of blood and/or blood products.  Further, I understand that despite careful testing and screening of blood or blood products by collecting agencies, I may still be subject to ill effects as a result of receiving a blood transfusion and/or blood products.  The  following are some, but not all, of the potential risks that can occur: fever and allergic reactions, hemolytic reactions, transmission of diseases such as Hepatitis, AIDS and Cytomegalovirus (CMV) and fluid overload.  In the event that I wish to have an autologous transfusion of my own blood, or a directed donor transfusion, I will discuss this with my physician.  Check only if Refusing Blood or Blood Products  I understand refusal of blood or blood products as deemed necessary by my physician may have serious consequences to my condition to include possible death. I hereby assume responsibility for my refusal and release the hospital, its personnel, and my physicians from any responsibility for the consequences of my refusal.          o  Refuse      5.   I authorize the use of any specimen, organs, tissues, body parts or foreign objects that may be removed from my body during the operation/procedure for diagnosis, research or teaching purposes and their subsequent disposal by hospital authorities.  I also authorize the release of specimen test results and/or written reports to my treating physician on the hospital medical staff or other referring or consulting physicians involved in my care, at the discretion of the Pathologist or my treating physician.    6.   I consent to the photographing or videotaping of the operations or procedures to be performed, including appropriate portions of my body for medical, scientific, or educational purposes, provided my identity is not revealed by the pictures or by descriptive texts accompanying them.  If the procedure has been photographed/videotaped, the surgeon will obtain the original picture, image, videotape or CD.  The hospital will not be responsible for storage, release or maintenance of the picture, image, tape or CD.    7.   I consent to the presence of a  or observers in the operating room as deemed necessary by my physician or their designees.     8.   I recognize that in the event my procedure results in extended X-Ray/fluoroscopy time, I may develop a skin reaction.    9. If I have a Do Not Attempt Resuscitation (DNAR) order in place, that status will be suspended while in the operating room, procedural suite, and during the recovery period unless otherwise explicitly stated by me (or a person authorized to consent on my behalf). The surgeon or my attending physician will determine when the applicable recovery period ends for purposes of reinstating the DNAR order.  10. Patients having a sterilization procedure: I understand that if the procedure is successful the results will be permanent and it will therefore be impossible for me to inseminate, conceive, or bear children.  I also understand that the procedure is intended to result in sterility, although the result has not been guaranteed.   11. I acknowledge that my physician has explained sedation/analgesia administration to me including the risk and benefits I consent to the administration of sedation/analgesia as may be necessary or desirable in the judgment of my physician.    I CERTIFY THAT I HAVE READ AND FULLY UNDERSTAND THE ABOVE CONSENT TO OPERATION and/or OTHER PROCEDURE.    _________________________________________  __________________________________  Signature of Patient     Signature of Responsible Person         ___________________________________         Printed Name of Responsible Person           _________________________________                 Relationship to Patient  _________________________________________  ______________________________  Signature of Witness          Date  Time      Patient Name: Kandis Xavier     : 10/6/1950                 Printed: 2024     Medical Record #: [id number]                     Page [address] of [address] [place], [place] [address], [place]  [address]    Consent for Anesthesia    I, Kandis Xavier agree to  be cared for by an anesthesiologist, who is specially trained to monitor me and give me medicine to put me to sleep or keep me comfortable during my procedure    I understand that my anesthesiologist is not an employee or agent of [facility] or [employer]. He or she works for [employer].    As the patient asking for anesthesia services, I agree to:  Allow the anesthesiologist (anesthesia doctor) to give me medicine and do additional procedures as necessary. Some examples are: Starting or using an “IV” to give me medicine, fluids or blood during my procedure, and having a breathing tube placed to help me breathe when I’m asleep (intubation). In the event that my heart stops working properly, I understand that my anesthesiologist will make every effort to sustain my life, unless otherwise directed by [facility] Do Not Resuscitate documents.  Tell my anesthesia doctor before my procedure:  If I am pregnant.  The last time that I ate or drank.  All of the medicines I take (including prescriptions, herbal supplements, and pills I can buy without a prescription (including street drugs/illegal medications). Failure to inform my anesthesiologist about these medicines may increase my risk of anesthetic complications.  If I am allergic to anything or have had a reaction to anesthesia before.  I understand how the anesthesia medicine will help me (benefits).  I understand that with any type of anesthesia medicine there are risks:  The most common risks are: nausea, vomiting, sore throat, muscle soreness, damage to my eyes, mouth, or teeth (from breathing tube placement).  Rare risks include: remembering what happened during my procedure, allergic reactions to medications, injury to my airway, heart, lungs, vision, nerves, or muscles and in extremely rare instances death.  My doctor has explained to me other choices available to me for my care (alternatives).  Pregnant Patients  (“epidural”):  I understand that the risks of having an epidural (medicine given into my back to help control pain during labor), include itching, low blood pressure, difficulty urinating, headache or slowing of the baby’s heart. Very rare risks include infection, bleeding, seizure, irregular heart rhythms and nerve injury.  Regional Anesthesia (“spinal”, “epidural”, & “nerve blocks”):  I understand that rare but potential complications include headache, bleeding, infection, seizure, irregular heart rhythms, and nerve injury.    I can change my mind about having anesthesia services at any time before I get the medicine.    _____________________________________________________________________________  Patient (or Representative) Signature/Relationship to Patient  Date   Time    _____________________________________________________________________________   Name (if used)    Language/Organization   Time    _____________________________________________________________________________  Anesthesiologist Signature     Date   Time  I have discussed the procedure and information above with the patient (or patient’s representative) and answered their questions. The patient or their representative has agreed to have anesthesia services.    _____________________________________________________________________________  Witness        Date   Time  I have verified that the signature is that of the patient or patient’s representative, and that it was signed before the procedure  Patient Name: Kandis Xavier     : 10/6/1950                 Printed: 2024     Medical Record #: [id number]                     Page 2 of 2

## (undated) NOTE — LETTER
[address]  [address]  Authorization for Surgical Operation and Procedure     Date:___________                                                                                                         Time:__________  I hereby authorize Surgeon(s):  Christopher Orta MD, my physician and his/her assistants (if applicable), which may include medical students, residents, and/or fellows, to perform the following surgical operation/ procedure and administer such anesthesia as may be determined necessary by my physician:  Operation/Procedure name (s) Procedure(s):  ESOPHAGOGASTRODUODENOSCOPY (EGD) on Kandis Xavier   2.   I recognize that during the surgical operation/procedure, unforeseen conditions may necessitate additional or different procedures than those listed above.  I, therefore, further authorize and request that the above-named surgeon, assistants, or designees perform such procedures as are, in their judgment, necessary and desirable.    3.   My surgeon/physician has discussed prior to my surgery the potential benefits, risks and side effects of this procedure; the likelihood of achieving goals; and potential problems that might occur during recuperation.  They also discussed reasonable alternatives to the procedure, including risks, benefits, and side effects related to the alternatives and risks related to not receiving this procedure.  I have had all my questions answered and I acknowledge that no guarantee has been made as to the result that may be obtained.    4.   Should the need arise during my operation/procedure, which includes change of level of care prior to discharge, I also consent to the administration of blood and/or blood products.  Further, I understand that despite careful testing and screening of blood or blood products by collecting agencies, I may still be subject to ill effects as a result of receiving a blood transfusion and/or blood products.  The  following are some, but not all, of the potential risks that can occur: fever and allergic reactions, hemolytic reactions, transmission of diseases such as Hepatitis, AIDS and Cytomegalovirus (CMV) and fluid overload.  In the event that I wish to have an autologous transfusion of my own blood, or a directed donor transfusion, I will discuss this with my physician.  Check only if Refusing Blood or Blood Products  I understand refusal of blood or blood products as deemed necessary by my physician may have serious consequences to my condition to include possible death. I hereby assume responsibility for my refusal and release the hospital, its personnel, and my physicians from any responsibility for the consequences of my refusal.          o  Refuse      5.   I authorize the use of any specimen, organs, tissues, body parts or foreign objects that may be removed from my body during the operation/procedure for diagnosis, research or teaching purposes and their subsequent disposal by hospital authorities.  I also authorize the release of specimen test results and/or written reports to my treating physician on the hospital medical staff or other referring or consulting physicians involved in my care, at the discretion of the Pathologist or my treating physician.    6.   I consent to the photographing or videotaping of the operations or procedures to be performed, including appropriate portions of my body for medical, scientific, or educational purposes, provided my identity is not revealed by the pictures or by descriptive texts accompanying them.  If the procedure has been photographed/videotaped, the surgeon will obtain the original picture, image, videotape or CD.  The hospital will not be responsible for storage, release or maintenance of the picture, image, tape or CD.    7.   I consent to the presence of a  or observers in the operating room as deemed necessary by my physician or their designees.     8.   I recognize that in the event my procedure results in extended X-Ray/fluoroscopy time, I may develop a skin reaction.    9. If I have a Do Not Attempt Resuscitation (DNAR) order in place, that status will be suspended while in the operating room, procedural suite, and during the recovery period unless otherwise explicitly stated by me (or a person authorized to consent on my behalf). The surgeon or my attending physician will determine when the applicable recovery period ends for purposes of reinstating the DNAR order.  10. Patients having a sterilization procedure: I understand that if the procedure is successful the results will be permanent and it will therefore be impossible for me to inseminate, conceive, or bear children.  I also understand that the procedure is intended to result in sterility, although the result has not been guaranteed.   11. I acknowledge that my physician has explained sedation/analgesia administration to me including the risk and benefits I consent to the administration of sedation/analgesia as may be necessary or desirable in the judgment of my physician.    I CERTIFY THAT I HAVE READ AND FULLY UNDERSTAND THE ABOVE CONSENT TO OPERATION and/or OTHER PROCEDURE.    _________________________________________  __________________________________  Signature of Patient     Signature of Responsible Person         ___________________________________         Printed Name of Responsible Person           _________________________________                 Relationship to Patient  _________________________________________  ______________________________  Signature of Witness          Date  Time      Patient Name: Kandis Xavier     : 10/6/1950                 Printed: 2024     Medical Record #: [id number]                     Page [address] of [address] [place], [place] [address], [place]  [address]    Consent for Anesthesia    I, Kandis Xavier agree to  be cared for by an anesthesiologist, who is specially trained to monitor me and give me medicine to put me to sleep or keep me comfortable during my procedure    I understand that my anesthesiologist is not an employee or agent of [facility] or [employer]. He or she works for [employer].    As the patient asking for anesthesia services, I agree to:  Allow the anesthesiologist (anesthesia doctor) to give me medicine and do additional procedures as necessary. Some examples are: Starting or using an “IV” to give me medicine, fluids or blood during my procedure, and having a breathing tube placed to help me breathe when I’m asleep (intubation). In the event that my heart stops working properly, I understand that my anesthesiologist will make every effort to sustain my life, unless otherwise directed by [facility] Do Not Resuscitate documents.  Tell my anesthesia doctor before my procedure:  If I am pregnant.  The last time that I ate or drank.  All of the medicines I take (including prescriptions, herbal supplements, and pills I can buy without a prescription (including street drugs/illegal medications). Failure to inform my anesthesiologist about these medicines may increase my risk of anesthetic complications.  If I am allergic to anything or have had a reaction to anesthesia before.  I understand how the anesthesia medicine will help me (benefits).  I understand that with any type of anesthesia medicine there are risks:  The most common risks are: nausea, vomiting, sore throat, muscle soreness, damage to my eyes, mouth, or teeth (from breathing tube placement).  Rare risks include: remembering what happened during my procedure, allergic reactions to medications, injury to my airway, heart, lungs, vision, nerves, or muscles and in extremely rare instances death.  My doctor has explained to me other choices available to me for my care (alternatives).  Pregnant Patients  (“epidural”):  I understand that the risks of having an epidural (medicine given into my back to help control pain during labor), include itching, low blood pressure, difficulty urinating, headache or slowing of the baby’s heart. Very rare risks include infection, bleeding, seizure, irregular heart rhythms and nerve injury.  Regional Anesthesia (“spinal”, “epidural”, & “nerve blocks”):  I understand that rare but potential complications include headache, bleeding, infection, seizure, irregular heart rhythms, and nerve injury.    I can change my mind about having anesthesia services at any time before I get the medicine.    _____________________________________________________________________________  Patient (or Representative) Signature/Relationship to Patient  Date   Time    _____________________________________________________________________________   Name (if used)    Language/Organization   Time    _____________________________________________________________________________  Anesthesiologist Signature     Date   Time  I have discussed the procedure and information above with the patient (or patient’s representative) and answered their questions. The patient or their representative has agreed to have anesthesia services.    _____________________________________________________________________________  Witness        Date   Time  I have verified that the signature is that of the patient or patient’s representative, and that it was signed before the procedure  Patient Name: Kandis Xavier     : 10/6/1950                 Printed: 2024     Medical Record #: [id number]                     Page 2 of 2

## (undated) NOTE — LETTER
Date & Time: 3/22/2021, 4:51 PM  Patient: Jamar Monae  Encounter Provider(s):    Santa Osborn MD       To Whom It May Concern:    Jamar Monae was seen and treated in our department on 3/22/2021. He should not return to work until 3/25.     If y

## (undated) NOTE — LETTER
6/19/2019          [address]  [id number]    Dear Reta Solo,     Here are the  biopsy/pathology findings from your recent Colonoscopy : The colon polyp was a benign adenomatous polyp.    The random colon bi

## (undated) NOTE — ED AVS SNAPSHOT
Aristides Carter   MRN: [id number]    Department:  [facility] Emergency Department   Date of Visit:  12/2/2019           Disclosure     Insurance plans vary and the physician(s) referred by the ER may not be covered by your plan.  Please contact your tell this physician (or your personal doctor if your instructions are to return to your personal doctor) about any new or lasting problems. The primary care or specialist physician will see patients referred from the [facility] Emergency Department.  Valeriy Lora

## (undated) NOTE — LETTER
Bharti Goldberg [address] [place] [address]  [place], [address] [facility]    Consent for Operation  Date: ______06/15/219____________                                Time: _______1440_______    1.  I authorize the performance upon Kandis Cardenas the following operation: procedure has been videotaped, the surgeon will obtain the original videotape. The hospital will not be responsible for storage or maintenance of this tape.   7. For the purpose of advancing medical education, I consent to the admittance of observers to the STATEMENTS REQUIRING INSERTION OR COMPLETION WERE FILLED IN.     Signature of Patient:   ___________________________    When the patient is a minor or mentally incompetent to give consent:  Signature of person authorized to consent for patient: ____________ supplements, and pills I can buy without a prescription (including street drugs/illegal medications). Failure to inform my anesthesiologist about these medicines may increase my risk of anesthetic complications. iv.  If I am allergic to anything or have ha Anesthesiologist Signature     Date   Time  I have discussed the procedure and information above with the patient (or patient’s representative) and answered their questions. The patient or their representative has agreed to have anesthesia services.     ___

## (undated) NOTE — LETTER
Date: 3/7/2018  Patient Name:  Aidan Wong             Address: [address] [place] [address] [address] [address] [address] [facility] [address]    : 10/6/1950    Dear Aidan Wong,      I hope this letter finds you well.   The results of the biopsies taken from your stomach ofe

## (undated) NOTE — LETTER
Date & Time: 2/3/2023, 4:40 PM  Patient: Rosie Judge  Encounter Provider(s):    Kalina Eid MD       To Whom It May Concern:    Rosie Judge was seen and treated in our department on 2/3/2023. He should not return to work until 2/6/23.     If you have any questions or concerns, please do not hesitate to call.        _____________________________  Physician/APC Signature

## (undated) NOTE — LETTER
January 24, 2018    Patient: Kanu Arzate   Date of Visit: 1/24/2018       To Whom It May Concern:    Kanu Arzate was seen and treated in our emergency department on 1/24/2018. He should not return to work until 1/27.     If you have any questions

## (undated) NOTE — LETTER
Date & Time: 3/22/2021, 5:19 PM  Patient: Valeria Lindo  Encounter Provider(s):    Howie Patel MD       To Whom It May Concern:    Valeria Lindo was seen and treated in our department on 3/22/2021.  He may return to work on 3/26/21  If you have an

## (undated) NOTE — LETTER
07/15/24    Kandis Xavier      To Whom It May Concern:    This letter has been written at the patient's request. The above patient was seen at [facility] for treatment of a medical condition from 7/12/23-7/15/23    The patient may return to work/school on 7/16/ 23      Sincerely,        Laura FLETCHER RN  07/15/24, 10:36 AM

## (undated) NOTE — LETTER
[facility]  Keegan Saravia [address]    Consent for Operation    Date: __________________    Time: _______________    1. I authorize the performance upon Kandis Cardenas the following operation:    Esophagogastroduodenoscopy    2.  I a videotape. The [facility] will not be responsible for storage or maintenance of this tape. 6. For the purpose of advancing medical education, I consent to the admittance of observers to the Operating Room.     7. I authorize the use of any specimen, organs Signature of Patient:   ___________________________    When the patient is a minor or mentally incompetent to give consent:  Signature of person authorized to consent for patient: ___________________________   Relationship to patient: _____________________ drugs/illegal medications). Failure to inform my anesthesiologist about these medicines may increase my risk of anesthetic complications. · If I am allergic to anything or have had a reaction to anesthesia before.     3. I understand how the anesthesia med I have discussed the procedure and information above with the patient (or patient’s representative) and answered their questions. The patient or their representative has agreed to have anesthesia services.     _______________________________________________

## (undated) NOTE — LETTER
11/27/18    Kandis Schulz      To Whom It May Concern:     This letter has been written at the patient's request. The above patient was seen at [facility] for treatment of a medical condition from ***    The patient may return to work/school on *** wi

## (undated) NOTE — LETTER
Bharti Goldberg [address] 6 [address]  [place], [address] [place] [address]    Consent for Operation  Date: __________________                                Time: _______________    1.  I authorize the performance upon Kandis Cardenas the following operation:  nedai procedure has been videotaped, the surgeon will obtain the original videotape. The hospital will not be responsible for storage or maintenance of this tape.   7. For the purpose of advancing medical education, I consent to the admittance of observers to the STATEMENTS REQUIRING INSERTION OR COMPLETION WERE FILLED IN.     Signature of Patient:   ___________________________    When the patient is a minor or mentally incompetent to give consent:  Signature of person authorized to consent for patient: ____________ supplements, and pills I can buy without a prescription (including street drugs/illegal medications). Failure to inform my anesthesiologist about these medicines may increase my risk of anesthetic complications. iv.  If I am allergic to anything or have ha Anesthesiologist Signature     Date   Time  I have discussed the procedure and information above with the patient (or patient’s representative) and answered their questions. The patient or their representative has agreed to have anesthesia services.     ___